# Patient Record
Sex: MALE | Race: WHITE | Employment: OTHER | URBAN - METROPOLITAN AREA
[De-identification: names, ages, dates, MRNs, and addresses within clinical notes are randomized per-mention and may not be internally consistent; named-entity substitution may affect disease eponyms.]

---

## 2017-01-11 RX ORDER — DIAZEPAM 10 MG/1
10 TABLET ORAL EVERY 6 HOURS PRN
Qty: 120 TABLET | Refills: 0 | Status: SHIPPED | OUTPATIENT
Start: 2017-01-11 | End: 2017-02-07 | Stop reason: SDUPTHER

## 2017-01-18 ENCOUNTER — TELEPHONE (OUTPATIENT)
Dept: ENDOCRINOLOGY | Age: 37
End: 2017-01-18

## 2017-01-18 ENCOUNTER — OFFICE VISIT (OUTPATIENT)
Dept: ENDOCRINOLOGY | Age: 37
End: 2017-01-18

## 2017-01-18 VITALS
DIASTOLIC BLOOD PRESSURE: 78 MMHG | SYSTOLIC BLOOD PRESSURE: 138 MMHG | OXYGEN SATURATION: 96 % | BODY MASS INDEX: 32.36 KG/M2 | HEART RATE: 116 BPM | WEIGHT: 232 LBS

## 2017-01-18 DIAGNOSIS — G89.29 CHRONIC MIDLINE LOW BACK PAIN WITHOUT SCIATICA: Primary | ICD-10-CM

## 2017-01-18 DIAGNOSIS — F41.9 ANXIETY: ICD-10-CM

## 2017-01-18 DIAGNOSIS — M54.50 CHRONIC MIDLINE LOW BACK PAIN WITHOUT SCIATICA: Primary | ICD-10-CM

## 2017-01-18 DIAGNOSIS — F32.89 OTHER DEPRESSION: ICD-10-CM

## 2017-01-18 PROCEDURE — G8419 CALC BMI OUT NRM PARAM NOF/U: HCPCS | Performed by: NURSE PRACTITIONER

## 2017-01-18 PROCEDURE — G8484 FLU IMMUNIZE NO ADMIN: HCPCS | Performed by: NURSE PRACTITIONER

## 2017-01-18 PROCEDURE — G8427 DOCREV CUR MEDS BY ELIG CLIN: HCPCS | Performed by: NURSE PRACTITIONER

## 2017-01-18 PROCEDURE — 4004F PT TOBACCO SCREEN RCVD TLK: CPT | Performed by: NURSE PRACTITIONER

## 2017-01-18 PROCEDURE — 99213 OFFICE O/P EST LOW 20 MIN: CPT | Performed by: NURSE PRACTITIONER

## 2017-01-18 RX ORDER — HYDROCODONE BITARTRATE AND ACETAMINOPHEN 10; 325 MG/1; MG/1
2 TABLET ORAL EVERY 6 HOURS PRN
Qty: 240 TABLET | Refills: 0 | Status: SHIPPED | OUTPATIENT
Start: 2017-01-18 | End: 2017-02-15 | Stop reason: SDUPTHER

## 2017-01-30 RX ORDER — ATORVASTATIN CALCIUM 10 MG/1
TABLET, FILM COATED ORAL
Qty: 90 TABLET | Refills: 1 | Status: SHIPPED | OUTPATIENT
Start: 2017-01-30 | End: 2017-02-28

## 2017-02-07 RX ORDER — DIAZEPAM 10 MG/1
10 TABLET ORAL EVERY 6 HOURS PRN
Qty: 120 TABLET | Refills: 0 | Status: SHIPPED | OUTPATIENT
Start: 2017-02-07 | End: 2017-03-10 | Stop reason: SDUPTHER

## 2017-02-15 ENCOUNTER — OFFICE VISIT (OUTPATIENT)
Dept: ENDOCRINOLOGY | Age: 37
End: 2017-02-15

## 2017-02-15 VITALS
BODY MASS INDEX: 34.3 KG/M2 | SYSTOLIC BLOOD PRESSURE: 136 MMHG | HEART RATE: 130 BPM | DIASTOLIC BLOOD PRESSURE: 90 MMHG | HEIGHT: 71 IN | WEIGHT: 245 LBS | OXYGEN SATURATION: 97 %

## 2017-02-15 DIAGNOSIS — G89.29 CHRONIC MIDLINE LOW BACK PAIN WITHOUT SCIATICA: Primary | ICD-10-CM

## 2017-02-15 DIAGNOSIS — F32.89 OTHER DEPRESSION: ICD-10-CM

## 2017-02-15 DIAGNOSIS — M54.50 CHRONIC MIDLINE LOW BACK PAIN WITHOUT SCIATICA: Primary | ICD-10-CM

## 2017-02-15 DIAGNOSIS — F41.9 ANXIETY: ICD-10-CM

## 2017-02-15 PROCEDURE — G8484 FLU IMMUNIZE NO ADMIN: HCPCS | Performed by: NURSE PRACTITIONER

## 2017-02-15 PROCEDURE — G8427 DOCREV CUR MEDS BY ELIG CLIN: HCPCS | Performed by: NURSE PRACTITIONER

## 2017-02-15 PROCEDURE — 4004F PT TOBACCO SCREEN RCVD TLK: CPT | Performed by: NURSE PRACTITIONER

## 2017-02-15 PROCEDURE — 99213 OFFICE O/P EST LOW 20 MIN: CPT | Performed by: NURSE PRACTITIONER

## 2017-02-15 PROCEDURE — G8417 CALC BMI ABV UP PARAM F/U: HCPCS | Performed by: NURSE PRACTITIONER

## 2017-02-15 RX ORDER — DULOXETIN HYDROCHLORIDE 60 MG/1
60 CAPSULE, DELAYED RELEASE ORAL 2 TIMES DAILY
Qty: 180 CAPSULE | Refills: 3 | Status: SHIPPED | OUTPATIENT
Start: 2017-02-15 | End: 2017-11-29 | Stop reason: SDUPTHER

## 2017-02-15 RX ORDER — ICOSAPENT ETHYL 1000 MG/1
2 CAPSULE ORAL 2 TIMES DAILY
Qty: 360 CAPSULE | Refills: 3 | Status: SHIPPED | OUTPATIENT
Start: 2017-02-15 | End: 2017-02-28

## 2017-02-15 RX ORDER — HYDROCODONE BITARTRATE AND ACETAMINOPHEN 10; 325 MG/1; MG/1
2 TABLET ORAL EVERY 6 HOURS PRN
Qty: 240 TABLET | Refills: 0 | Status: SHIPPED | OUTPATIENT
Start: 2017-02-15 | End: 2017-03-14 | Stop reason: SDUPTHER

## 2017-02-15 RX ORDER — ERGOCALCIFEROL (VITAMIN D2) 1250 MCG
100000 CAPSULE ORAL WEEKLY
Qty: 24 CAPSULE | Refills: 3 | Status: SHIPPED | OUTPATIENT
Start: 2017-02-15 | End: 2020-02-20 | Stop reason: ALTCHOICE

## 2017-02-28 ENCOUNTER — TELEPHONE (OUTPATIENT)
Dept: ENDOCRINOLOGY | Age: 37
End: 2017-02-28

## 2017-02-28 RX ORDER — FENOFIBRATE 145 MG/1
145 TABLET, COATED ORAL DAILY
Qty: 30 TABLET | Refills: 5 | Status: SHIPPED | OUTPATIENT
Start: 2017-02-28 | End: 2017-03-01

## 2017-02-28 RX ORDER — ROSUVASTATIN CALCIUM 5 MG/1
5 TABLET, COATED ORAL EVERY EVENING
Qty: 90 TABLET | Refills: 1 | Status: SHIPPED | OUTPATIENT
Start: 2017-02-28 | End: 2017-03-01 | Stop reason: ALTCHOICE

## 2017-03-01 RX ORDER — OMEGA-3-ACID ETHYL ESTERS 1 G/1
2 CAPSULE, LIQUID FILLED ORAL 2 TIMES DAILY
Qty: 360 CAPSULE | Refills: 1 | Status: SHIPPED | OUTPATIENT
Start: 2017-03-01 | End: 2019-01-23

## 2017-03-01 RX ORDER — OMEGA-3-ACID ETHYL ESTERS 1 G/1
2 CAPSULE, LIQUID FILLED ORAL 2 TIMES DAILY
Qty: 120 CAPSULE | Refills: 5 | Status: SHIPPED | OUTPATIENT
Start: 2017-03-01 | End: 2017-03-01 | Stop reason: SDUPTHER

## 2017-03-01 RX ORDER — ATORVASTATIN CALCIUM 10 MG/1
10 TABLET, FILM COATED ORAL NIGHTLY
Qty: 30 TABLET | Refills: 5 | Status: SHIPPED | OUTPATIENT
Start: 2017-03-01 | End: 2017-04-11 | Stop reason: SDUPTHER

## 2017-03-10 RX ORDER — DIAZEPAM 10 MG/1
10 TABLET ORAL EVERY 6 HOURS PRN
Qty: 120 TABLET | Refills: 0 | Status: SHIPPED | OUTPATIENT
Start: 2017-03-10 | End: 2017-04-11 | Stop reason: SDUPTHER

## 2017-03-14 ENCOUNTER — OFFICE VISIT (OUTPATIENT)
Dept: ENDOCRINOLOGY | Age: 37
End: 2017-03-14

## 2017-03-14 VITALS
SYSTOLIC BLOOD PRESSURE: 126 MMHG | BODY MASS INDEX: 35.93 KG/M2 | OXYGEN SATURATION: 98 % | WEIGHT: 257.6 LBS | HEART RATE: 124 BPM | DIASTOLIC BLOOD PRESSURE: 80 MMHG

## 2017-03-14 DIAGNOSIS — F32.89 OTHER DEPRESSION: ICD-10-CM

## 2017-03-14 DIAGNOSIS — M54.50 CHRONIC MIDLINE LOW BACK PAIN WITHOUT SCIATICA: Primary | ICD-10-CM

## 2017-03-14 DIAGNOSIS — F41.9 ANXIETY: ICD-10-CM

## 2017-03-14 DIAGNOSIS — G89.29 CHRONIC MIDLINE LOW BACK PAIN WITHOUT SCIATICA: Primary | ICD-10-CM

## 2017-03-14 PROCEDURE — G8427 DOCREV CUR MEDS BY ELIG CLIN: HCPCS | Performed by: NURSE PRACTITIONER

## 2017-03-14 PROCEDURE — 4004F PT TOBACCO SCREEN RCVD TLK: CPT | Performed by: NURSE PRACTITIONER

## 2017-03-14 PROCEDURE — 99213 OFFICE O/P EST LOW 20 MIN: CPT | Performed by: NURSE PRACTITIONER

## 2017-03-14 PROCEDURE — G8484 FLU IMMUNIZE NO ADMIN: HCPCS | Performed by: NURSE PRACTITIONER

## 2017-03-14 PROCEDURE — G8417 CALC BMI ABV UP PARAM F/U: HCPCS | Performed by: NURSE PRACTITIONER

## 2017-03-14 RX ORDER — HYDROCODONE BITARTRATE AND ACETAMINOPHEN 10; 325 MG/1; MG/1
2 TABLET ORAL EVERY 6 HOURS PRN
Qty: 240 TABLET | Refills: 0 | Status: SHIPPED | OUTPATIENT
Start: 2017-03-14 | End: 2017-04-11 | Stop reason: SDUPTHER

## 2017-04-11 ENCOUNTER — OFFICE VISIT (OUTPATIENT)
Dept: ENDOCRINOLOGY | Age: 37
End: 2017-04-11

## 2017-04-11 VITALS
HEART RATE: 127 BPM | OXYGEN SATURATION: 97 % | DIASTOLIC BLOOD PRESSURE: 80 MMHG | WEIGHT: 253 LBS | BODY MASS INDEX: 35.42 KG/M2 | HEIGHT: 71 IN | SYSTOLIC BLOOD PRESSURE: 120 MMHG

## 2017-04-11 DIAGNOSIS — M54.50 CHRONIC MIDLINE LOW BACK PAIN WITHOUT SCIATICA: Primary | ICD-10-CM

## 2017-04-11 DIAGNOSIS — F41.9 ANXIETY: ICD-10-CM

## 2017-04-11 DIAGNOSIS — F32.89 OTHER DEPRESSION: ICD-10-CM

## 2017-04-11 DIAGNOSIS — G89.29 CHRONIC MIDLINE LOW BACK PAIN WITHOUT SCIATICA: Primary | ICD-10-CM

## 2017-04-11 PROCEDURE — 4004F PT TOBACCO SCREEN RCVD TLK: CPT | Performed by: NURSE PRACTITIONER

## 2017-04-11 PROCEDURE — 99213 OFFICE O/P EST LOW 20 MIN: CPT | Performed by: NURSE PRACTITIONER

## 2017-04-11 PROCEDURE — G8417 CALC BMI ABV UP PARAM F/U: HCPCS | Performed by: NURSE PRACTITIONER

## 2017-04-11 PROCEDURE — G8427 DOCREV CUR MEDS BY ELIG CLIN: HCPCS | Performed by: NURSE PRACTITIONER

## 2017-04-11 RX ORDER — HYDROCODONE BITARTRATE AND ACETAMINOPHEN 10; 325 MG/1; MG/1
2 TABLET ORAL EVERY 6 HOURS PRN
Qty: 240 TABLET | Refills: 0 | Status: SHIPPED | OUTPATIENT
Start: 2017-04-11 | End: 2017-05-09 | Stop reason: SDUPTHER

## 2017-04-11 RX ORDER — DIAZEPAM 10 MG/1
10 TABLET ORAL EVERY 6 HOURS PRN
Qty: 120 TABLET | Refills: 0 | Status: SHIPPED | OUTPATIENT
Start: 2017-04-11 | End: 2017-05-09 | Stop reason: SDUPTHER

## 2017-04-11 RX ORDER — ATORVASTATIN CALCIUM 10 MG/1
10 TABLET, FILM COATED ORAL NIGHTLY
Qty: 30 TABLET | Refills: 5 | Status: SHIPPED | OUTPATIENT
Start: 2017-04-11 | End: 2019-04-15 | Stop reason: SDUPTHER

## 2017-04-13 RX ORDER — CYCLOBENZAPRINE HCL 10 MG
10 TABLET ORAL 3 TIMES DAILY PRN
Qty: 90 TABLET | Refills: 3 | Status: SHIPPED | OUTPATIENT
Start: 2017-04-13 | End: 2017-08-03 | Stop reason: SDUPTHER

## 2017-05-09 ENCOUNTER — OFFICE VISIT (OUTPATIENT)
Dept: ENDOCRINOLOGY | Age: 37
End: 2017-05-09

## 2017-05-09 VITALS
SYSTOLIC BLOOD PRESSURE: 122 MMHG | OXYGEN SATURATION: 96 % | WEIGHT: 260 LBS | HEART RATE: 124 BPM | BODY MASS INDEX: 36.4 KG/M2 | HEIGHT: 71 IN | DIASTOLIC BLOOD PRESSURE: 86 MMHG

## 2017-05-09 DIAGNOSIS — G89.29 OTHER CHRONIC PAIN: ICD-10-CM

## 2017-05-09 DIAGNOSIS — M54.50 CHRONIC MIDLINE LOW BACK PAIN WITHOUT SCIATICA: Primary | ICD-10-CM

## 2017-05-09 DIAGNOSIS — G89.29 CHRONIC MIDLINE LOW BACK PAIN WITHOUT SCIATICA: Primary | ICD-10-CM

## 2017-05-09 PROCEDURE — G8417 CALC BMI ABV UP PARAM F/U: HCPCS | Performed by: NURSE PRACTITIONER

## 2017-05-09 PROCEDURE — 4004F PT TOBACCO SCREEN RCVD TLK: CPT | Performed by: NURSE PRACTITIONER

## 2017-05-09 PROCEDURE — G8427 DOCREV CUR MEDS BY ELIG CLIN: HCPCS | Performed by: NURSE PRACTITIONER

## 2017-05-09 PROCEDURE — 99213 OFFICE O/P EST LOW 20 MIN: CPT | Performed by: NURSE PRACTITIONER

## 2017-05-09 RX ORDER — HYDROCODONE BITARTRATE AND ACETAMINOPHEN 10; 325 MG/1; MG/1
2 TABLET ORAL EVERY 6 HOURS PRN
Qty: 240 TABLET | Refills: 0 | Status: SHIPPED | OUTPATIENT
Start: 2017-05-09 | End: 2017-09-06

## 2017-05-09 RX ORDER — DIAZEPAM 10 MG/1
10 TABLET ORAL EVERY 6 HOURS PRN
Qty: 120 TABLET | Refills: 0 | Status: SHIPPED | OUTPATIENT
Start: 2017-05-09 | End: 2017-06-07 | Stop reason: SDUPTHER

## 2017-05-10 DIAGNOSIS — G89.29 CHRONIC MIDLINE LOW BACK PAIN WITHOUT SCIATICA: Primary | ICD-10-CM

## 2017-05-10 DIAGNOSIS — M54.50 CHRONIC MIDLINE LOW BACK PAIN WITHOUT SCIATICA: Primary | ICD-10-CM

## 2017-05-12 LAB
6-ACETYLMORPHINE: NOT DETECTED
7-AMINOCLONAZEPAM: NOT DETECTED
ALPHA-OH-ALPRAZOLAM: NOT DETECTED
ALPRAZOLAM: NOT DETECTED
AMPHETAMINE: NOT DETECTED
BARBITURATES: NOT DETECTED
BENZOYLECGONINE: NOT DETECTED
BUPRENORPHINE: NOT DETECTED
CARISOPRODOL: NOT DETECTED
CLONAZEPAM: NOT DETECTED
CODEINE: NOT DETECTED
CREATININE URINE: <20 MG/DL (ref 20–400)
DIAZEPAM: NOT DETECTED
EER PAIN MGT DRUG PANEL, HIGH RES/EMIT U: ABNORMAL
ETHYL GLUCURONIDE: NOT DETECTED
FENTANYL: NOT DETECTED
HYDROCODONE: PRESENT
HYDROMORPHONE: PRESENT
LORAZEPAM: NOT DETECTED
MARIJUANA METABOLITE: NOT DETECTED
MDA: NOT DETECTED
MDEA: NOT DETECTED
MDMA URINE: NOT DETECTED
MEPERIDINE: NOT DETECTED
METHADONE: NOT DETECTED
METHAMPHETAMINE: NOT DETECTED
METHYLPHENIDATE: NOT DETECTED
MIDAZOLAM: NOT DETECTED
MORPHINE: NOT DETECTED
NORBUPRENORPHINE, FREE: NOT DETECTED
NORDIAZEPAM: PRESENT
NORFENTANYL: NOT DETECTED
NORHYDROCODONE, URINE: PRESENT
NOROXYCODONE: NOT DETECTED
NOROXYMORPHONE, URINE: NOT DETECTED
OXAZEPAM: PRESENT
OXYCODONE: NOT DETECTED
OXYMORPHONE: NOT DETECTED
PAIN MANAGEMENT DRUG PANEL: ABNORMAL
PCP: NOT DETECTED
PHENTERMINE: NOT DETECTED
PROPOXYPHENE: NOT DETECTED
TAPENTADOL, URINE: NOT DETECTED
TAPENTADOL-O-SULFATE, URINE: NOT DETECTED
TEMAZEPAM: PRESENT
TRAMADOL: NOT DETECTED
ZOLPIDEM: NOT DETECTED

## 2017-06-07 ENCOUNTER — OFFICE VISIT (OUTPATIENT)
Dept: FAMILY MEDICINE CLINIC | Age: 37
End: 2017-06-07

## 2017-06-07 VITALS
BODY MASS INDEX: 36.65 KG/M2 | HEIGHT: 71 IN | SYSTOLIC BLOOD PRESSURE: 128 MMHG | DIASTOLIC BLOOD PRESSURE: 84 MMHG | OXYGEN SATURATION: 97 % | HEART RATE: 111 BPM | WEIGHT: 261.8 LBS

## 2017-06-07 DIAGNOSIS — F41.9 ANXIETY: Primary | ICD-10-CM

## 2017-06-07 DIAGNOSIS — E23.0 HYPOGONADOTROPIC HYPOGONADISM (HCC): ICD-10-CM

## 2017-06-07 PROCEDURE — 99213 OFFICE O/P EST LOW 20 MIN: CPT | Performed by: FAMILY MEDICINE

## 2017-06-07 PROCEDURE — G8427 DOCREV CUR MEDS BY ELIG CLIN: HCPCS | Performed by: FAMILY MEDICINE

## 2017-06-07 PROCEDURE — 4004F PT TOBACCO SCREEN RCVD TLK: CPT | Performed by: FAMILY MEDICINE

## 2017-06-07 PROCEDURE — G8417 CALC BMI ABV UP PARAM F/U: HCPCS | Performed by: FAMILY MEDICINE

## 2017-06-07 RX ORDER — DIAZEPAM 10 MG/1
10 TABLET ORAL EVERY 6 HOURS PRN
Qty: 120 TABLET | Refills: 2 | Status: SHIPPED | OUTPATIENT
Start: 2017-06-07 | End: 2017-09-06 | Stop reason: SDUPTHER

## 2017-06-30 RX ORDER — TESTOSTERONE CYPIONATE 200 MG/ML
120 INJECTION INTRAMUSCULAR WEEKLY
Qty: 10 ML | Refills: 1 | Status: SHIPPED | OUTPATIENT
Start: 2017-06-30 | End: 2019-01-23

## 2017-08-03 RX ORDER — CYCLOBENZAPRINE HCL 10 MG
10 TABLET ORAL 3 TIMES DAILY PRN
Qty: 90 TABLET | Refills: 3 | Status: SHIPPED | OUTPATIENT
Start: 2017-08-03 | End: 2017-11-15 | Stop reason: SDUPTHER

## 2017-08-15 ENCOUNTER — TELEPHONE (OUTPATIENT)
Dept: FAMILY MEDICINE CLINIC | Age: 37
End: 2017-08-15

## 2017-08-15 DIAGNOSIS — Z71.89 PAIN MANAGEMENT COUNSELING, ENCOUNTER FOR: Primary | ICD-10-CM

## 2017-08-17 RX ORDER — CYANOCOBALAMIN 1000 UG/ML
INJECTION INTRAMUSCULAR; SUBCUTANEOUS
Qty: 1 ML | Refills: 10 | Status: SHIPPED | OUTPATIENT
Start: 2017-08-17 | End: 2017-08-17 | Stop reason: SDUPTHER

## 2017-08-17 RX ORDER — CYANOCOBALAMIN 1000 UG/ML
INJECTION INTRAMUSCULAR; SUBCUTANEOUS
Qty: 1 ML | Refills: 10 | Status: SHIPPED | OUTPATIENT
Start: 2017-08-17 | End: 2019-01-23

## 2017-09-01 DIAGNOSIS — E23.0 HYPOGONADOTROPIC HYPOGONADISM (HCC): ICD-10-CM

## 2017-09-01 DIAGNOSIS — E23.0 HYPOGONADOTROPIC HYPOGONADISM (HCC): Primary | ICD-10-CM

## 2017-09-06 ENCOUNTER — OFFICE VISIT (OUTPATIENT)
Dept: FAMILY MEDICINE CLINIC | Age: 37
End: 2017-09-06

## 2017-09-06 ENCOUNTER — TELEPHONE (OUTPATIENT)
Dept: FAMILY MEDICINE CLINIC | Age: 37
End: 2017-09-06

## 2017-09-06 VITALS
WEIGHT: 255 LBS | HEART RATE: 106 BPM | SYSTOLIC BLOOD PRESSURE: 108 MMHG | BODY MASS INDEX: 35.57 KG/M2 | DIASTOLIC BLOOD PRESSURE: 68 MMHG | OXYGEN SATURATION: 94 %

## 2017-09-06 DIAGNOSIS — F41.9 ANXIETY: Primary | ICD-10-CM

## 2017-09-06 DIAGNOSIS — Z71.89 ENCOUNTER FOR PAIN MANAGEMENT COUNSELING: Primary | ICD-10-CM

## 2017-09-06 DIAGNOSIS — G89.29 OTHER CHRONIC PAIN: ICD-10-CM

## 2017-09-06 PROCEDURE — 4004F PT TOBACCO SCREEN RCVD TLK: CPT | Performed by: NURSE PRACTITIONER

## 2017-09-06 PROCEDURE — G8417 CALC BMI ABV UP PARAM F/U: HCPCS | Performed by: NURSE PRACTITIONER

## 2017-09-06 PROCEDURE — 99214 OFFICE O/P EST MOD 30 MIN: CPT | Performed by: NURSE PRACTITIONER

## 2017-09-06 PROCEDURE — G8427 DOCREV CUR MEDS BY ELIG CLIN: HCPCS | Performed by: NURSE PRACTITIONER

## 2017-09-06 RX ORDER — OXYCODONE AND ACETAMINOPHEN 10; 325 MG/1; MG/1
1 TABLET ORAL EVERY 4 HOURS PRN
COMMUNITY
End: 2018-10-25 | Stop reason: ALTCHOICE

## 2017-09-06 RX ORDER — DIAZEPAM 10 MG/1
10 TABLET ORAL EVERY 6 HOURS PRN
Qty: 120 TABLET | Refills: 2 | Status: SHIPPED | OUTPATIENT
Start: 2017-09-06 | End: 2017-11-29 | Stop reason: SDUPTHER

## 2017-09-06 ASSESSMENT — ENCOUNTER SYMPTOMS
SINUS PRESSURE: 0
EYE REDNESS: 0
SHORTNESS OF BREATH: 0
CONSTIPATION: 0
NAUSEA: 0
WHEEZING: 0
DIARRHEA: 0
APNEA: 0
COUGH: 0
ABDOMINAL DISTENTION: 0
RHINORRHEA: 0
VOMITING: 0
CHEST TIGHTNESS: 0
EYE PAIN: 0
ABDOMINAL PAIN: 0
BLOOD IN STOOL: 0

## 2017-09-08 LAB
SEX HORMONE BINDING GLOBULIN: 42 NMOL/L (ref 11–80)
TESTOSTERONE FREE-NONMALE: 79.7 PG/ML (ref 47–244)
TESTOSTERONE TOTAL: 449 NG/DL (ref 220–1000)

## 2017-11-15 ENCOUNTER — PATIENT MESSAGE (OUTPATIENT)
Dept: FAMILY MEDICINE CLINIC | Age: 37
End: 2017-11-15

## 2017-11-15 RX ORDER — CYCLOBENZAPRINE HCL 10 MG
10 TABLET ORAL 3 TIMES DAILY PRN
Qty: 90 TABLET | Refills: 3 | Status: SHIPPED | OUTPATIENT
Start: 2017-11-15 | End: 2018-03-12 | Stop reason: SDUPTHER

## 2017-11-15 NOTE — TELEPHONE ENCOUNTER
From: Jerad Silence  To: Juan Rivera MD  Sent: 11/15/2017 12:08 AM EST  Subject: Prescription Eura Cordial Dr Prabhu Perez I need my Flexeril muscle relaxer refill. I went to my medication list on here and didn't see it on there to hit the button to notify you of my refill. If you could send it to [facility] I would greatly appreciate it.  Thanks

## 2017-11-29 ENCOUNTER — OFFICE VISIT (OUTPATIENT)
Dept: FAMILY MEDICINE CLINIC | Age: 37
End: 2017-11-29

## 2017-11-29 VITALS
OXYGEN SATURATION: 98 % | BODY MASS INDEX: 35.14 KG/M2 | HEIGHT: 71 IN | DIASTOLIC BLOOD PRESSURE: 88 MMHG | HEART RATE: 120 BPM | SYSTOLIC BLOOD PRESSURE: 138 MMHG | WEIGHT: 251 LBS

## 2017-11-29 DIAGNOSIS — Z79.899 MEDICATION MANAGEMENT: ICD-10-CM

## 2017-11-29 DIAGNOSIS — G89.29 OTHER CHRONIC PAIN: ICD-10-CM

## 2017-11-29 DIAGNOSIS — F41.9 ANXIETY: Primary | ICD-10-CM

## 2017-11-29 PROCEDURE — 4004F PT TOBACCO SCREEN RCVD TLK: CPT | Performed by: FAMILY MEDICINE

## 2017-11-29 PROCEDURE — G8484 FLU IMMUNIZE NO ADMIN: HCPCS | Performed by: FAMILY MEDICINE

## 2017-11-29 PROCEDURE — G8417 CALC BMI ABV UP PARAM F/U: HCPCS | Performed by: FAMILY MEDICINE

## 2017-11-29 PROCEDURE — 99213 OFFICE O/P EST LOW 20 MIN: CPT | Performed by: FAMILY MEDICINE

## 2017-11-29 PROCEDURE — G8427 DOCREV CUR MEDS BY ELIG CLIN: HCPCS | Performed by: FAMILY MEDICINE

## 2017-11-29 RX ORDER — DULOXETIN HYDROCHLORIDE 60 MG/1
60 CAPSULE, DELAYED RELEASE ORAL 2 TIMES DAILY
Qty: 90 CAPSULE | Refills: 1 | Status: SHIPPED | OUTPATIENT
Start: 2017-11-29 | End: 2018-02-13 | Stop reason: SDUPTHER

## 2017-11-29 RX ORDER — DIAZEPAM 10 MG/1
10 TABLET ORAL EVERY 6 HOURS PRN
Qty: 120 TABLET | Refills: 2 | Status: SHIPPED | OUTPATIENT
Start: 2017-11-29 | End: 2018-02-13 | Stop reason: SDUPTHER

## 2017-11-30 NOTE — PROGRESS NOTES
Subjective:      Patient ID: Natividad Fowler is a 40 y.o. male. HPI   Pt is a of 40 y.o. male comes in today with   Chief Complaint   Patient presents with    Medication Check     Patient is here for medication check and refill. Here for med refill. Anxiety worse. Under a lot of stress. Taking valium qid  No side effects  Following with pain management. No Known Allergies  Current Outpatient Prescriptions on File Prior to Visit   Medication Sig Dispense Refill    cyclobenzaprine (FLEXERIL) 10 MG tablet Take 1 tablet by mouth 3 times daily as needed for Muscle spasms 90 tablet 3    oxyCODONE-acetaminophen (PERCOCET)  MG per tablet Take 1 tablet by mouth every 4 hours as needed for Pain .  cyanocobalamin 1000 MCG/ML injection INJECT 1 ML INTO THE MUSCLE EVERY 14 DAYS 1 mL 10    testosterone cypionate (DEPOTESTOTERONE CYPIONATE) 200 MG/ML injection Inject 0.6 mLs into the muscle once a week 10 mL 1    atorvastatin (LIPITOR) 10 MG tablet Take 1 tablet by mouth nightly 30 tablet 5    omega-3 acid ethyl esters (LOVAZA) 1 G capsule Take 2 capsules by mouth 2 times daily 360 capsule 1    ergocalciferol (DRISDOL) 04440 UNITS capsule Take 2 capsules by mouth once a week 24 capsule 3    melatonin 3 MG TABS tablet Take 6 mg by mouth daily      Folinic Acid-Vit B6-Vit B12 (FOLINIC-PLUS) 4-50-2 MG TABS Take 1 tablet by mouth daily 30 tablet 11    hyoscyamine (LEVSIN/SL) 0.125 MG SL tablet Place 1 tablet under the tongue every 4 hours as needed for Cramping 30 tablet 0    Misc. Devices [facility]) MISC 1 each by Does not apply route daily as needed. 1 each 0    NEEDLE, DISP, 18 G 18G X 1-1/2\" MISC 1 each by Does not apply route once a week. To draw up Testosterone 4 each 11    Syringe/Needle, Disp, (SYRINGE 3CC/25GX1\") 25G X 1\" 3 ML MISC 1 each by Does not apply route every 30 days. 4 each 3    Handicap Placard MISC by Does not apply route. Expires 1/10/2019.   Dx Chronic back pain .5; 338.29 1 each 0    esomeprazole (NEXIUM) 40 MG capsule Take 1 capsule by mouth 2 times daily 60 capsule 1     No current facility-administered medications on file prior to visit. Review of Systems   Constitutional: Negative. Objective:   Physical Exam   Constitutional: He is oriented to person, place, and time. He appears well-developed and well-nourished. HENT:   Head: Normocephalic and atraumatic. Eyes: Pupils are equal, round, and reactive to light. No scleral icterus. Neurological: He is alert and oriented to person, place, and time. No cranial nerve deficit. Skin: Skin is warm and dry. Psychiatric: He has a normal mood and affect. His behavior is normal. Judgment and thought content normal.       Assessment:      1. Anxiety  diazepam (VALIUM) 10 MG tablet   2. Medication management  Drug Panel-PM-HI Res-UR Interp-A   3. Other chronic pain            Plan:      Savannah Troncoso was seen today for medication check. Diagnoses and all orders for this visit:    Anxiety  -     diazepam (VALIUM) 10 MG tablet; Take 1 tablet by mouth every 6 hours as needed for Anxiety  Patient takes 4 times daily. Stable on valium  Controlled Substances Monitoring: Attestation: The Prescription Monitoring Report for this patient was reviewed today. Lucero Phelps MD)  Documentation: Possible medication side effects, risk of tolerance and/or dependence, and alternative treatments discussed. , Random urine drug screen sent today., No signs of potential drug abuse or diversion identified. Lucero Phelps MD)   Medication management  -     Drug Panel-PM-HI Res-UR Interp-A    Other chronic pain  Following with pain  Suggested restart cymbalta  Other orders  -     DULoxetine (CYMBALTA) 60 MG extended release capsule;  Take 1 capsule by mouth 2 times daily

## 2017-12-03 LAB
6-ACETYLMORPHINE: NOT DETECTED
7-AMINOCLONAZEPAM: NOT DETECTED
ALPHA-OH-ALPRAZOLAM: NOT DETECTED
ALPRAZOLAM: NOT DETECTED
AMPHETAMINE: NOT DETECTED
BARBITURATES: NOT DETECTED
BENZOYLECGONINE: NOT DETECTED
BUPRENORPHINE: NOT DETECTED
CARISOPRODOL: NOT DETECTED
CLONAZEPAM: NOT DETECTED
CODEINE: NOT DETECTED
CREATININE URINE: 44.8 MG/DL (ref 20–400)
DIAZEPAM: NOT DETECTED
DRUGS EXPECTED: NORMAL
EER PAIN MGT DRUG PANEL, HIGH RES/EMIT U: NORMAL
ETHYL GLUCURONIDE: NOT DETECTED
FENTANYL: NOT DETECTED
HYDROCODONE: NOT DETECTED
HYDROMORPHONE: NOT DETECTED
LORAZEPAM: NOT DETECTED
MARIJUANA METABOLITE: NOT DETECTED
MDA: NOT DETECTED
MDEA: NOT DETECTED
MDMA URINE: NOT DETECTED
MEPERIDINE: NOT DETECTED
METHADONE: NOT DETECTED
METHAMPHETAMINE: NOT DETECTED
METHYLPHENIDATE: NOT DETECTED
MIDAZOLAM: NOT DETECTED
MORPHINE: NOT DETECTED
NORBUPRENORPHINE, FREE: NOT DETECTED
NORDIAZEPAM: PRESENT
NORFENTANYL: NOT DETECTED
NORHYDROCODONE, URINE: NOT DETECTED
NOROXYCODONE: PRESENT
NOROXYMORPHONE, URINE: PRESENT
OXAZEPAM: PRESENT
OXYCODONE: PRESENT
OXYMORPHONE: PRESENT
PAIN MANAGEMENT DRUG PANEL: NORMAL
PAIN MANAGEMENT DRUG PANEL: NORMAL
PCP: NOT DETECTED
PHENTERMINE: NOT DETECTED
PROPOXYPHENE: NOT DETECTED
TAPENTADOL, URINE: NOT DETECTED
TAPENTADOL-O-SULFATE, URINE: NOT DETECTED
TEMAZEPAM: PRESENT
TRAMADOL: NOT DETECTED
ZOLPIDEM: NOT DETECTED

## 2018-02-13 ENCOUNTER — OFFICE VISIT (OUTPATIENT)
Dept: FAMILY MEDICINE CLINIC | Age: 38
End: 2018-02-13

## 2018-02-13 VITALS
HEART RATE: 118 BPM | OXYGEN SATURATION: 98 % | HEIGHT: 72 IN | WEIGHT: 254 LBS | BODY MASS INDEX: 34.4 KG/M2 | DIASTOLIC BLOOD PRESSURE: 78 MMHG | SYSTOLIC BLOOD PRESSURE: 132 MMHG

## 2018-02-13 DIAGNOSIS — F41.9 ANXIETY: ICD-10-CM

## 2018-02-13 PROCEDURE — G8484 FLU IMMUNIZE NO ADMIN: HCPCS | Performed by: FAMILY MEDICINE

## 2018-02-13 PROCEDURE — 99213 OFFICE O/P EST LOW 20 MIN: CPT | Performed by: FAMILY MEDICINE

## 2018-02-13 PROCEDURE — G8427 DOCREV CUR MEDS BY ELIG CLIN: HCPCS | Performed by: FAMILY MEDICINE

## 2018-02-13 PROCEDURE — G8417 CALC BMI ABV UP PARAM F/U: HCPCS | Performed by: FAMILY MEDICINE

## 2018-02-13 PROCEDURE — 4004F PT TOBACCO SCREEN RCVD TLK: CPT | Performed by: FAMILY MEDICINE

## 2018-02-13 RX ORDER — DULOXETIN HYDROCHLORIDE 60 MG/1
60 CAPSULE, DELAYED RELEASE ORAL 2 TIMES DAILY
Qty: 180 CAPSULE | Refills: 1 | Status: SHIPPED | OUTPATIENT
Start: 2018-02-13 | End: 2019-02-14 | Stop reason: SDUPTHER

## 2018-02-13 RX ORDER — DIAZEPAM 10 MG/1
10 TABLET ORAL EVERY 6 HOURS PRN
Qty: 120 TABLET | Refills: 2 | Status: SHIPPED | OUTPATIENT
Start: 2018-02-13 | End: 2018-05-11 | Stop reason: SDUPTHER

## 2018-02-13 NOTE — PROGRESS NOTES
Subjective:      Patient ID: Pedro Horn is a 40 y.o. male. HPI   Pt is a of 40 y.o. male comes in today with   Chief Complaint   Patient presents with    Medication Check     Patient is here for 3 month medication check and refill. Here for 3 month med check on valium. Taking qid. No side effects  Helps with anxiety, which has been worse recently. Stable on current dose for years  No Known Allergies    Review of Systems  Mood stable. No side effects   Objective:   Physical Exam    Assessment:      1. Anxiety  diazepam (VALIUM) 10 MG tablet          Plan:      Severe anxiety. .  Stable on valium and cymbalta  Pt aware of interaction with opiods.  Seeing pain management for that

## 2018-02-18 ENCOUNTER — TELEPHONE (OUTPATIENT)
Dept: FAMILY MEDICINE CLINIC | Age: 38
End: 2018-02-18

## 2018-03-12 ENCOUNTER — PATIENT MESSAGE (OUTPATIENT)
Dept: FAMILY MEDICINE CLINIC | Age: 38
End: 2018-03-12

## 2018-03-12 RX ORDER — CYCLOBENZAPRINE HCL 10 MG
10 TABLET ORAL 3 TIMES DAILY PRN
Qty: 90 TABLET | Refills: 3 | Status: SHIPPED | OUTPATIENT
Start: 2018-03-12 | End: 2018-05-11 | Stop reason: SDUPTHER

## 2018-05-11 ENCOUNTER — OFFICE VISIT (OUTPATIENT)
Dept: FAMILY MEDICINE CLINIC | Age: 38
End: 2018-05-11

## 2018-05-11 VITALS
DIASTOLIC BLOOD PRESSURE: 74 MMHG | BODY MASS INDEX: 36.57 KG/M2 | SYSTOLIC BLOOD PRESSURE: 124 MMHG | HEIGHT: 72 IN | WEIGHT: 270 LBS | HEART RATE: 118 BPM | OXYGEN SATURATION: 95 %

## 2018-05-11 DIAGNOSIS — F41.9 ANXIETY: ICD-10-CM

## 2018-05-11 DIAGNOSIS — F33.1 MODERATE EPISODE OF RECURRENT MAJOR DEPRESSIVE DISORDER (HCC): Primary | ICD-10-CM

## 2018-05-11 PROCEDURE — 4004F PT TOBACCO SCREEN RCVD TLK: CPT | Performed by: FAMILY MEDICINE

## 2018-05-11 PROCEDURE — G8427 DOCREV CUR MEDS BY ELIG CLIN: HCPCS | Performed by: FAMILY MEDICINE

## 2018-05-11 PROCEDURE — 99214 OFFICE O/P EST MOD 30 MIN: CPT | Performed by: FAMILY MEDICINE

## 2018-05-11 PROCEDURE — G8417 CALC BMI ABV UP PARAM F/U: HCPCS | Performed by: FAMILY MEDICINE

## 2018-05-11 RX ORDER — QUETIAPINE FUMARATE 100 MG/1
100 TABLET, FILM COATED ORAL NIGHTLY
Qty: 30 TABLET | Refills: 1 | Status: SHIPPED | OUTPATIENT
Start: 2018-05-11 | End: 2019-01-23

## 2018-05-11 RX ORDER — DIAZEPAM 10 MG/1
10 TABLET ORAL EVERY 6 HOURS PRN
Qty: 120 TABLET | Refills: 2 | Status: SHIPPED | OUTPATIENT
Start: 2018-05-11 | End: 2018-08-01 | Stop reason: SDUPTHER

## 2018-05-11 RX ORDER — CYCLOBENZAPRINE HCL 10 MG
10 TABLET ORAL 3 TIMES DAILY PRN
Qty: 90 TABLET | Refills: 3 | Status: SHIPPED | OUTPATIENT
Start: 2018-05-11 | End: 2018-08-01 | Stop reason: SDUPTHER

## 2018-08-01 ENCOUNTER — OFFICE VISIT (OUTPATIENT)
Dept: FAMILY MEDICINE CLINIC | Age: 38
End: 2018-08-01

## 2018-08-01 VITALS
SYSTOLIC BLOOD PRESSURE: 126 MMHG | HEIGHT: 72 IN | HEART RATE: 101 BPM | DIASTOLIC BLOOD PRESSURE: 88 MMHG | WEIGHT: 269.8 LBS | BODY MASS INDEX: 36.54 KG/M2 | OXYGEN SATURATION: 97 %

## 2018-08-01 DIAGNOSIS — E55.9 VITAMIN D DEFICIENCY: ICD-10-CM

## 2018-08-01 DIAGNOSIS — R53.83 FATIGUE, UNSPECIFIED TYPE: ICD-10-CM

## 2018-08-01 DIAGNOSIS — E23.0 HYPOGONADOTROPIC HYPOGONADISM (HCC): ICD-10-CM

## 2018-08-01 DIAGNOSIS — L08.9 INFECTED SEBACEOUS CYST: ICD-10-CM

## 2018-08-01 DIAGNOSIS — F41.9 ANXIETY: Primary | ICD-10-CM

## 2018-08-01 DIAGNOSIS — L72.3 INFECTED SEBACEOUS CYST: ICD-10-CM

## 2018-08-01 PROCEDURE — 4004F PT TOBACCO SCREEN RCVD TLK: CPT | Performed by: FAMILY MEDICINE

## 2018-08-01 PROCEDURE — G8417 CALC BMI ABV UP PARAM F/U: HCPCS | Performed by: FAMILY MEDICINE

## 2018-08-01 PROCEDURE — 99214 OFFICE O/P EST MOD 30 MIN: CPT | Performed by: FAMILY MEDICINE

## 2018-08-01 PROCEDURE — G8427 DOCREV CUR MEDS BY ELIG CLIN: HCPCS | Performed by: FAMILY MEDICINE

## 2018-08-01 RX ORDER — SULFAMETHOXAZOLE AND TRIMETHOPRIM 800; 160 MG/1; MG/1
1 TABLET ORAL 2 TIMES DAILY
Qty: 20 TABLET | Refills: 0 | Status: SHIPPED | OUTPATIENT
Start: 2018-08-01 | End: 2018-08-11

## 2018-08-01 RX ORDER — DIAZEPAM 10 MG/1
10 TABLET ORAL EVERY 6 HOURS PRN
Qty: 120 TABLET | Refills: 2 | Status: SHIPPED | OUTPATIENT
Start: 2018-08-01 | End: 2018-10-25 | Stop reason: SDUPTHER

## 2018-08-01 RX ORDER — CYCLOBENZAPRINE HCL 10 MG
10 TABLET ORAL 3 TIMES DAILY PRN
Qty: 90 TABLET | Refills: 2 | Status: SHIPPED | OUTPATIENT
Start: 2018-08-01 | End: 2018-10-25 | Stop reason: SDUPTHER

## 2018-08-01 ASSESSMENT — ENCOUNTER SYMPTOMS: RESPIRATORY NEGATIVE: 1

## 2018-08-01 NOTE — PROGRESS NOTES
today for medication check. Diagnoses and all orders for this visit:    Anxiety  -     diazepam (VALIUM) 10 MG tablet; Take 1 tablet by mouth every 6 hours as needed for Anxiety for up to 90 days. Patient takes 4 times daily. Stable on valium  Controlled Substances Monitoring: Attestation: The Prescription Monitoring Report for this patient was reviewed today. Vinayak Sanchez MD)  Documentation: Possible medication side effects, risk of tolerance/dependence & alternative treatments discussed., No signs of potential drug abuse or diversion identified. Vinayak Sanchez MD)   Hypogonadotropic hypogonadism [facility])  -     Testosterone, free, total; Future  Rechecking off testosterone o  Vitamin D deficiency  -     Vitamin D 25 Hydroxy; Future  Rechecking to see if supplementation needs to be restarted  Fatigue, unspecified type  -     Comprehensive Metabolic Panel; Future  -     CBC; Future  -     Vitamin B12; Future  bloodwork ordered to workup   Infected sebaceous cyst  Covering with antibiotics   Sitz baths. Return for I+D if not improving  Other orders  -     sulfamethoxazole-trimethoprim (BACTRIM DS) 800-160 MG per tablet; Take 1 tablet by mouth 2 times daily for 10 days  -     cyclobenzaprine (FLEXERIL) 10 MG tablet;  Take 1 tablet by mouth 3 times daily as needed for Muscle spasms

## 2018-08-29 NOTE — PROGRESS NOTES
[facility] / [facility] [place] ([facility]) [address] [facility], [address]    Acknowledgment of Informed Consent for Surgical or Medical Procedure and Sedation  I agree to allow doctor(s) Jackie Hatfield and his/her associates or assistants, including residents and/or other qualified medical practitioner to perform the following medical treatment or procedure and to administer or direct the administration of sedation as necessary:  Procedure(s): LEFT ABDOMINAL INTRATHECAL PAIN PUMP AND CATHETER  My doctor has explained the following regarding the proposed procedure:   the explanation of the procedure   the benefits of the procedure   the potential problems that might occur during recuperation   the risks and side effects of the procedure which could include but are not limited to severe blood loss, infection, stroke or death   the benefits, risks and side effect of alternative procedures including the consequences of declining this procedure or any alternative procedures   the likelihood of achieving satisfactory results. I acknowledge no guarantee or assurance has been made to me regarding the results. I understand that during the course of this treatment/procedure, unforeseen conditions can occur which require an additional or different procedure. I agree to allow my physician or assistants to perform such extension of the original procedure as they may find necessary. I understand that sedation will often result in temporary impairment of memory and fine motor skills and that sedation can occasionally progress to a state of deep sedation or general anesthesia. I understand the risks of anesthesia for surgery include, but are not limited to, sore throat, hoarseness, injury to face, mouth, or teeth; nausea; headache; injury to blood vessels or nerves; death, brain damage, or paralysis.     I understand that if I have a Limitation of Treatment order in effect during my hospitalization, the

## 2018-09-05 ENCOUNTER — OFFICE VISIT (OUTPATIENT)
Dept: FAMILY MEDICINE CLINIC | Age: 38
End: 2018-09-05

## 2018-09-05 VITALS
DIASTOLIC BLOOD PRESSURE: 80 MMHG | HEIGHT: 72 IN | WEIGHT: 275.4 LBS | HEART RATE: 106 BPM | SYSTOLIC BLOOD PRESSURE: 130 MMHG | OXYGEN SATURATION: 97 % | TEMPERATURE: 97.9 F | BODY MASS INDEX: 37.3 KG/M2

## 2018-09-05 DIAGNOSIS — E23.0 HYPOGONADOTROPIC HYPOGONADISM (HCC): ICD-10-CM

## 2018-09-05 DIAGNOSIS — Z01.818 PREOP EXAMINATION: ICD-10-CM

## 2018-09-05 DIAGNOSIS — E78.2 MIXED HYPERLIPIDEMIA: ICD-10-CM

## 2018-09-05 DIAGNOSIS — Z01.818 PREOP EXAMINATION: Primary | ICD-10-CM

## 2018-09-05 DIAGNOSIS — F17.200 TOBACCO DEPENDENCE: ICD-10-CM

## 2018-09-05 LAB
ANION GAP SERPL CALCULATED.3IONS-SCNC: 14 MMOL/L (ref 3–16)
BASOPHILS ABSOLUTE: 0.1 K/UL (ref 0–0.2)
BASOPHILS RELATIVE PERCENT: 0.8 %
BUN BLDV-MCNC: 5 MG/DL (ref 7–20)
CALCIUM SERPL-MCNC: 9.2 MG/DL (ref 8.3–10.6)
CHLORIDE BLD-SCNC: 104 MMOL/L (ref 99–110)
CO2: 22 MMOL/L (ref 21–32)
CREAT SERPL-MCNC: 0.9 MG/DL (ref 0.9–1.3)
EOSINOPHILS ABSOLUTE: 0.3 K/UL (ref 0–0.6)
EOSINOPHILS RELATIVE PERCENT: 3.2 %
GFR AFRICAN AMERICAN: >60
GFR NON-AFRICAN AMERICAN: >60
GLUCOSE BLD-MCNC: 112 MG/DL (ref 70–99)
HCT VFR BLD CALC: 46.1 % (ref 40.5–52.5)
HEMOGLOBIN: 15.2 G/DL (ref 13.5–17.5)
LYMPHOCYTES ABSOLUTE: 2.9 K/UL (ref 1–5.1)
LYMPHOCYTES RELATIVE PERCENT: 29.2 %
MCH RBC QN AUTO: 29.7 PG (ref 26–34)
MCHC RBC AUTO-ENTMCNC: 32.9 G/DL (ref 31–36)
MCV RBC AUTO: 90.4 FL (ref 80–100)
MONOCYTES ABSOLUTE: 0.5 K/UL (ref 0–1.3)
MONOCYTES RELATIVE PERCENT: 4.8 %
NEUTROPHILS ABSOLUTE: 6.1 K/UL (ref 1.7–7.7)
NEUTROPHILS RELATIVE PERCENT: 62 %
PDW BLD-RTO: 14.9 % (ref 12.4–15.4)
PLATELET # BLD: 245 K/UL (ref 135–450)
PMV BLD AUTO: 8.6 FL (ref 5–10.5)
POTASSIUM SERPL-SCNC: 4.3 MMOL/L (ref 3.5–5.1)
RBC # BLD: 5.1 M/UL (ref 4.2–5.9)
SODIUM BLD-SCNC: 140 MMOL/L (ref 136–145)
WBC # BLD: 9.9 K/UL (ref 4–11)

## 2018-09-05 PROCEDURE — G8427 DOCREV CUR MEDS BY ELIG CLIN: HCPCS | Performed by: FAMILY MEDICINE

## 2018-09-05 PROCEDURE — 4004F PT TOBACCO SCREEN RCVD TLK: CPT | Performed by: FAMILY MEDICINE

## 2018-09-05 PROCEDURE — 99214 OFFICE O/P EST MOD 30 MIN: CPT | Performed by: FAMILY MEDICINE

## 2018-09-05 PROCEDURE — G8417 CALC BMI ABV UP PARAM F/U: HCPCS | Performed by: FAMILY MEDICINE

## 2018-09-05 ASSESSMENT — ENCOUNTER SYMPTOMS: RESPIRATORY NEGATIVE: 1

## 2018-09-05 NOTE — PROGRESS NOTES
worked in construction, on disability since 2007       History   Drug Use No       Family History   Problem Relation Age of Onset    Thyroid Disease Mother     High Cholesterol Mother     Diabetes Father         thinks he is, never diagnosed    Schizophrenia Father         paranoid        Review of Systems   Constitutional: Negative. Respiratory: Negative. Cardiovascular: Negative. Hematological: Does not bruise/bleed easily. Objective:   Physical Exam   Constitutional: He is oriented to person, place, and time. He appears well-developed and well-nourished. No distress. HENT:   Nose: Mucosal edema present. Right sinus exhibits no maxillary sinus tenderness and no frontal sinus tenderness. Left sinus exhibits no maxillary sinus tenderness and no frontal sinus tenderness. Mouth/Throat: Oropharynx is clear and moist. No oropharyngeal exudate. Eyes: Conjunctivae are normal. No scleral icterus. Neck: Neck supple. No thyromegaly present. Cardiovascular: Normal rate, regular rhythm and normal heart sounds. No murmur heard. Pulmonary/Chest: Effort normal and breath sounds normal. He has no wheezes. He has no rales. Lymphadenopathy:        Head (right side): No submandibular and no preauricular adenopathy present. Head (left side): No submandibular and no preauricular adenopathy present. He has no cervical adenopathy. Neurological: He is alert and oriented to person, place, and time. No cranial nerve deficit. Skin: Skin is warm and dry. He is not diaphoretic. No cyanosis. Nails show no clubbing. Psychiatric: He has a normal mood and affect. His behavior is normal. Judgment and thought content normal.       Assessment:       Diagnosis Orders   1. Preop examination     2. Mixed hyperlipidemia     3. Hypogonadotropic hypogonadism ([address]     4.  Tobacco dependence            Plan:      Princess Wang was seen today for pre-op exam.    Diagnoses and all orders for this visit:    Brianna Rivera

## 2018-09-10 ENCOUNTER — ANESTHESIA EVENT (OUTPATIENT)
Dept: OPERATING ROOM | Age: 38
End: 2018-09-10
Payer: MEDICARE

## 2018-09-11 ENCOUNTER — APPOINTMENT (OUTPATIENT)
Dept: GENERAL RADIOLOGY | Age: 38
End: 2018-09-11
Attending: NEUROLOGICAL SURGERY
Payer: MEDICARE

## 2018-09-11 ENCOUNTER — HOSPITAL ENCOUNTER (OUTPATIENT)
Age: 38
Setting detail: OUTPATIENT SURGERY
Discharge: HOME | End: 2018-09-11
Attending: NEUROLOGICAL SURGERY | Admitting: NEUROLOGICAL SURGERY
Payer: MEDICARE

## 2018-09-11 ENCOUNTER — ANESTHESIA (OUTPATIENT)
Dept: OPERATING ROOM | Age: 38
End: 2018-09-11
Payer: MEDICARE

## 2018-09-11 VITALS
HEART RATE: 97 BPM | WEIGHT: 275 LBS | OXYGEN SATURATION: 91 % | SYSTOLIC BLOOD PRESSURE: 121 MMHG | BODY MASS INDEX: 37.25 KG/M2 | DIASTOLIC BLOOD PRESSURE: 78 MMHG | HEIGHT: 72 IN | TEMPERATURE: 97.7 F | RESPIRATION RATE: 16 BRPM

## 2018-09-11 VITALS — SYSTOLIC BLOOD PRESSURE: 101 MMHG | OXYGEN SATURATION: 91 % | DIASTOLIC BLOOD PRESSURE: 54 MMHG | TEMPERATURE: 98.1 F

## 2018-09-11 DIAGNOSIS — G89.29 CHRONIC MIDLINE LOW BACK PAIN WITHOUT SCIATICA: Primary | ICD-10-CM

## 2018-09-11 DIAGNOSIS — M54.50 CHRONIC MIDLINE LOW BACK PAIN WITHOUT SCIATICA: Primary | ICD-10-CM

## 2018-09-11 DIAGNOSIS — G89.29 CHRONIC BACK PAIN, UNSPECIFIED BACK LOCATION, UNSPECIFIED BACK PAIN LATERALITY: ICD-10-CM

## 2018-09-11 DIAGNOSIS — M54.9 CHRONIC BACK PAIN, UNSPECIFIED BACK LOCATION, UNSPECIFIED BACK PAIN LATERALITY: ICD-10-CM

## 2018-09-11 LAB
APTT: 30.8 SEC (ref 26–36)
INR BLD: 1 (ref 0.86–1.14)
PROTHROMBIN TIME: 11.4 SEC (ref 9.8–13)

## 2018-09-11 PROCEDURE — 85610 PROTHROMBIN TIME: CPT

## 2018-09-11 PROCEDURE — 2709999900 HC NON-CHARGEABLE SUPPLY: Performed by: NEUROLOGICAL SURGERY

## 2018-09-11 PROCEDURE — 7100000010 HC PHASE II RECOVERY - FIRST 15 MIN: Performed by: NEUROLOGICAL SURGERY

## 2018-09-11 PROCEDURE — 2580000003 HC RX 258: Performed by: NEUROLOGICAL SURGERY

## 2018-09-11 PROCEDURE — C1787 PATIENT PROGR, NEUROSTIM: HCPCS | Performed by: NEUROLOGICAL SURGERY

## 2018-09-11 PROCEDURE — 2580000003 HC RX 258: Performed by: ANESTHESIOLOGY

## 2018-09-11 PROCEDURE — 7100000011 HC PHASE II RECOVERY - ADDTL 15 MIN: Performed by: NEUROLOGICAL SURGERY

## 2018-09-11 PROCEDURE — 3600000012 HC SURGERY LEVEL 2 ADDTL 15MIN: Performed by: NEUROLOGICAL SURGERY

## 2018-09-11 PROCEDURE — 3700000001 HC ADD 15 MINUTES (ANESTHESIA): Performed by: NEUROLOGICAL SURGERY

## 2018-09-11 PROCEDURE — 85730 THROMBOPLASTIN TIME PARTIAL: CPT

## 2018-09-11 PROCEDURE — 2500000003 HC RX 250 WO HCPCS: Performed by: NEUROLOGICAL SURGERY

## 2018-09-11 PROCEDURE — 7100000001 HC PACU RECOVERY - ADDTL 15 MIN: Performed by: NEUROLOGICAL SURGERY

## 2018-09-11 PROCEDURE — 3209999900 FLUORO FOR SURGICAL PROCEDURES

## 2018-09-11 PROCEDURE — 72070 X-RAY EXAM THORAC SPINE 2VWS: CPT

## 2018-09-11 PROCEDURE — 6360000002 HC RX W HCPCS: Performed by: NURSE ANESTHETIST, CERTIFIED REGISTERED

## 2018-09-11 PROCEDURE — 7100000000 HC PACU RECOVERY - FIRST 15 MIN: Performed by: NEUROLOGICAL SURGERY

## 2018-09-11 PROCEDURE — 6360000002 HC RX W HCPCS: Performed by: NEUROLOGICAL SURGERY

## 2018-09-11 PROCEDURE — 2500000003 HC RX 250 WO HCPCS: Performed by: NURSE ANESTHETIST, CERTIFIED REGISTERED

## 2018-09-11 PROCEDURE — C1772 INFUSION PUMP, PROGRAMMABLE: HCPCS | Performed by: NEUROLOGICAL SURGERY

## 2018-09-11 PROCEDURE — 6360000002 HC RX W HCPCS: Performed by: ANESTHESIOLOGY

## 2018-09-11 PROCEDURE — 3700000000 HC ANESTHESIA ATTENDED CARE: Performed by: NEUROLOGICAL SURGERY

## 2018-09-11 PROCEDURE — 3600000002 HC SURGERY LEVEL 2 BASE: Performed by: NEUROLOGICAL SURGERY

## 2018-09-11 PROCEDURE — C1755 CATHETER, INTRASPINAL: HCPCS | Performed by: NEUROLOGICAL SURGERY

## 2018-09-11 DEVICE — CATHETER ITH L86CM ASCENDA SPNL SEG: Type: IMPLANTABLE DEVICE | Site: ABDOMEN | Status: FUNCTIONAL

## 2018-09-11 DEVICE — PUMP INFUS 40ML 0.048ML/DAY DIA87.5MM THK19.5MM ITH TI: Type: IMPLANTABLE DEVICE | Site: ABDOMEN | Status: FUNCTIONAL

## 2018-09-11 RX ORDER — ONDANSETRON 2 MG/ML
INJECTION INTRAMUSCULAR; INTRAVENOUS PRN
Status: DISCONTINUED | OUTPATIENT
Start: 2018-09-11 | End: 2018-09-11 | Stop reason: SDUPTHER

## 2018-09-11 RX ORDER — FENTANYL CITRATE 50 UG/ML
25 INJECTION, SOLUTION INTRAMUSCULAR; INTRAVENOUS EVERY 5 MIN PRN
Status: DISCONTINUED | OUTPATIENT
Start: 2018-09-11 | End: 2018-09-11 | Stop reason: HOSPADM

## 2018-09-11 RX ORDER — SODIUM CHLORIDE 0.9 % (FLUSH) 0.9 %
10 SYRINGE (ML) INJECTION EVERY 12 HOURS SCHEDULED
Status: DISCONTINUED | OUTPATIENT
Start: 2018-09-11 | End: 2018-09-11 | Stop reason: HOSPADM

## 2018-09-11 RX ORDER — OXYCODONE AND ACETAMINOPHEN 10; 325 MG/1; MG/1
1 TABLET ORAL EVERY 6 HOURS PRN
Qty: 40 TABLET | Refills: 0 | Status: SHIPPED | OUTPATIENT
Start: 2018-09-11 | End: 2018-10-11

## 2018-09-11 RX ORDER — PROPOFOL 10 MG/ML
INJECTION, EMULSION INTRAVENOUS PRN
Status: DISCONTINUED | OUTPATIENT
Start: 2018-09-11 | End: 2018-09-11 | Stop reason: SDUPTHER

## 2018-09-11 RX ORDER — ONDANSETRON 2 MG/ML
4 INJECTION INTRAMUSCULAR; INTRAVENOUS
Status: DISCONTINUED | OUTPATIENT
Start: 2018-09-11 | End: 2018-09-11 | Stop reason: HOSPADM

## 2018-09-11 RX ORDER — GLYCOPYRROLATE 0.2 MG/ML
INJECTION INTRAMUSCULAR; INTRAVENOUS PRN
Status: DISCONTINUED | OUTPATIENT
Start: 2018-09-11 | End: 2018-09-11 | Stop reason: SDUPTHER

## 2018-09-11 RX ORDER — LIDOCAINE HYDROCHLORIDE 20 MG/ML
INJECTION, SOLUTION EPIDURAL; INFILTRATION; INTRACAUDAL; PERINEURAL PRN
Status: DISCONTINUED | OUTPATIENT
Start: 2018-09-11 | End: 2018-09-11 | Stop reason: SDUPTHER

## 2018-09-11 RX ORDER — SUCCINYLCHOLINE CHLORIDE 20 MG/ML
INJECTION INTRAMUSCULAR; INTRAVENOUS PRN
Status: DISCONTINUED | OUTPATIENT
Start: 2018-09-11 | End: 2018-09-11 | Stop reason: SDUPTHER

## 2018-09-11 RX ORDER — LIDOCAINE HYDROCHLORIDE 10 MG/ML
1 INJECTION, SOLUTION EPIDURAL; INFILTRATION; INTRACAUDAL; PERINEURAL
Status: DISCONTINUED | OUTPATIENT
Start: 2018-09-11 | End: 2018-09-11 | Stop reason: HOSPADM

## 2018-09-11 RX ORDER — ROCURONIUM BROMIDE 10 MG/ML
INJECTION, SOLUTION INTRAVENOUS PRN
Status: DISCONTINUED | OUTPATIENT
Start: 2018-09-11 | End: 2018-09-11 | Stop reason: SDUPTHER

## 2018-09-11 RX ORDER — FENTANYL CITRATE 50 UG/ML
50 INJECTION, SOLUTION INTRAMUSCULAR; INTRAVENOUS EVERY 5 MIN PRN
Status: DISCONTINUED | OUTPATIENT
Start: 2018-09-11 | End: 2018-09-11 | Stop reason: HOSPADM

## 2018-09-11 RX ORDER — FENTANYL CITRATE 50 UG/ML
INJECTION, SOLUTION INTRAMUSCULAR; INTRAVENOUS PRN
Status: DISCONTINUED | OUTPATIENT
Start: 2018-09-11 | End: 2018-09-11 | Stop reason: SDUPTHER

## 2018-09-11 RX ORDER — PROMETHAZINE HYDROCHLORIDE 25 MG/ML
6.25 INJECTION, SOLUTION INTRAMUSCULAR; INTRAVENOUS
Status: DISCONTINUED | OUTPATIENT
Start: 2018-09-11 | End: 2018-09-11 | Stop reason: HOSPADM

## 2018-09-11 RX ORDER — NEOSTIGMINE METHYLSULFATE 5 MG/5 ML
SYRINGE (ML) INTRAVENOUS PRN
Status: DISCONTINUED | OUTPATIENT
Start: 2018-09-11 | End: 2018-09-11 | Stop reason: SDUPTHER

## 2018-09-11 RX ORDER — SODIUM CHLORIDE, SODIUM LACTATE, POTASSIUM CHLORIDE, CALCIUM CHLORIDE 600; 310; 30; 20 MG/100ML; MG/100ML; MG/100ML; MG/100ML
INJECTION, SOLUTION INTRAVENOUS CONTINUOUS
Status: DISCONTINUED | OUTPATIENT
Start: 2018-09-11 | End: 2018-09-11 | Stop reason: HOSPADM

## 2018-09-11 RX ORDER — OXYCODONE HYDROCHLORIDE AND ACETAMINOPHEN 5; 325 MG/1; MG/1
1 TABLET ORAL EVERY 6 HOURS PRN
Qty: 28 TABLET | Refills: 0 | Status: CANCELLED | OUTPATIENT
Start: 2018-09-11 | End: 2018-09-18

## 2018-09-11 RX ORDER — DEXAMETHASONE SODIUM PHOSPHATE 4 MG/ML
INJECTION, SOLUTION INTRA-ARTICULAR; INTRALESIONAL; INTRAMUSCULAR; INTRAVENOUS; SOFT TISSUE PRN
Status: DISCONTINUED | OUTPATIENT
Start: 2018-09-11 | End: 2018-09-11 | Stop reason: SDUPTHER

## 2018-09-11 RX ORDER — MIDAZOLAM HYDROCHLORIDE 1 MG/ML
INJECTION INTRAMUSCULAR; INTRAVENOUS PRN
Status: DISCONTINUED | OUTPATIENT
Start: 2018-09-11 | End: 2018-09-11 | Stop reason: SDUPTHER

## 2018-09-11 RX ORDER — SODIUM CHLORIDE 0.9 % (FLUSH) 0.9 %
10 SYRINGE (ML) INJECTION PRN
Status: DISCONTINUED | OUTPATIENT
Start: 2018-09-11 | End: 2018-09-11 | Stop reason: HOSPADM

## 2018-09-11 RX ADMIN — MIDAZOLAM HYDROCHLORIDE 2 MG: 1 INJECTION INTRAMUSCULAR; INTRAVENOUS at 07:30

## 2018-09-11 RX ADMIN — ROCURONIUM BROMIDE 50 MG: 10 INJECTION, SOLUTION INTRAVENOUS at 07:46

## 2018-09-11 RX ADMIN — FENTANYL CITRATE 100 MCG: 50 INJECTION INTRAMUSCULAR; INTRAVENOUS at 07:42

## 2018-09-11 RX ADMIN — SUCCINYLCHOLINE CHLORIDE 140 MG: 20 INJECTION, SOLUTION INTRAMUSCULAR; INTRAVENOUS; PARENTERAL at 07:42

## 2018-09-11 RX ADMIN — ONDANSETRON 4 MG: 2 INJECTION INTRAMUSCULAR; INTRAVENOUS at 08:00

## 2018-09-11 RX ADMIN — FENTANYL CITRATE 25 MCG: 50 INJECTION INTRAMUSCULAR; INTRAVENOUS at 10:30

## 2018-09-11 RX ADMIN — PHENYLEPHRINE HYDROCHLORIDE 80 MCG: 10 INJECTION, SOLUTION INTRAMUSCULAR; INTRAVENOUS; SUBCUTANEOUS at 08:20

## 2018-09-11 RX ADMIN — ROCURONIUM BROMIDE 10 MG: 10 INJECTION, SOLUTION INTRAVENOUS at 08:10

## 2018-09-11 RX ADMIN — SODIUM CHLORIDE, SODIUM LACTATE, POTASSIUM CHLORIDE, AND CALCIUM CHLORIDE: 600; 310; 30; 20 INJECTION, SOLUTION INTRAVENOUS at 06:12

## 2018-09-11 RX ADMIN — LIDOCAINE HYDROCHLORIDE 100 MG: 20 INJECTION, SOLUTION EPIDURAL; INFILTRATION; INTRACAUDAL; PERINEURAL at 07:42

## 2018-09-11 RX ADMIN — PHENYLEPHRINE HYDROCHLORIDE 80 MCG: 10 INJECTION, SOLUTION INTRAMUSCULAR; INTRAVENOUS; SUBCUTANEOUS at 08:12

## 2018-09-11 RX ADMIN — FENTANYL CITRATE 25 MCG: 50 INJECTION, SOLUTION INTRAMUSCULAR; INTRAVENOUS at 11:24

## 2018-09-11 RX ADMIN — GLYCOPYRROLATE 0.6 MG: 0.2 INJECTION, SOLUTION INTRAMUSCULAR; INTRAVENOUS at 09:34

## 2018-09-11 RX ADMIN — FENTANYL CITRATE 100 MCG: 50 INJECTION INTRAMUSCULAR; INTRAVENOUS at 07:50

## 2018-09-11 RX ADMIN — PROPOFOL 200 MG: 10 INJECTION, EMULSION INTRAVENOUS at 07:42

## 2018-09-11 RX ADMIN — DEXAMETHASONE SODIUM PHOSPHATE 4 MG: 4 INJECTION, SOLUTION INTRAMUSCULAR; INTRAVENOUS at 08:00

## 2018-09-11 RX ADMIN — CEFAZOLIN SODIUM 3 G: 1 POWDER, FOR SOLUTION INTRAMUSCULAR; INTRAVENOUS at 07:53

## 2018-09-11 RX ADMIN — Medication 3 MG: at 09:34

## 2018-09-11 RX ADMIN — SODIUM CHLORIDE, SODIUM LACTATE, POTASSIUM CHLORIDE, AND CALCIUM CHLORIDE: 600; 310; 30; 20 INJECTION, SOLUTION INTRAVENOUS at 08:30

## 2018-09-11 ASSESSMENT — PAIN DESCRIPTION - PAIN TYPE
TYPE: SURGICAL PAIN

## 2018-09-11 ASSESSMENT — PULMONARY FUNCTION TESTS
PIF_VALUE: 20
PIF_VALUE: 1
PIF_VALUE: 20
PIF_VALUE: 18
PIF_VALUE: 17
PIF_VALUE: 20
PIF_VALUE: 1
PIF_VALUE: 16
PIF_VALUE: 1
PIF_VALUE: 16
PIF_VALUE: 16
PIF_VALUE: 20
PIF_VALUE: 20
PIF_VALUE: 19
PIF_VALUE: 20
PIF_VALUE: 20
PIF_VALUE: 21
PIF_VALUE: 20
PIF_VALUE: 22
PIF_VALUE: 20
PIF_VALUE: 20
PIF_VALUE: 1
PIF_VALUE: 20
PIF_VALUE: 11
PIF_VALUE: 1
PIF_VALUE: 20
PIF_VALUE: 20
PIF_VALUE: 1
PIF_VALUE: 16
PIF_VALUE: 0
PIF_VALUE: 16
PIF_VALUE: 24
PIF_VALUE: 20
PIF_VALUE: 19
PIF_VALUE: 16
PIF_VALUE: 20
PIF_VALUE: 11
PIF_VALUE: 20
PIF_VALUE: 21
PIF_VALUE: 20
PIF_VALUE: 22
PIF_VALUE: 1
PIF_VALUE: 19
PIF_VALUE: 20
PIF_VALUE: 21
PIF_VALUE: 20
PIF_VALUE: 19
PIF_VALUE: 20
PIF_VALUE: 1
PIF_VALUE: 16
PIF_VALUE: 20
PIF_VALUE: 6
PIF_VALUE: 20
PIF_VALUE: 3
PIF_VALUE: 21
PIF_VALUE: 20
PIF_VALUE: 30
PIF_VALUE: 18
PIF_VALUE: 20
PIF_VALUE: 1
PIF_VALUE: 20
PIF_VALUE: 20
PIF_VALUE: 1
PIF_VALUE: 20
PIF_VALUE: 21
PIF_VALUE: 28
PIF_VALUE: 20
PIF_VALUE: 1
PIF_VALUE: 20
PIF_VALUE: 8
PIF_VALUE: 20
PIF_VALUE: 30
PIF_VALUE: 20
PIF_VALUE: 22
PIF_VALUE: 20
PIF_VALUE: 19
PIF_VALUE: 20
PIF_VALUE: 16
PIF_VALUE: 1
PIF_VALUE: 23
PIF_VALUE: 20
PIF_VALUE: 20
PIF_VALUE: 19
PIF_VALUE: 1
PIF_VALUE: 19
PIF_VALUE: 20

## 2018-09-11 ASSESSMENT — PAIN DESCRIPTION - LOCATION
LOCATION: ABDOMEN
LOCATION: ABDOMEN
LOCATION: ABDOMEN;INCISION
LOCATION: ABDOMEN;INCISION

## 2018-09-11 ASSESSMENT — LIFESTYLE VARIABLES: SMOKING_STATUS: 1

## 2018-09-11 ASSESSMENT — PAIN SCALES - GENERAL
PAINLEVEL_OUTOF10: 5
PAINLEVEL_OUTOF10: 9
PAINLEVEL_OUTOF10: 2
PAINLEVEL_OUTOF10: 3
PAINLEVEL_OUTOF10: 4

## 2018-09-11 ASSESSMENT — PAIN DESCRIPTION - DESCRIPTORS
DESCRIPTORS: CONSTANT;ACHING
DESCRIPTORS: SORE
DESCRIPTORS: SHARP

## 2018-09-11 ASSESSMENT — PAIN - FUNCTIONAL ASSESSMENT: PAIN_FUNCTIONAL_ASSESSMENT: 0-10

## 2018-09-11 NOTE — ANESTHESIA PRE PROCEDURE
Rueben Collet, APRN - CNP   esomeprazole (NEXIUM) 40 MG capsule Take 1 capsule by mouth 2 times daily 7/5/16 8/1/18  [employer], MD   hyoscyamine (LEVSIN/SL) 0.125 MG SL tablet Place 1 tablet under the tongue every 4 hours as needed for Cramping 6/29/16   Blank Maldonado MD   Misc. Devices [facility]) MISC 1 each by Does not apply route daily as needed. 3/29/15   Rueben Collet, APRN - CNP   NEEDLE, DISP, 18 G 18G X 1-1/2\" MISC 1 each by Does not apply route once a week. To draw up Testosterone 12/10/14   Rueben Collet, APRN - CNP   Syringe/Needle, Disp, (SYRINGE 3CC/25GX1\") 25G X 1\" 3 ML MISC 1 each by Does not apply route every 30 days. 11/12/14   Rueben Collet, APRN - CNP   Handicap Placard MISC by Does not apply route. Expires 1/10/2019.   Dx Chronic back pain .5; 338.29 1/10/14   Rueben Collet, APRN - CNP       Current medications:    Current Facility-Administered Medications   Medication Dose Route Frequency Provider Last Rate Last Dose    ceFAZolin (ANCEF) 3 g in dextrose 5 % 100 mL IVPB  3 g Intravenous Once Audelia San MD        lactated ringers infusion   Intravenous Continuous Reece Collins  mL/hr at 09/11/18 0612      sodium chloride flush 0.9 % injection 10 mL  10 mL Intravenous 2 times per day Reece Collins MD        sodium chloride flush 0.9 % injection 10 mL  10 mL Intravenous PRN Reece Collins MD        lidocaine PF 1 % injection 1 mL  1 mL Intradermal Once PRN Reece Collins MD           Allergies:  No Known Allergies    Problem List:    Patient Active Problem List   Diagnosis Code    Hyperlipidemia E78.5    Depression F32.9    GERD (gastroesophageal reflux disease) K21.9    Chronic low back pain M54.5, G89.29    Anxiety F41.9    Tobacco dependence F17.200    Hyperthyroidism E05.90    Recurrent ventral incisional hernia K43.2    Vitamin D deficiency E55.9    Hypogonadotropic hypogonadism ([address] E23.0    Chronic pain G89.29    Right acute serous otitis media H65.01       Past Medical History:        Diagnosis Date    Abnormal thyroid blood test     Anxiety     Back pain     lower back pain    Depression     Elevated homocysteine (HCC)     Fibromyalgia     GERD (gastroesophageal reflux disease)     Hypercholesteremia     Hypogonadotropic hypogonadism (HCC)     Neuropathy        Past Surgical History:        Procedure Laterality Date    EPIDIDYMECTOMY      left    EPIGASTRIC HERNIA REPAIR      HERNIA REPAIR  11/6/2013    LAPAROSCOPIC REPAIR RECURRENT INCISONAL HERNIA WITH MESH    KIDNEY SURGERY      ureter pinched shut on left, did not improve kidney function and left kidney was removed    NECK SURGERY      mass removed    OTHER SURGICAL HISTORY      spinal cord stimulator    TONSILLECTOMY AND ADENOIDECTOMY         Social History:    Social History   Substance Use Topics    Smoking status: Current Every Day Smoker     Packs/day: 1.50     Years: 15.00     Types: Cigarettes    Smokeless tobacco: Never Used      Comment: pt denies counciling    Alcohol use No      Comment: Does not drink                                Ready to quit: Not Answered  Counseling given: Not Answered      Vital Signs (Current):   Vitals:    09/07/18 1339 09/11/18 0545   BP:  111/76   Pulse:  94   Resp:  18   Temp:  98.2 °F (36.8 °C)   TempSrc:  Oral   SpO2:  95%   Weight: 275 lb (124.7 kg) 275 lb (124.7 kg)   Height: 6' (1.829 m) 6' (1.829 m)                                              BP Readings from Last 3 Encounters:   09/11/18 111/76   09/05/18 130/80   08/01/18 126/88       NPO Status: Time of last liquid consumption: 0500 (sip  to rinse mouth)                        Time of last solid consumption: 2300                        Date of last liquid consumption: 09/11/18                        Date of last solid food consumption: 09/10/18    BMI:   Wt Readings from Last 3 Encounters:   09/11/18 275 lb (124.7 kg) 09/05/18 275 lb 6.4 oz (124.9 kg)   08/01/18 269 lb 12.8 oz (122.4 kg)     Body mass index is 37.3 kg/m². CBC:   Lab Results   Component Value Date    WBC 9.9 09/05/2018    RBC 5.10 09/05/2018    HGB 15.2 09/05/2018    HCT 46.1 09/05/2018    MCV 90.4 09/05/2018    RDW 14.9 09/05/2018     09/05/2018       CMP:   Lab Results   Component Value Date     09/05/2018    K 4.3 09/05/2018     09/05/2018    CO2 22 09/05/2018    BUN 5 09/05/2018    CREATININE 0.9 09/05/2018    GFRAA >60 09/05/2018    GFRAA >60 11/19/2012    AGRATIO 1.5 12/20/2016    LABGLOM >60 09/05/2018    GLUCOSE 112 09/05/2018    PROT 6.9 12/20/2016    PROT 6.7 11/19/2012    CALCIUM 9.2 09/05/2018    BILITOT 0.4 12/20/2016    ALKPHOS 70 12/20/2016    AST 17 12/20/2016    ALT 16 12/20/2016       POC Tests: No results for input(s): POCGLU, POCNA, POCK, POCCL, POCBUN, POCHEMO, POCHCT in the last 72 hours. Coags:   Lab Results   Component Value Date    PROTIME 11.4 09/11/2018    INR 1.00 09/11/2018       HCG (If Applicable): No results found for: PREGTESTUR, PREGSERUM, HCG, HCGQUANT     ABGs: No results found for: PHART, PO2ART, FOL0VBX, RCE8MLK, BEART, B5OODVSW     Type & Screen (If Applicable):  No results found for: LABABO, [address]    Anesthesia Evaluation  Patient summary reviewed and Nursing notes reviewed no history of anesthetic complications:   Airway: Mallampati: III  TM distance: >3 FB   Neck ROM: full  Mouth opening: > = 3 FB Dental: normal exam   (+) edentulous      Pulmonary:Negative Pulmonary ROS and normal exam    (+) current smoker          Patient smoked on day of surgery. Cardiovascular:    (+) hyperlipidemia                  Neuro/Psych:   (+) depression/anxiety              ROS comment: Back pain  Fibromyalgia    Neuropathy GI/Hepatic/Renal:   (+) GERD: well controlled, morbid obesity     (-) hiatal hernia      ROS comment: Hypogonadotropic hypogonadism.    Endo/Other: Negative Endo/Other ROS Abdominal:           Vascular: negative vascular ROS. Anesthesia Plan      general     ASA 3       Induction: intravenous. MIPS: Postoperative opioids intended. Anesthetic plan and risks discussed with patient. Plan discussed with CRNA.     Attending anesthesiologist reviewed and agrees with Digna Chang MD   9/11/2018

## 2018-09-11 NOTE — PROGRESS NOTES
Ambulatory Surgery/Procedure Discharge Note    Vitals:    09/11/18 1114   BP: 121/78   Pulse: 97   Resp: 16   Temp: 97.7 °F (36.5 °C)   SpO2: 91%       In: 2770 [P.O.:120; I.V.:2650]  Out: 40     Pain assessment:  present - adequately treated  Pain Level: 5    Patient discharged to home/self care.  Patient discharged via wheel chair by transporter to waiting family/S.O.       9/11/2018 11:49 AM

## 2018-09-11 NOTE — PROGRESS NOTES
Arrived from [place] [facility] [address] to PACU#[id number].  Report taken from CRNA< who states pt stable during procedure

## 2018-09-11 NOTE — H&P
Admission:      Prior to Admission medications    Medication Sig Start Date End Date Taking? Authorizing Provider   diazepam (VALIUM) 10 MG tablet Take 1 tablet by mouth every 6 hours as needed for Anxiety for up to 90 days. Patient takes 4 times daily. 8/1/18 10/30/18 Yes Corbin Oleary MD   cyclobenzaprine (FLEXERIL) 10 MG tablet Take 1 tablet by mouth 3 times daily as needed for Muscle spasms 8/1/18 10/30/18 Yes Corbin Oleary MD   oxyCODONE-acetaminophen (PERCOCET)  MG per tablet Take 1 tablet by mouth every 4 hours as needed for Pain . Yes Historical Provider, MD   melatonin 3 MG TABS tablet Take 6 mg by mouth daily   Yes Historical Provider, MD   QUEtiapine (SEROQUEL) 100 MG tablet Take 1 tablet by mouth nightly 5/11/18   Corbin Oleary MD   DULoxetine (CYMBALTA) 60 MG extended release capsule Take 1 capsule by mouth 2 times daily 2/13/18   Corbin Olaery MD   cyanocobalamin 1000 MCG/ML injection INJECT 1 ML INTO THE MUSCLE EVERY 14 DAYS 8/17/17   Corbin Oleary MD   testosterone cypionate (DEPOTESTOTERONE CYPIONATE) 200 MG/ML injection Inject 0.6 mLs into the muscle once a week 6/30/17   Corbin Oleary MD   atorvastatin (LIPITOR) 10 MG tablet Take 1 tablet by mouth nightly 4/11/17   STEFFANIE William CNP   omega-3 acid ethyl esters (LOVAZA) 1 G capsule Take 2 capsules by mouth 2 times daily 3/1/17   STEFFANIE William CNP   ergocalciferol (DRISDOL) 29543 UNITS capsule Take 2 capsules by mouth once a week 2/15/17   STEFFANIE William CNP   Folinic Acid-Vit B6-Vit B12 (FOLINIC-PLUS) 4-50-2 MG TABS Take 1 tablet by mouth daily 7/6/16   STEFFANIE William CNP   esomeprazole (NEXIUM) 40 MG capsule Take 1 capsule by mouth 2 times daily 7/5/16 8/1/18  Gibran Pierre MD   hyoscyamine (LEVSIN/SL) 0.125 MG SL tablet Place 1 tablet under the tongue every 4 hours as needed for Cramping 6/29/16   Corbin Oleary MD   Misc.  Devices [facility]) MISC 1 each by Does not apply route daily as needed. 3/29/15   Yariel Sine, APRN - CNP   NEEDLE, DISP, 18 G 18G X 1-1/2\" MISC 1 each by Does not apply route once a week. To draw up Testosterone 12/10/14   Yariel Sine, APRN - CNP   Syringe/Needle, Disp, (SYRINGE 3CC/25GX1\") 25G X 1\" 3 ML MISC 1 each by Does not apply route every 30 days. 11/12/14   Yariel Sine, APRN - CNP   Handicap Placard MISC by Does not apply route. Expires 1/10/2019. Dx Chronic back pain .5; 338.29 1/10/14   Yariel Sine, APRN - CNP         Allergies:  Patient has no known allergies. PHYSICAL EXAM:      /76   Pulse 94   Temp 98.2 °F (36.8 °C) (Oral)   Resp 18   Ht 6' (1.829 m)   Wt 275 lb (124.7 kg)   SpO2 95%   BMI 37.30 kg/m²      Heart:  regular rate and rhythm, no murmur    Lungs:  No increased work of breathing, good air exchange, clear to auscultation bilaterally, no crackles or wheezing    Abdomen:  soft, non-distended, non-tender, no rebound tenderness or guarding, normal active bowel sounds and no masses palpated    ASSESSMENT AND PLAN:    1. Patient seen and focused exam done today- no new changes since last physical exam on 9-5-2018    2. Access to ancillary services are available per request of the provider.     [place], [place]     9/11/2018

## 2018-09-12 NOTE — OP NOTE
pressure. Catheter was easily threaded to T10 under fluoroscopic  guidance with good CSF flow from the catheter. A 2-0 silk cerclage suture  was placed around the fascial entry point, needle and dilator removed  without difficulty. Injex anchor was then deployed and sewn down to the  fascia using 2-0 silk suture. Incision was made in the left abdomen in the  horizontal fashion, cut down to the abdominal fascia using Bovie cautery. A subfascial dissection is then performed, sized for a 40 mL SynchroMed II  pump. Pump is left with saline. Tunneling is then performed for the  catheter without any trimming, is connected to the extension. Extension is  connected to the pump. The pump is placed into the subfascial pocket. Aspiration from the site port is performed and 2 mL clear CSF is easily  aspirated without difficulty. Both wounds were then irrigated, hemostased,  and closed. Two interrupted Vicryl suture for fascia in a mattress tunnel  fashion, two interrupted for the adipose layers, three interrupted 4-0  Monocryl and Dermabond for the skin. Lumbar fascia was closed in a similar  fashion. The patient was then flipped on his back, reversed from  anesthesia, extubated, and taken to recovery room in good, stable  condition. I certify that I was present and available for the entire  procedure.         Spencer Gallardo MD    D: 09/11/2018 15:51:14       T: 09/11/2018 17:07:36     [email or web address]  Job#: [id number]     Doc#: [id number]    CC:

## 2018-10-24 DIAGNOSIS — E55.9 VITAMIN D DEFICIENCY: ICD-10-CM

## 2018-10-24 DIAGNOSIS — R53.83 FATIGUE, UNSPECIFIED TYPE: ICD-10-CM

## 2018-10-24 DIAGNOSIS — E23.0 HYPOGONADOTROPIC HYPOGONADISM (HCC): ICD-10-CM

## 2018-10-24 LAB
A/G RATIO: 2.1 (ref 1.1–2.2)
ALBUMIN SERPL-MCNC: 4.5 G/DL (ref 3.4–5)
ALP BLD-CCNC: 82 U/L (ref 40–129)
ALT SERPL-CCNC: 23 U/L (ref 10–40)
ANION GAP SERPL CALCULATED.3IONS-SCNC: 13 MMOL/L (ref 3–16)
AST SERPL-CCNC: 18 U/L (ref 15–37)
BILIRUB SERPL-MCNC: <0.2 MG/DL (ref 0–1)
BUN BLDV-MCNC: 7 MG/DL (ref 7–20)
CALCIUM SERPL-MCNC: 9.4 MG/DL (ref 8.3–10.6)
CHLORIDE BLD-SCNC: 99 MMOL/L (ref 99–110)
CO2: 24 MMOL/L (ref 21–32)
CREAT SERPL-MCNC: 1 MG/DL (ref 0.9–1.3)
GFR AFRICAN AMERICAN: >60
GFR NON-AFRICAN AMERICAN: >60
GLOBULIN: 2.1 G/DL
GLUCOSE BLD-MCNC: 101 MG/DL (ref 70–99)
HCT VFR BLD CALC: 45.3 % (ref 40.5–52.5)
HEMOGLOBIN: 15.1 G/DL (ref 13.5–17.5)
MCH RBC QN AUTO: 29.6 PG (ref 26–34)
MCHC RBC AUTO-ENTMCNC: 33.4 G/DL (ref 31–36)
MCV RBC AUTO: 88.6 FL (ref 80–100)
PDW BLD-RTO: 14.5 % (ref 12.4–15.4)
PLATELET # BLD: 238 K/UL (ref 135–450)
PMV BLD AUTO: 9.5 FL (ref 5–10.5)
POTASSIUM SERPL-SCNC: 4.4 MMOL/L (ref 3.5–5.1)
RBC # BLD: 5.11 M/UL (ref 4.2–5.9)
SODIUM BLD-SCNC: 136 MMOL/L (ref 136–145)
TOTAL PROTEIN: 6.6 G/DL (ref 6.4–8.2)
VITAMIN B-12: 427 PG/ML (ref 211–911)
VITAMIN D 25-HYDROXY: 16.5 NG/ML
WBC # BLD: 7.8 K/UL (ref 4–11)

## 2018-10-25 ENCOUNTER — OFFICE VISIT (OUTPATIENT)
Dept: FAMILY MEDICINE CLINIC | Age: 38
End: 2018-10-25
Payer: MEDICARE

## 2018-10-25 VITALS
DIASTOLIC BLOOD PRESSURE: 74 MMHG | WEIGHT: 277 LBS | BODY MASS INDEX: 37.52 KG/M2 | HEART RATE: 113 BPM | OXYGEN SATURATION: 96 % | HEIGHT: 72 IN | SYSTOLIC BLOOD PRESSURE: 122 MMHG

## 2018-10-25 DIAGNOSIS — R53.83 FATIGUE, UNSPECIFIED TYPE: ICD-10-CM

## 2018-10-25 DIAGNOSIS — F41.9 ANXIETY: Primary | ICD-10-CM

## 2018-10-25 DIAGNOSIS — E23.0 HYPOGONADOTROPIC HYPOGONADISM (HCC): ICD-10-CM

## 2018-10-25 DIAGNOSIS — E55.9 VITAMIN D DEFICIENCY: ICD-10-CM

## 2018-10-25 PROCEDURE — G8417 CALC BMI ABV UP PARAM F/U: HCPCS | Performed by: FAMILY MEDICINE

## 2018-10-25 PROCEDURE — G8484 FLU IMMUNIZE NO ADMIN: HCPCS | Performed by: FAMILY MEDICINE

## 2018-10-25 PROCEDURE — 99214 OFFICE O/P EST MOD 30 MIN: CPT | Performed by: FAMILY MEDICINE

## 2018-10-25 PROCEDURE — G8427 DOCREV CUR MEDS BY ELIG CLIN: HCPCS | Performed by: FAMILY MEDICINE

## 2018-10-25 PROCEDURE — 4004F PT TOBACCO SCREEN RCVD TLK: CPT | Performed by: FAMILY MEDICINE

## 2018-10-25 RX ORDER — CYCLOBENZAPRINE HCL 10 MG
10 TABLET ORAL 3 TIMES DAILY PRN
Qty: 90 TABLET | Refills: 2 | Status: SHIPPED | OUTPATIENT
Start: 2018-10-25 | End: 2019-01-23 | Stop reason: SDUPTHER

## 2018-10-25 RX ORDER — DIAZEPAM 10 MG/1
10 TABLET ORAL EVERY 6 HOURS PRN
Qty: 120 TABLET | Refills: 2 | Status: SHIPPED | OUTPATIENT
Start: 2018-10-25 | End: 2019-01-23 | Stop reason: SDUPTHER

## 2018-10-25 NOTE — PROGRESS NOTES
cyclobenzaprine (FLEXERIL) 10 MG tablet;  Take 1 tablet by mouth 3 times daily as needed for Muscle spasms             Mervin Jensen MD

## 2018-10-26 LAB
SEX HORMONE BINDING GLOBULIN: 47 NMOL/L (ref 11–80)
TESTOSTERONE FREE-NONMALE: 12.4 PG/ML (ref 47–244)
TESTOSTERONE TOTAL: 85 NG/DL (ref 220–1000)

## 2018-10-26 RX ORDER — SYRINGE W-NEEDLE,DISPOSAB,3 ML 25GX5/8"
SYRINGE, EMPTY DISPOSABLE MISCELLANEOUS
Qty: 12 EACH | Refills: 0 | Status: SHIPPED | OUTPATIENT
Start: 2018-10-26 | End: 2020-02-04

## 2018-10-26 RX ORDER — TESTOSTERONE CYPIONATE 100 MG/ML
100 INJECTION, SOLUTION INTRAMUSCULAR WEEKLY
Qty: 4 ML | Refills: 2 | Status: SHIPPED | OUTPATIENT
Start: 2018-10-26 | End: 2019-01-23

## 2018-10-29 ENCOUNTER — TELEPHONE (OUTPATIENT)
Dept: FAMILY MEDICINE CLINIC | Age: 38
End: 2018-10-29

## 2018-10-29 DIAGNOSIS — E23.0 HYPOGONADOTROPIC HYPOGONADISM (HCC): Primary | ICD-10-CM

## 2018-10-29 RX ORDER — TESTOSTERONE CYPIONATE 200 MG/ML
100 INJECTION INTRAMUSCULAR WEEKLY
Qty: 2 ML | Refills: 2 | Status: SHIPPED | OUTPATIENT
Start: 2018-10-29 | End: 2018-10-31 | Stop reason: SDUPTHER

## 2018-10-31 RX ORDER — TESTOSTERONE CYPIONATE 200 MG/ML
100 INJECTION INTRAMUSCULAR WEEKLY
Qty: 4 ML | Refills: 2 | Status: SHIPPED | OUTPATIENT
Start: 2018-10-31 | End: 2019-03-21 | Stop reason: SDUPTHER

## 2019-01-23 ENCOUNTER — OFFICE VISIT (OUTPATIENT)
Dept: FAMILY MEDICINE CLINIC | Age: 39
End: 2019-01-23
Payer: MEDICARE

## 2019-01-23 VITALS
HEIGHT: 72 IN | BODY MASS INDEX: 36.7 KG/M2 | HEART RATE: 115 BPM | SYSTOLIC BLOOD PRESSURE: 124 MMHG | DIASTOLIC BLOOD PRESSURE: 80 MMHG | OXYGEN SATURATION: 98 % | WEIGHT: 271 LBS

## 2019-01-23 DIAGNOSIS — F41.9 ANXIETY: ICD-10-CM

## 2019-01-23 DIAGNOSIS — Z51.81 MEDICATION MONITORING ENCOUNTER: ICD-10-CM

## 2019-01-23 DIAGNOSIS — E23.0 HYPOGONADOTROPIC HYPOGONADISM (HCC): ICD-10-CM

## 2019-01-23 DIAGNOSIS — E23.0 HYPOGONADOTROPIC HYPOGONADISM (HCC): Primary | ICD-10-CM

## 2019-01-23 DIAGNOSIS — K59.03 DRUG-INDUCED CONSTIPATION: ICD-10-CM

## 2019-01-23 LAB
HCT VFR BLD CALC: 49.1 % (ref 40.5–52.5)
HEMOGLOBIN: 16.3 G/DL (ref 13.5–17.5)
MCH RBC QN AUTO: 30.1 PG (ref 26–34)
MCHC RBC AUTO-ENTMCNC: 33.3 G/DL (ref 31–36)
MCV RBC AUTO: 90.6 FL (ref 80–100)
PDW BLD-RTO: 14.6 % (ref 12.4–15.4)
PLATELET # BLD: 275 K/UL (ref 135–450)
PMV BLD AUTO: 9.4 FL (ref 5–10.5)
RBC # BLD: 5.42 M/UL (ref 4.2–5.9)
WBC # BLD: 10.5 K/UL (ref 4–11)

## 2019-01-23 PROCEDURE — G8417 CALC BMI ABV UP PARAM F/U: HCPCS | Performed by: FAMILY MEDICINE

## 2019-01-23 PROCEDURE — G8484 FLU IMMUNIZE NO ADMIN: HCPCS | Performed by: FAMILY MEDICINE

## 2019-01-23 PROCEDURE — 4004F PT TOBACCO SCREEN RCVD TLK: CPT | Performed by: FAMILY MEDICINE

## 2019-01-23 PROCEDURE — G8427 DOCREV CUR MEDS BY ELIG CLIN: HCPCS | Performed by: FAMILY MEDICINE

## 2019-01-23 PROCEDURE — 99214 OFFICE O/P EST MOD 30 MIN: CPT | Performed by: FAMILY MEDICINE

## 2019-01-23 RX ORDER — CYCLOBENZAPRINE HCL 10 MG
10 TABLET ORAL 3 TIMES DAILY PRN
Qty: 90 TABLET | Refills: 2 | Status: SHIPPED | OUTPATIENT
Start: 2019-01-23 | End: 2019-04-15 | Stop reason: SDUPTHER

## 2019-01-23 RX ORDER — DIAZEPAM 10 MG/1
10 TABLET ORAL EVERY 6 HOURS PRN
Qty: 120 TABLET | Refills: 2 | Status: SHIPPED | OUTPATIENT
Start: 2019-01-23 | End: 2019-04-15 | Stop reason: SDUPTHER

## 2019-01-23 RX ORDER — LUBIPROSTONE 24 UG/1
24 CAPSULE, GELATIN COATED ORAL 2 TIMES DAILY WITH MEALS
Qty: 60 CAPSULE | Refills: 2 | Status: SHIPPED | OUTPATIENT
Start: 2019-01-23 | End: 2020-02-04

## 2019-01-25 PROBLEM — K59.03 DRUG-INDUCED CONSTIPATION: Status: ACTIVE | Noted: 2019-01-25

## 2019-01-25 LAB
SEX HORMONE BINDING GLOBULIN: 44 NMOL/L (ref 11–80)
TESTOSTERONE FREE-NONMALE: 52.2 PG/ML (ref 47–244)
TESTOSTERONE TOTAL: 317 NG/DL (ref 220–1000)

## 2019-01-25 ASSESSMENT — ENCOUNTER SYMPTOMS: RESPIRATORY NEGATIVE: 1

## 2019-02-15 RX ORDER — DULOXETIN HYDROCHLORIDE 60 MG/1
60 CAPSULE, DELAYED RELEASE ORAL 2 TIMES DAILY
Qty: 180 CAPSULE | Refills: 1 | Status: SHIPPED | OUTPATIENT
Start: 2019-02-15 | End: 2019-04-15 | Stop reason: SDUPTHER

## 2019-03-21 DIAGNOSIS — E23.0 HYPOGONADOTROPIC HYPOGONADISM (HCC): ICD-10-CM

## 2019-03-21 RX ORDER — TESTOSTERONE CYPIONATE 200 MG/ML
INJECTION INTRAMUSCULAR
Qty: 4 ML | Refills: 2 | Status: SHIPPED | OUTPATIENT
Start: 2019-03-21 | End: 2019-12-27

## 2019-04-15 ENCOUNTER — OFFICE VISIT (OUTPATIENT)
Dept: FAMILY MEDICINE CLINIC | Age: 39
End: 2019-04-15
Payer: MEDICARE

## 2019-04-15 VITALS
HEART RATE: 98 BPM | BODY MASS INDEX: 38.47 KG/M2 | OXYGEN SATURATION: 97 % | HEIGHT: 72 IN | WEIGHT: 284 LBS | SYSTOLIC BLOOD PRESSURE: 112 MMHG | DIASTOLIC BLOOD PRESSURE: 70 MMHG

## 2019-04-15 DIAGNOSIS — E23.0 HYPOGONADOTROPIC HYPOGONADISM (HCC): ICD-10-CM

## 2019-04-15 DIAGNOSIS — F41.9 ANXIETY: ICD-10-CM

## 2019-04-15 DIAGNOSIS — R97.20 ELEVATED PSA: ICD-10-CM

## 2019-04-15 DIAGNOSIS — E78.00 PURE HYPERCHOLESTEROLEMIA: ICD-10-CM

## 2019-04-15 DIAGNOSIS — N41.0 ACUTE PROSTATITIS: ICD-10-CM

## 2019-04-15 DIAGNOSIS — E23.0 HYPOGONADOTROPIC HYPOGONADISM (HCC): Primary | ICD-10-CM

## 2019-04-15 LAB
A/G RATIO: 1.8 (ref 1.1–2.2)
ALBUMIN SERPL-MCNC: 4.2 G/DL (ref 3.4–5)
ALP BLD-CCNC: 78 U/L (ref 40–129)
ALT SERPL-CCNC: 11 U/L (ref 10–40)
ANION GAP SERPL CALCULATED.3IONS-SCNC: 13 MMOL/L (ref 3–16)
AST SERPL-CCNC: 11 U/L (ref 15–37)
BILIRUB SERPL-MCNC: <0.2 MG/DL (ref 0–1)
BUN BLDV-MCNC: 5 MG/DL (ref 7–20)
CALCIUM SERPL-MCNC: 9.1 MG/DL (ref 8.3–10.6)
CHLORIDE BLD-SCNC: 99 MMOL/L (ref 99–110)
CHOLESTEROL, TOTAL: 221 MG/DL (ref 0–199)
CO2: 25 MMOL/L (ref 21–32)
CREAT SERPL-MCNC: 0.9 MG/DL (ref 0.9–1.3)
GFR AFRICAN AMERICAN: >60
GFR NON-AFRICAN AMERICAN: >60
GLOBULIN: 2.4 G/DL
GLUCOSE BLD-MCNC: 100 MG/DL (ref 70–99)
HCT VFR BLD CALC: 47.8 % (ref 40.5–52.5)
HDLC SERPL-MCNC: 18 MG/DL (ref 40–60)
HEMOGLOBIN: 15.9 G/DL (ref 13.5–17.5)
LDL CHOLESTEROL CALCULATED: ABNORMAL MG/DL
LDL CHOLESTEROL DIRECT: 163 MG/DL
MCH RBC QN AUTO: 30.7 PG (ref 26–34)
MCHC RBC AUTO-ENTMCNC: 33.1 G/DL (ref 31–36)
MCV RBC AUTO: 92.6 FL (ref 80–100)
PDW BLD-RTO: 15.9 % (ref 12.4–15.4)
PLATELET # BLD: 245 K/UL (ref 135–450)
PMV BLD AUTO: 9.3 FL (ref 5–10.5)
POTASSIUM SERPL-SCNC: 4.7 MMOL/L (ref 3.5–5.1)
PROSTATE SPECIFIC ANTIGEN: 0.9 NG/ML (ref 0–4)
RBC # BLD: 5.17 M/UL (ref 4.2–5.9)
SODIUM BLD-SCNC: 137 MMOL/L (ref 136–145)
TOTAL PROTEIN: 6.6 G/DL (ref 6.4–8.2)
TRIGL SERPL-MCNC: 324 MG/DL (ref 0–150)
VLDLC SERPL CALC-MCNC: ABNORMAL MG/DL
WBC # BLD: 8.7 K/UL (ref 4–11)

## 2019-04-15 PROCEDURE — 4004F PT TOBACCO SCREEN RCVD TLK: CPT | Performed by: FAMILY MEDICINE

## 2019-04-15 PROCEDURE — 99214 OFFICE O/P EST MOD 30 MIN: CPT | Performed by: FAMILY MEDICINE

## 2019-04-15 PROCEDURE — G8427 DOCREV CUR MEDS BY ELIG CLIN: HCPCS | Performed by: FAMILY MEDICINE

## 2019-04-15 PROCEDURE — G8417 CALC BMI ABV UP PARAM F/U: HCPCS | Performed by: FAMILY MEDICINE

## 2019-04-15 RX ORDER — DULOXETIN HYDROCHLORIDE 60 MG/1
60 CAPSULE, DELAYED RELEASE ORAL 2 TIMES DAILY
Qty: 180 CAPSULE | Refills: 1 | Status: SHIPPED | OUTPATIENT
Start: 2019-04-15 | End: 2020-12-29 | Stop reason: SDUPTHER

## 2019-04-15 RX ORDER — CYCLOBENZAPRINE HCL 10 MG
10 TABLET ORAL 3 TIMES DAILY PRN
Qty: 90 TABLET | Refills: 2 | Status: SHIPPED | OUTPATIENT
Start: 2019-04-15 | End: 2019-07-10 | Stop reason: SDUPTHER

## 2019-04-15 RX ORDER — ATORVASTATIN CALCIUM 10 MG/1
10 TABLET, FILM COATED ORAL NIGHTLY
Qty: 30 TABLET | Refills: 5 | Status: SHIPPED | OUTPATIENT
Start: 2019-04-15 | End: 2019-08-08 | Stop reason: SDUPTHER

## 2019-04-15 RX ORDER — DIAZEPAM 10 MG/1
10 TABLET ORAL EVERY 6 HOURS PRN
Qty: 120 TABLET | Refills: 2 | Status: SHIPPED | OUTPATIENT
Start: 2019-04-15 | End: 2019-07-10 | Stop reason: SDUPTHER

## 2019-04-15 RX ORDER — SULFAMETHOXAZOLE AND TRIMETHOPRIM 800; 160 MG/1; MG/1
1 TABLET ORAL 2 TIMES DAILY
Qty: 28 TABLET | Refills: 0 | Status: SHIPPED | OUTPATIENT
Start: 2019-04-15 | End: 2019-04-29

## 2019-04-15 NOTE — PROGRESS NOTES
Subjective:      Patient ID: Beth Stanton is a 44 y.o. male. HPI   Pt is a of 44 y.o. male comes in today with   Chief Complaint   Patient presents with    Medication Refill     medications pending    Other     non specified, needs areas checked     Has been having constipation    On testosterone. Helped a little but still low energy. Valium for anxiety stable. No side effects. Notes some hesitancy with urination  Some dysuria. Past Medical History:Reviewed  Medications:Reviewed. No Known Allergies   Social hx:Reviewed. Social History     Tobacco Use   Smoking Status Current Every Day Smoker    Packs/day: 1.50    Years: 15.00    Pack years: 22.50    Types: Cigarettes   Smokeless Tobacco Never Used   Tobacco Comment    pt denies counciling     Review of Systems  No f/c  No n/v  Objective:   Physical Exam   Constitutional: He is oriented to person, place, and time. He appears well-developed and well-nourished. No distress. HENT:   Head: Normocephalic and atraumatic. Eyes: Pupils are equal, round, and reactive to light. No scleral icterus. Genitourinary: Rectal exam shows no external hemorrhoid and no tenderness. Prostate is enlarged (no nodules) and tender. Neurological: He is alert and oriented to person, place, and time. No cranial nerve deficit. Skin: Skin is warm and dry. He is not diaphoretic. Psychiatric: He has a normal mood and affect. His behavior is normal. Judgment and thought content normal.       Assessment:       Diagnosis Orders   1. Hypogonadotropic hypogonadism (HCC)  Testosterone, free, total    CBC    PSA PROSTATIC SPECIFIC ANTIGEN   2. Anxiety  diazepam (VALIUM) 10 MG tablet    CBC   3. Pure hypercholesterolemia  Comprehensive Metabolic Panel    Lipid Panel   4. Acute prostatitis  CBC    PSA PROSTATIC SPECIFIC ANTIGEN   5. Elevated PSA  PSA PROSTATIC SPECIFIC ANTIGEN          Plan:      1. Recheck to see if stable  2.  Stable on valium  Controlled Substances Monitoring: Attestation: The Prescription Monitoring Report for this patient was reviewed today. Lauro Richards MD)  Chronic Pain Routine Monitoring: Possible medication side effects, risk of tolerance/dependence & alternative treatments discussed., No signs of potential drug abuse or diversion identified: otherwise, see note documentation Lauro Richards MD)   3. Restart lipitor since not well controlled  4-5. Covering with antibiotics and rechecking.         Lauro Richards MD

## 2019-04-17 LAB
SEX HORMONE BINDING GLOBULIN: 43 NMOL/L (ref 11–80)
TESTOSTERONE FREE-NONMALE: 72.9 PG/ML (ref 47–244)
TESTOSTERONE TOTAL: 421 NG/DL (ref 220–1000)

## 2019-04-18 DIAGNOSIS — F41.9 ANXIETY: ICD-10-CM

## 2019-04-18 RX ORDER — DIAZEPAM 10 MG/1
10 TABLET ORAL EVERY 6 HOURS PRN
Qty: 120 TABLET | Refills: 1 | OUTPATIENT
Start: 2019-04-18

## 2019-04-18 RX ORDER — CYCLOBENZAPRINE HCL 10 MG
10 TABLET ORAL 3 TIMES DAILY PRN
Qty: 90 TABLET | Refills: 1 | OUTPATIENT
Start: 2019-04-18

## 2019-06-16 RX ORDER — ICOSAPENT ETHYL 1000 MG/1
2 CAPSULE ORAL 2 TIMES DAILY
Qty: 120 CAPSULE | Refills: 5 | Status: SHIPPED | OUTPATIENT
Start: 2019-06-16 | End: 2020-01-16 | Stop reason: SDUPTHER

## 2019-06-25 ENCOUNTER — HOSPITAL ENCOUNTER (OUTPATIENT)
Dept: MRI IMAGING | Age: 39
Discharge: HOME | End: 2019-06-25
Payer: MEDICARE

## 2019-06-25 DIAGNOSIS — M54.40 ACUTE RIGHT-SIDED LOW BACK PAIN WITH SCIATICA, SCIATICA LATERALITY UNSPECIFIED: ICD-10-CM

## 2019-06-25 PROCEDURE — 72148 MRI LUMBAR SPINE W/O DYE: CPT

## 2019-07-10 ENCOUNTER — OFFICE VISIT (OUTPATIENT)
Dept: FAMILY MEDICINE CLINIC | Age: 39
End: 2019-07-10
Payer: MEDICARE

## 2019-07-10 VITALS
OXYGEN SATURATION: 98 % | HEIGHT: 72 IN | HEART RATE: 105 BPM | WEIGHT: 302.2 LBS | DIASTOLIC BLOOD PRESSURE: 60 MMHG | SYSTOLIC BLOOD PRESSURE: 100 MMHG | BODY MASS INDEX: 40.93 KG/M2 | RESPIRATION RATE: 12 BRPM

## 2019-07-10 DIAGNOSIS — Z23 NEED FOR VACCINATION: ICD-10-CM

## 2019-07-10 DIAGNOSIS — M51.36 DISC DEGENERATION, LUMBAR: ICD-10-CM

## 2019-07-10 DIAGNOSIS — E23.0 HYPOGONADOTROPIC HYPOGONADISM (HCC): ICD-10-CM

## 2019-07-10 DIAGNOSIS — F41.9 ANXIETY: Primary | ICD-10-CM

## 2019-07-10 PROCEDURE — G8417 CALC BMI ABV UP PARAM F/U: HCPCS | Performed by: FAMILY MEDICINE

## 2019-07-10 PROCEDURE — 99214 OFFICE O/P EST MOD 30 MIN: CPT | Performed by: FAMILY MEDICINE

## 2019-07-10 PROCEDURE — 90471 IMMUNIZATION ADMIN: CPT | Performed by: FAMILY MEDICINE

## 2019-07-10 PROCEDURE — G8427 DOCREV CUR MEDS BY ELIG CLIN: HCPCS | Performed by: FAMILY MEDICINE

## 2019-07-10 PROCEDURE — G0009 ADMIN PNEUMOCOCCAL VACCINE: HCPCS | Performed by: FAMILY MEDICINE

## 2019-07-10 PROCEDURE — 4004F PT TOBACCO SCREEN RCVD TLK: CPT | Performed by: FAMILY MEDICINE

## 2019-07-10 PROCEDURE — 90732 PPSV23 VACC 2 YRS+ SUBQ/IM: CPT | Performed by: FAMILY MEDICINE

## 2019-07-10 PROCEDURE — 90715 TDAP VACCINE 7 YRS/> IM: CPT | Performed by: FAMILY MEDICINE

## 2019-07-10 RX ORDER — DIAZEPAM 10 MG/1
10 TABLET ORAL EVERY 6 HOURS PRN
Qty: 120 TABLET | Refills: 2 | Status: SHIPPED | OUTPATIENT
Start: 2019-07-10 | End: 2019-10-02 | Stop reason: SDUPTHER

## 2019-07-10 RX ORDER — CYCLOBENZAPRINE HCL 10 MG
10 TABLET ORAL 3 TIMES DAILY PRN
Qty: 90 TABLET | Refills: 2 | Status: SHIPPED | OUTPATIENT
Start: 2019-07-10 | End: 2019-10-08 | Stop reason: SDUPTHER

## 2019-07-10 ASSESSMENT — ENCOUNTER SYMPTOMS: SHORTNESS OF BREATH: 0

## 2019-08-08 RX ORDER — ATORVASTATIN CALCIUM 10 MG/1
TABLET, FILM COATED ORAL
Qty: 90 TABLET | Refills: 4 | Status: SHIPPED | OUTPATIENT
Start: 2019-08-08 | End: 2020-12-28 | Stop reason: SDUPTHER

## 2019-10-02 ENCOUNTER — OFFICE VISIT (OUTPATIENT)
Dept: FAMILY MEDICINE CLINIC | Age: 39
End: 2019-10-02
Payer: MEDICARE

## 2019-10-02 VITALS
SYSTOLIC BLOOD PRESSURE: 120 MMHG | DIASTOLIC BLOOD PRESSURE: 78 MMHG | HEART RATE: 101 BPM | HEIGHT: 72 IN | WEIGHT: 265.4 LBS | BODY MASS INDEX: 35.95 KG/M2 | OXYGEN SATURATION: 94 %

## 2019-10-02 DIAGNOSIS — F41.9 ANXIETY: ICD-10-CM

## 2019-10-02 DIAGNOSIS — E78.2 MIXED HYPERLIPIDEMIA: ICD-10-CM

## 2019-10-02 DIAGNOSIS — L30.9 DERMATITIS: Primary | ICD-10-CM

## 2019-10-02 DIAGNOSIS — R42 VERTIGO: ICD-10-CM

## 2019-10-02 PROCEDURE — 99214 OFFICE O/P EST MOD 30 MIN: CPT | Performed by: FAMILY MEDICINE

## 2019-10-02 PROCEDURE — G8417 CALC BMI ABV UP PARAM F/U: HCPCS | Performed by: FAMILY MEDICINE

## 2019-10-02 PROCEDURE — G8484 FLU IMMUNIZE NO ADMIN: HCPCS | Performed by: FAMILY MEDICINE

## 2019-10-02 PROCEDURE — G8427 DOCREV CUR MEDS BY ELIG CLIN: HCPCS | Performed by: FAMILY MEDICINE

## 2019-10-02 PROCEDURE — 4004F PT TOBACCO SCREEN RCVD TLK: CPT | Performed by: FAMILY MEDICINE

## 2019-10-02 RX ORDER — FLUTICASONE PROPIONATE 50 MCG
2 SPRAY, SUSPENSION (ML) NASAL DAILY
Qty: 1 BOTTLE | Refills: 5 | Status: SHIPPED | OUTPATIENT
Start: 2019-10-02 | End: 2020-02-04

## 2019-10-02 RX ORDER — TRIAMCINOLONE ACETONIDE 1 MG/G
CREAM TOPICAL
Qty: 30 G | Refills: 2 | Status: SHIPPED | OUTPATIENT
Start: 2019-10-02 | End: 2020-02-04 | Stop reason: ALTCHOICE

## 2019-10-02 RX ORDER — METHYLPREDNISOLONE 4 MG/1
TABLET ORAL
Qty: 21 TABLET | Refills: 0 | Status: SHIPPED | OUTPATIENT
Start: 2019-10-02 | End: 2019-10-08

## 2019-10-02 RX ORDER — DIAZEPAM 10 MG/1
10 TABLET ORAL EVERY 6 HOURS PRN
Qty: 120 TABLET | Refills: 2 | Status: SHIPPED | OUTPATIENT
Start: 2019-10-02 | End: 2019-12-19 | Stop reason: SDUPTHER

## 2019-10-02 ASSESSMENT — ENCOUNTER SYMPTOMS: SHORTNESS OF BREATH: 0

## 2019-10-09 RX ORDER — CYCLOBENZAPRINE HCL 10 MG
TABLET ORAL
Qty: 90 TABLET | Refills: 1 | Status: SHIPPED | OUTPATIENT
Start: 2019-10-09 | End: 2019-12-19 | Stop reason: SDUPTHER

## 2019-12-19 ENCOUNTER — OFFICE VISIT (OUTPATIENT)
Dept: FAMILY MEDICINE CLINIC | Age: 39
End: 2019-12-19
Payer: MEDICARE

## 2019-12-19 VITALS
WEIGHT: 257 LBS | OXYGEN SATURATION: 97 % | DIASTOLIC BLOOD PRESSURE: 82 MMHG | HEIGHT: 72 IN | BODY MASS INDEX: 34.81 KG/M2 | HEART RATE: 115 BPM | SYSTOLIC BLOOD PRESSURE: 110 MMHG

## 2019-12-19 DIAGNOSIS — F41.9 ANXIETY: ICD-10-CM

## 2019-12-19 DIAGNOSIS — E23.0 HYPOGONADOTROPIC HYPOGONADISM (HCC): ICD-10-CM

## 2019-12-19 DIAGNOSIS — E78.2 MIXED HYPERLIPIDEMIA: Primary | ICD-10-CM

## 2019-12-19 DIAGNOSIS — E78.2 MIXED HYPERLIPIDEMIA: ICD-10-CM

## 2019-12-19 LAB
A/G RATIO: 2.1 (ref 1.1–2.2)
ALBUMIN SERPL-MCNC: 4.5 G/DL (ref 3.4–5)
ALP BLD-CCNC: 72 U/L (ref 40–129)
ALT SERPL-CCNC: 21 U/L (ref 10–40)
ANION GAP SERPL CALCULATED.3IONS-SCNC: 19 MMOL/L (ref 3–16)
AST SERPL-CCNC: 14 U/L (ref 15–37)
BILIRUB SERPL-MCNC: 0.3 MG/DL (ref 0–1)
BUN BLDV-MCNC: 10 MG/DL (ref 7–20)
CALCIUM SERPL-MCNC: 9.6 MG/DL (ref 8.3–10.6)
CHLORIDE BLD-SCNC: 97 MMOL/L (ref 99–110)
CHOLESTEROL, TOTAL: 255 MG/DL (ref 0–199)
CO2: 21 MMOL/L (ref 21–32)
CREAT SERPL-MCNC: 1 MG/DL (ref 0.9–1.3)
GFR AFRICAN AMERICAN: >60
GFR NON-AFRICAN AMERICAN: >60
GLOBULIN: 2.1 G/DL
GLUCOSE BLD-MCNC: 84 MG/DL (ref 70–99)
HDLC SERPL-MCNC: 27 MG/DL (ref 40–60)
LDL CHOLESTEROL CALCULATED: 170 MG/DL
POTASSIUM SERPL-SCNC: 3.8 MMOL/L (ref 3.5–5.1)
SODIUM BLD-SCNC: 137 MMOL/L (ref 136–145)
TOTAL PROTEIN: 6.6 G/DL (ref 6.4–8.2)
TRIGL SERPL-MCNC: 292 MG/DL (ref 0–150)
VLDLC SERPL CALC-MCNC: 58 MG/DL

## 2019-12-19 PROCEDURE — G8484 FLU IMMUNIZE NO ADMIN: HCPCS | Performed by: FAMILY MEDICINE

## 2019-12-19 PROCEDURE — G8427 DOCREV CUR MEDS BY ELIG CLIN: HCPCS | Performed by: FAMILY MEDICINE

## 2019-12-19 PROCEDURE — 4004F PT TOBACCO SCREEN RCVD TLK: CPT | Performed by: FAMILY MEDICINE

## 2019-12-19 PROCEDURE — G8417 CALC BMI ABV UP PARAM F/U: HCPCS | Performed by: FAMILY MEDICINE

## 2019-12-19 PROCEDURE — 99214 OFFICE O/P EST MOD 30 MIN: CPT | Performed by: FAMILY MEDICINE

## 2019-12-19 RX ORDER — CYCLOBENZAPRINE HCL 10 MG
10 TABLET ORAL 3 TIMES DAILY PRN
Qty: 90 TABLET | Refills: 2 | Status: SHIPPED | OUTPATIENT
Start: 2019-12-19 | End: 2020-05-18 | Stop reason: SDUPTHER

## 2019-12-19 RX ORDER — DIAZEPAM 10 MG/1
10 TABLET ORAL EVERY 6 HOURS PRN
Qty: 120 TABLET | Refills: 2 | Status: SHIPPED | OUTPATIENT
Start: 2019-12-19 | End: 2020-04-28

## 2019-12-19 ASSESSMENT — PATIENT HEALTH QUESTIONNAIRE - PHQ9
1. LITTLE INTEREST OR PLEASURE IN DOING THINGS: 1
SUM OF ALL RESPONSES TO PHQ QUESTIONS 1-9: 2
2. FEELING DOWN, DEPRESSED OR HOPELESS: 1
SUM OF ALL RESPONSES TO PHQ QUESTIONS 1-9: 2
SUM OF ALL RESPONSES TO PHQ9 QUESTIONS 1 & 2: 2

## 2019-12-26 DIAGNOSIS — E23.0 HYPOGONADOTROPIC HYPOGONADISM (HCC): ICD-10-CM

## 2019-12-27 RX ORDER — TESTOSTERONE CYPIONATE 200 MG/ML
INJECTION INTRAMUSCULAR
Qty: 4 VIAL | Refills: 2 | Status: SHIPPED | OUTPATIENT
Start: 2019-12-27 | End: 2020-03-24

## 2020-01-16 NOTE — TELEPHONE ENCOUNTER
Last OV 12/19/2019   Next OV Visit date not found  Last [facility] 6/16/2019    Insurance requesting 90 day supply

## 2020-01-17 RX ORDER — ICOSAPENT ETHYL 1000 MG/1
2 CAPSULE ORAL 2 TIMES DAILY
Qty: 360 CAPSULE | Refills: 1 | Status: SHIPPED | OUTPATIENT
Start: 2020-01-17 | End: 2020-07-15

## 2020-02-03 ENCOUNTER — TELEPHONE (OUTPATIENT)
Dept: FAMILY MEDICINE CLINIC | Age: 40
End: 2020-02-03

## 2020-02-04 ENCOUNTER — OFFICE VISIT (OUTPATIENT)
Dept: FAMILY MEDICINE CLINIC | Age: 40
End: 2020-02-04
Payer: MEDICARE

## 2020-02-04 VITALS
HEIGHT: 72 IN | DIASTOLIC BLOOD PRESSURE: 62 MMHG | WEIGHT: 243 LBS | BODY MASS INDEX: 32.91 KG/M2 | SYSTOLIC BLOOD PRESSURE: 114 MMHG | OXYGEN SATURATION: 96 % | HEART RATE: 114 BPM

## 2020-02-04 PROCEDURE — G8417 CALC BMI ABV UP PARAM F/U: HCPCS | Performed by: NURSE PRACTITIONER

## 2020-02-04 PROCEDURE — G8484 FLU IMMUNIZE NO ADMIN: HCPCS | Performed by: NURSE PRACTITIONER

## 2020-02-04 PROCEDURE — 4004F PT TOBACCO SCREEN RCVD TLK: CPT | Performed by: NURSE PRACTITIONER

## 2020-02-04 PROCEDURE — 99214 OFFICE O/P EST MOD 30 MIN: CPT | Performed by: NURSE PRACTITIONER

## 2020-02-04 PROCEDURE — G8427 DOCREV CUR MEDS BY ELIG CLIN: HCPCS | Performed by: NURSE PRACTITIONER

## 2020-02-04 RX ORDER — DOXYCYCLINE HYCLATE 100 MG
TABLET ORAL
COMMUNITY
Start: 2020-02-03 | End: 2020-02-20 | Stop reason: ALTCHOICE

## 2020-02-04 RX ORDER — HYDROCODONE BITARTRATE AND ACETAMINOPHEN 5; 325 MG/1; MG/1
2 TABLET ORAL 2 TIMES DAILY PRN
COMMUNITY
Start: 2020-01-06 | End: 2021-01-28

## 2020-02-04 ASSESSMENT — ENCOUNTER SYMPTOMS
RHINORRHEA: 1
BACK PAIN: 1
COUGH: 1
SORE THROAT: 0

## 2020-02-04 NOTE — PROGRESS NOTES
0.5MLS INTRAMUSCULARLY ONCE WEEKLY Yes Viki Goltz, MD   cyclobenzaprine (FLEXERIL) 10 MG tablet Take 1 tablet by mouth 3 times daily as needed for Muscle spasms Yes Viki Goltz, MD   diazepam (VALIUM) 10 MG tablet Take 1 tablet by mouth every 6 hours as needed for Anxiety for up to 90 days. Ok to fill 12/26 Yes Viki Goltz, MD   atorvastatin (LIPITOR) 10 MG tablet TAKE ONE TABLET BY MOUTH ONCE NIGHTLY Yes Anya Zambrano MD   NEEDLE, DISP, 25 G (B-D DISP NEEDLE 25GX1\") 25G X 1\" MISC USE TO INJECT TESTOSTERONE ONCE WEEKLY Yes Viki Goltz, MD   DULoxetine (CYMBALTA) 60 MG extended release capsule Take 1 capsule by mouth 2 times daily Yes Viki Goltz, MD   MORPHINE, PAIN PUMP REFILL CHARGE,  Yes Historical Provider, MD   ergocalciferol (DRISDOL) 38105 UNITS capsule Take 2 capsules by mouth once a week Yes STEFFANIE Leger CNP   melatonin 3 MG TABS tablet Take 6 mg by mouth daily Yes Historical Provider, MD   Handicap Placard MISC by Does not apply route. Expires 1/10/2019. Dx Chronic back pain .5; 338.29 Yes STEFFANIE Leger CNP        Social History     Tobacco Use    Smoking status: Current Every Day Smoker     Packs/day: 1.50     Years: 15.00     Pack years: 22.50     Types: Cigarettes    Smokeless tobacco: Never Used    Tobacco comment: pt denies counciling   Substance Use Topics    Alcohol use: No     Alcohol/week: 0.0 standard drinks     Comment: Does not drink        Vitals:    02/04/20 1557   BP: 114/62   Site: Right Upper Arm   Position: Sitting   Cuff Size: Large Adult   Pulse: 114   SpO2: 96%   Weight: 243 lb (110.2 kg)   Height: 6' (1.829 m)     Estimated body mass index is 32.96 kg/m² as calculated from the following:    Height as of this encounter: 6' (1.829 m). Weight as of this encounter: 243 lb (110.2 kg). Physical Exam  Vitals signs reviewed. Constitutional:       General: He is not in acute distress. Appearance: Normal appearance. HENT:      Head: Normocephalic. Right Ear: Tympanic membrane, ear canal and external ear normal.      Left Ear: Tympanic membrane, ear canal and external ear normal.      Nose: Nose normal.      Mouth/Throat:      Lips: Pink. Mouth: Mucous membranes are moist.      Pharynx: Oropharynx is clear. Uvula midline. Cardiovascular:      Rate and Rhythm: Normal rate and regular rhythm. Pulses: Normal pulses. Heart sounds: Normal heart sounds, S1 normal and S2 normal.   Pulmonary:      Effort: Pulmonary effort is normal.      Breath sounds: Normal air entry. Examination of the left-lower field reveals rales. Rales present. Musculoskeletal:      Cervical back: He exhibits decreased range of motion. He exhibits no tenderness. Right hand: He exhibits swelling. He exhibits normal capillary refill and no deformity. Decreased strength noted. Left hand: He exhibits swelling. He exhibits normal capillary refill and no deformity. Decreased strength noted. Right foot: Normal range of motion and normal capillary refill. No tenderness or swelling. Left foot: Normal range of motion and normal capillary refill. No tenderness or swelling. Comments: No calf tenderness, no swelling present   Lymphadenopathy:      Cervical: Cervical adenopathy present. Neurological:      Mental Status: He is alert. Psychiatric:         Mood and Affect: Mood normal.         ASSESSMENT/PLAN:  1. Pneumonia of left lower lobe due to infectious organism ([address]  Stable;  Patient reports has only taken 1 full day of abx. Encouraged patient to finish as prescribed. ED recommended repeat CXR after completion of abx. Will have close follow up. Discussed ED precautions. 2. Numbness of fingers  Stable;  X-ray ordered. - XR CERVICAL SPINE (2-3 VIEWS); Future    3. Leukopenia, unspecified type  Stable;  Repeat CBC ordered. - CBC Auto Differential; Future    4.  Adenopathy  Stable;  Patient was given a referral for ENT. 5. Bilateral hand swelling  Stable;  Encouraged patient to drink more water and avoid salt intake. Patient believes it is d/t him going back to work. 6. Numbness and tingling of both feet  Stable;  Patient denies trauma, however did go back to work. Previously dx with DDD. Discussed could consider x-ray. Patient to discuss further with pain management. Follow Up:     Return in about 6 days (around 2/10/2020).

## 2020-02-07 ENCOUNTER — CARE COORDINATION (OUTPATIENT)
Dept: CARE COORDINATION | Age: 40
End: 2020-02-07

## 2020-02-07 NOTE — LETTER
2/7/2020    Diamond Diego  [address]  [place] [address]      Dear Diamond Diego,    My name is Devonte Keating. Miko Dorsey and I am a registered nurse who partners with Puja Gray MD to improve patients' health. Puja Gray MD believes you would benefit from working with me. As a member of your health care team, I would work with other providers involved in your care, offer education for your specific health conditions, and connect you with additional resources as needed. I will collaborate with Puja Gray MD to support you in following your treatment plan. The additional support I provide is no additional cost to you. My primary focus is to help you achieve specific goals and improve your health. We are committed to walk with you on this journey and look forward to working with you. Please call me to further discuss your healthcare needs. I am available by phone or for appointments at the office. You can reach me at [phone] . In good health,     Devonte Keating.  Ledy Brito

## 2020-02-12 ENCOUNTER — HOSPITAL ENCOUNTER (OUTPATIENT)
Dept: GENERAL RADIOLOGY | Age: 40
Discharge: HOME | End: 2020-02-12
Payer: MEDICARE

## 2020-02-12 ENCOUNTER — HOSPITAL ENCOUNTER (OUTPATIENT)
Age: 40
Discharge: HOME | End: 2020-02-12
Payer: MEDICARE

## 2020-02-12 DIAGNOSIS — D72.819 LEUKOPENIA, UNSPECIFIED TYPE: ICD-10-CM

## 2020-02-12 LAB
BASOPHILS ABSOLUTE: 0.1 K/UL (ref 0–0.2)
BASOPHILS RELATIVE PERCENT: 1.2 %
EOSINOPHILS ABSOLUTE: 0.3 K/UL (ref 0–0.6)
EOSINOPHILS RELATIVE PERCENT: 4.8 %
HCT VFR BLD CALC: 42.8 % (ref 40.5–52.5)
HEMOGLOBIN: 14.5 G/DL (ref 13.5–17.5)
LYMPHOCYTES ABSOLUTE: 3.1 K/UL (ref 1–5.1)
LYMPHOCYTES RELATIVE PERCENT: 47.7 %
MCH RBC QN AUTO: 28.5 PG (ref 26–34)
MCHC RBC AUTO-ENTMCNC: 33.9 G/DL (ref 31–36)
MCV RBC AUTO: 84 FL (ref 80–100)
MONOCYTES ABSOLUTE: 0.4 K/UL (ref 0–1.3)
MONOCYTES RELATIVE PERCENT: 5.7 %
NEUTROPHILS ABSOLUTE: 2.6 K/UL (ref 1.7–7.7)
NEUTROPHILS RELATIVE PERCENT: 40.6 %
PDW BLD-RTO: 13.7 % (ref 12.4–15.4)
PLATELET # BLD: 231 K/UL (ref 135–450)
PMV BLD AUTO: 9.5 FL (ref 5–10.5)
RBC # BLD: 5.09 M/UL (ref 4.2–5.9)
WBC # BLD: 6.4 K/UL (ref 4–11)

## 2020-02-12 PROCEDURE — 72040 X-RAY EXAM NECK SPINE 2-3 VW: CPT

## 2020-02-19 ENCOUNTER — CARE COORDINATION (OUTPATIENT)
Dept: CARE COORDINATION | Age: 40
End: 2020-02-19

## 2020-02-20 ENCOUNTER — CARE COORDINATION (OUTPATIENT)
Dept: CARE COORDINATION | Age: 40
End: 2020-02-20

## 2020-02-20 RX ORDER — LANOLIN ALCOHOL/MO/W.PET/CERES
1000 CREAM (GRAM) TOPICAL DAILY
COMMUNITY
End: 2021-01-28

## 2020-02-20 RX ORDER — MULTIVIT-MIN/IRON/FOLIC ACID/K 18-600-40
1 CAPSULE ORAL DAILY
COMMUNITY

## 2020-02-20 NOTE — CARE COORDINATION
Ambulatory Care Coordination Note  2/20/2020  CM Risk Score: 4  Charlson 10 Year Mortality Risk Score: 2%     ACC: Ziggy Yanes. Nikkie Masterson RN    Summary Note: Spoke to patient over the phone. He was shopping and placed me on hold until he checked out. States he has been on disability for past 10 years. 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 Patient is independent with ADLs and IADLs. He is driving and occassionally uses a cane. He lives alone. Occasionally his mother would stay with him. She was working in [employer] and drove 1.5 hours to her position. She recently retired. Mother was doing meal preparation and house keeping for patient. He says he thinks his mother will return weekly and help him with house keeping. His father is estranged with patient for past 18 years. His father has paranoid schizophrenia. Patient says he hates taking medication because he observed his father when he was a young child try to commit suicide with pills. Patient says those thoughts still linger in his mind. Patient has a morphine pain pump for his fibromyalgia pain. He says that his dose is 0.500 mg per day as he read it to me from pump screen. He gets it refilled on Monday with Dr. Emma Martínez who is his pain management physician. Patient has chronic pain from fibromyalgia. Pain is located in back, arms and legs. Patient uses norco, flexeril and valium also for pain. He experiences depression and has had good results with cymbalta. Medication review completed. Patient states he has lost 60 pounds over the past 6 months. Praise given to patient. Weight was intentional.  His current weight is 240 pounds and he would like to lose weight until he is at 215. Recorded his weight loss goal of 215#s. Offered Dietitian Kaylen Grullon to provide weight loss information. He said that he would keep that in mind but declined at this time.     Patient states he will Placard MISC by Does not apply route. Expires 1/10/2019. Dx Chronic back pain .5; 338.29 1/10/14  Yes STEFFANIE Amaro - CNP   testosterone cypionate (DEPOTESTOTERONE CYPIONATE) 200 MG/ML injection INJECT 0.5MLS INTRAMUSCULARLY ONCE WEEKLY 12/27/19 2/4/20  Leighton Epstein MD       No future appointments.    and   General Assessment    Do you have any symptoms that are causing concern?:  No

## 2020-03-02 ENCOUNTER — CARE COORDINATION (OUTPATIENT)
Dept: CARE COORDINATION | Age: 40
End: 2020-03-02

## 2020-03-02 NOTE — CARE COORDINATION
Patient said he was in his pain management physicians office. He did not have time to talk but wanted to speak to me about \"some things\". Best time to contact him is after 330 pm tomorrow. Plan  Contact patient tomorrow. Magdalena Arnold RN, BSN, [address] [place] [address] Primary Care  [phone]

## 2020-03-03 ENCOUNTER — HOSPITAL ENCOUNTER (OUTPATIENT)
Age: 40
End: 2020-03-03
Payer: MEDICARE

## 2020-03-03 ENCOUNTER — HOSPITAL ENCOUNTER (OUTPATIENT)
Age: 40
Discharge: HOME | End: 2020-03-03
Payer: MEDICARE

## 2020-03-03 ENCOUNTER — HOSPITAL ENCOUNTER (OUTPATIENT)
Dept: GENERAL RADIOLOGY | Age: 40
Discharge: HOME | End: 2020-03-03
Payer: MEDICARE

## 2020-03-03 ENCOUNTER — CARE COORDINATION (OUTPATIENT)
Dept: CARE COORDINATION | Age: 40
End: 2020-03-03

## 2020-03-03 LAB
C-REACTIVE PROTEIN: 7.2 MG/L (ref 0–5.1)
HCT VFR BLD CALC: 45.5 % (ref 40.5–52.5)
HEMOGLOBIN: 15.2 G/DL (ref 13.5–17.5)
MCH RBC QN AUTO: 28.5 PG (ref 26–34)
MCHC RBC AUTO-ENTMCNC: 33.5 G/DL (ref 31–36)
MCV RBC AUTO: 85 FL (ref 80–100)
PDW BLD-RTO: 14 % (ref 12.4–15.4)
PLATELET # BLD: 196 K/UL (ref 135–450)
PMV BLD AUTO: 9.2 FL (ref 5–10.5)
RBC # BLD: 5.35 M/UL (ref 4.2–5.9)
RHEUMATOID FACTOR: <10 IU/ML
SEDIMENTATION RATE, ERYTHROCYTE: 9 MM/HR (ref 0–15)
WBC # BLD: 8.3 K/UL (ref 4–11)

## 2020-03-03 PROCEDURE — 86038 ANTINUCLEAR ANTIBODIES: CPT

## 2020-03-03 PROCEDURE — 72070 X-RAY EXAM THORAC SPINE 2VWS: CPT

## 2020-03-03 PROCEDURE — 73560 X-RAY EXAM OF KNEE 1 OR 2: CPT

## 2020-03-03 PROCEDURE — 86431 RHEUMATOID FACTOR QUANT: CPT

## 2020-03-03 PROCEDURE — 72040 X-RAY EXAM NECK SPINE 2-3 VW: CPT

## 2020-03-03 PROCEDURE — 36415 COLL VENOUS BLD VENIPUNCTURE: CPT

## 2020-03-03 PROCEDURE — 85652 RBC SED RATE AUTOMATED: CPT

## 2020-03-03 PROCEDURE — 85027 COMPLETE CBC AUTOMATED: CPT

## 2020-03-03 PROCEDURE — 86140 C-REACTIVE PROTEIN: CPT

## 2020-03-03 SDOH — HEALTH STABILITY: MENTAL HEALTH
STRESS IS WHEN SOMEONE FEELS TENSE, NERVOUS, ANXIOUS, OR CAN'T SLEEP AT NIGHT BECAUSE THEIR MIND IS TROUBLED. HOW STRESSED ARE YOU?: TO SOME EXTENT

## 2020-03-03 SDOH — ECONOMIC STABILITY: TRANSPORTATION INSECURITY
IN THE PAST 12 MONTHS, HAS LACK OF TRANSPORTATION KEPT YOU FROM MEETINGS, WORK, OR FROM GETTING THINGS NEEDED FOR DAILY LIVING?: NO

## 2020-03-03 SDOH — ECONOMIC STABILITY: FOOD INSECURITY: WITHIN THE PAST 12 MONTHS, THE FOOD YOU BOUGHT JUST DIDN'T LAST AND YOU DIDN'T HAVE MONEY TO GET MORE.: NEVER TRUE

## 2020-03-03 SDOH — ECONOMIC STABILITY: TRANSPORTATION INSECURITY
IN THE PAST 12 MONTHS, HAS THE LACK OF TRANSPORTATION KEPT YOU FROM MEDICAL APPOINTMENTS OR FROM GETTING MEDICATIONS?: NO

## 2020-03-03 SDOH — SOCIAL STABILITY: SOCIAL NETWORK: ARE YOU MARRIED, WIDOWED, DIVORCED, SEPARATED, NEVER MARRIED, OR LIVING WITH A PARTNER?: DIVORCED

## 2020-03-03 SDOH — SOCIAL STABILITY: SOCIAL NETWORK
DO YOU BELONG TO ANY CLUBS OR ORGANIZATIONS SUCH AS CHURCH GROUPS UNIONS, FRATERNAL OR ATHLETIC GROUPS, OR SCHOOL GROUPS?: NO

## 2020-03-03 SDOH — ECONOMIC STABILITY: INCOME INSECURITY: HOW HARD IS IT FOR YOU TO PAY FOR THE VERY BASICS LIKE FOOD, HOUSING, MEDICAL CARE, AND HEATING?: NOT VERY HARD

## 2020-03-03 SDOH — SOCIAL STABILITY: SOCIAL NETWORK: HOW OFTEN DO YOU ATTEND CHURCH OR RELIGIOUS SERVICES?: MORE THAN 4 TIMES PER YEAR

## 2020-03-03 SDOH — HEALTH STABILITY: PHYSICAL HEALTH: ON AVERAGE, HOW MANY DAYS PER WEEK DO YOU ENGAGE IN MODERATE TO STRENUOUS EXERCISE (LIKE A BRISK WALK)?: 3 DAYS

## 2020-03-03 SDOH — ECONOMIC STABILITY: FOOD INSECURITY: WITHIN THE PAST 12 MONTHS, YOU WORRIED THAT YOUR FOOD WOULD RUN OUT BEFORE YOU GOT MONEY TO BUY MORE.: NEVER TRUE

## 2020-03-03 SDOH — HEALTH STABILITY: PHYSICAL HEALTH: ON AVERAGE, HOW MANY MINUTES DO YOU ENGAGE IN EXERCISE AT THIS LEVEL?: 20 MIN

## 2020-03-03 SDOH — SOCIAL STABILITY: SOCIAL NETWORK: HOW OFTEN DO YOU ATTENT MEETINGS OF THE CLUB OR ORGANIZATION YOU BELONG TO?: NEVER

## 2020-03-03 SDOH — SOCIAL STABILITY: SOCIAL NETWORK: HOW OFTEN DO YOU GET TOGETHER WITH FRIENDS OR RELATIVES?: MORE THAN THREE TIMES A WEEK

## 2020-03-03 SDOH — SOCIAL STABILITY: SOCIAL NETWORK
IN A TYPICAL WEEK, HOW MANY TIMES DO YOU TALK ON THE PHONE WITH FAMILY, FRIENDS, OR NEIGHBORS?: MORE THAN THREE TIMES A WEEK

## 2020-03-03 NOTE — CARE COORDINATION
Ambulatory Care Coordination Note  3/3/2020  CM Risk Score: 4  Charlson 10 Year Mortality Risk Score: 2%     ACC: Kika Rees. Carlene Bettencourt RN    Summary Note: Spoke to patient over the phone. He had just gotten off work. His pain management MD appointment yesterday did not change any of his pain medication. He showed his bilateral swollen hands to physician and he ordered xray and blood work. Patient had those done today. Denies pain or discomfort. Patient had my chart questions. States he cannot remember his password. Provided the help desk number to patient ([phone]). He will contact them and they will be able to help him reset his password. Patient states he finds my chart very helpful and he can see his blood work results. Denies other questions or concerns. Plan  Follow-up on lab work and xray results. DAVIDE Boyer, [address] [facility] Primary Care     [phone]          Care Coordination Interventions    Program Enrollment:  Complex Care  Referral from Primary Care Provider:  No  Suggested Interventions and Community Resources  Fall Risk Prevention: In Process  Disease Association: In Process         Goals Addressed    None         Prior to Admission medications    Medication Sig Start Date End Date Taking? Authorizing Provider   Cholecalciferol (VITAMIN D) 50 MCG (2000 UT) CAPS capsule Take 1 capsule by mouth daily    Historical Provider, MD   vitamin B-12 (CYANOCOBALAMIN) 1000 MCG tablet Take 1,000 mcg by mouth daily    Historical Provider, MD   HYDROcodone-acetaminophen (NORCO) 5-325 MG per tablet Take 2 tablets by mouth 2 times daily as needed.  1/6/20   Historical Provider, MD   Icosapent Ethyl (VASCEPA) 1 g CAPS capsule Take 2 capsules by mouth 2 times daily 1/17/20 7/15/20  Niecy Rivera MD   testosterone cypionate (DEPOTESTOTERONE CYPIONATE) 200 MG/ML injection INJECT 0.5MLS INTRAMUSCULARLY ONCE WEEKLY 12/27/19 2/4/20 Leighton Epstein MD   cyclobenzaprine (FLEXERIL) 10 MG tablet Take 1 tablet by mouth 3 times daily as needed for Muscle spasms 12/19/19   Leighotn Epstein MD   diazepam (VALIUM) 10 MG tablet Take 1 tablet by mouth every 6 hours as needed for Anxiety for up to 90 days. Ok to fill 12/26 12/19/19 3/18/20  Leighton Epstein MD   atorvastatin (LIPITOR) 10 MG tablet TAKE ONE TABLET BY MOUTH ONCE NIGHTLY 8/8/19   Neha Mcgrath MD   NEEDLE, DISP, 25 G (B-D DISP NEEDLE 25GX1\") 25G X 1\" MISC USE TO INJECT TESTOSTERONE ONCE WEEKLY 4/26/19   Leighton Epstein MD   DULoxetine (CYMBALTA) 60 MG extended release capsule Take 1 capsule by mouth 2 times daily 4/15/19   Leighton Epstein MD   MORPHINE, PAIN PUMP REFILL CHARGE,     Historical Provider, MD   melatonin 3 MG TABS tablet Take 6 mg by mouth daily    Historical Provider, MD   Handicap Placard MISC by Does not apply route. Expires 1/10/2019. Dx Chronic back pain .5; 338.29 1/10/14   STEFFANIE Amaro - CNP       No future appointments.    and   General Assessment    Do you have any symptoms that are causing concern?:  Yes  Progression since Onset:  Unchanged  Reported Symptoms:  Other (Comment: Swollen hands)

## 2020-03-04 LAB — ANTI-NUCLEAR ANTIBODY (ANA): NEGATIVE

## 2020-03-10 ENCOUNTER — CARE COORDINATION (OUTPATIENT)
Dept: CARE COORDINATION | Age: 40
End: 2020-03-10

## 2020-03-10 NOTE — CARE COORDINATION
Medication Sig Start Date End Date Taking? Authorizing Provider   Cholecalciferol (VITAMIN D) 50 MCG (2000 UT) CAPS capsule Take 1 capsule by mouth daily    Historical Provider, MD   vitamin B-12 (CYANOCOBALAMIN) 1000 MCG tablet Take 1,000 mcg by mouth daily    Historical Provider, MD   HYDROcodone-acetaminophen (NORCO) 5-325 MG per tablet Take 2 tablets by mouth 2 times daily as needed. 1/6/20   Historical Provider, MD   Icosapent Ethyl (VASCEPA) 1 g CAPS capsule Take 2 capsules by mouth 2 times daily 1/17/20 7/15/20  Sugey Leung MD   testosterone cypionate (DEPOTESTOTERONE CYPIONATE) 200 MG/ML injection INJECT 0.5MLS INTRAMUSCULARLY ONCE WEEKLY 12/27/19 2/4/20  Sugey Leung MD   cyclobenzaprine (FLEXERIL) 10 MG tablet Take 1 tablet by mouth 3 times daily as needed for Muscle spasms 12/19/19   Sugey Leung MD   diazepam (VALIUM) 10 MG tablet Take 1 tablet by mouth every 6 hours as needed for Anxiety for up to 90 days. Ok to fill 12/26 12/19/19 3/18/20  Sugey Leung MD   atorvastatin (LIPITOR) 10 MG tablet TAKE ONE TABLET BY MOUTH ONCE NIGHTLY 8/8/19   Paz Sethi MD   NEEDLE, DISP, 25 G (B-D DISP NEEDLE 25GX1\") 25G X 1\" MISC USE TO INJECT TESTOSTERONE ONCE WEEKLY 4/26/19   Sugey Leung MD   DULoxetine (CYMBALTA) 60 MG extended release capsule Take 1 capsule by mouth 2 times daily 4/15/19   Sugey Leung MD   MORPHINE, PAIN PUMP REFILL CHARGE,     Historical Provider, MD   melatonin 3 MG TABS tablet Take 6 mg by mouth daily    Historical Provider, MD   Handicap Placard MISC by Does not apply route. Expires 1/10/2019. Dx Chronic back pain .5; 338.29 1/10/14   STEFFANIE Cabrera - CNP       No future appointments.    and   General Assessment    Do you have any symptoms that are causing concern?:  Yes  Progression since Onset:  Unchanged  Reported Symptoms:  Pain (Comment: lump in throat and swollen hands)

## 2020-03-17 ENCOUNTER — CARE COORDINATION (OUTPATIENT)
Dept: CARE COORDINATION | Age: 40
End: 2020-03-17

## 2020-03-17 NOTE — CARE COORDINATION
Ambulatory Care Coordination Note  3/17/2020  CM Risk Score: 4  Charlson 10 Year Mortality Risk Score: 2%     ACC: Lauryn Gilliland. Jonathan Galloway RN    Summary Note: Spoke to patient and he was driving home from work. States his hands were now less swollen. He was 'not feeling to bad\". He said that he has not worked for 10 years and it is hard getting back into the swing of things. His body ached all over at first and now he is getting used to it. He saw his [facility] MD last Friday. He gave him some ointment to put on a q tip and place in each nostril. He started medication on Sunday. He had a mild cough, headache and feeling achy all over. He is already on My chart. Instructed him on how to do an e-visit. He denied being exposed to any one ill especially with the corona virus. He said his co-worker had gone to a wedding in [place] and was exposed to someone with it. Patient said he had not had any contact with that co-worker. Informed patient that Dr. Manju Chou had looked at his CRP level which was minimally elevated. He was not concerned about level and informed patient. He said he already knew that because his pain management MD had called and also told him that. He did not have any questions or concerns. Plan  Follow-up on patient's hand swelling  Follow-up on mild flu symptoms    Lauryn Gilliland. Elham Scott, ANIN, [address] [facility] Primary Care     [phone]        Care Coordination Interventions    Program Enrollment:  Complex Care  Referral from Primary Care Provider:  No  Suggested Interventions and Community Resources  Fall Risk Prevention: In Process  Disease Association: In Process         Goals Addressed                 This Visit's Progress     Wellness Goal   On track     Patient Self-Management Goal for Health Maintenance  Goal: I will increase my physical activity level, as follows: return to work.   I will follow a weight management program, as follows: decrease daily calories. I will follow a healthy diet as discussed by my provider. I will schedule a yearly preventative care visit. Barriers: lack of motivation  Plan for overcoming my barriers: N/A  Confidence: 7/10  Anticipated Goal Completion Date: June 1, 2020            Prior to Admission medications    Medication Sig Start Date End Date Taking? Authorizing Provider   Cholecalciferol (VITAMIN D) 50 MCG (2000 UT) CAPS capsule Take 1 capsule by mouth daily    Historical Provider, MD   vitamin B-12 (CYANOCOBALAMIN) 1000 MCG tablet Take 1,000 mcg by mouth daily    Historical Provider, MD   HYDROcodone-acetaminophen (NORCO) 5-325 MG per tablet Take 2 tablets by mouth 2 times daily as needed. 1/6/20   Historical Provider, MD   Icosapent Ethyl (VASCEPA) 1 g CAPS capsule Take 2 capsules by mouth 2 times daily 1/17/20 7/15/20  Hao Negrete MD   testosterone cypionate (DEPOTESTOTERONE CYPIONATE) 200 MG/ML injection INJECT 0.5MLS INTRAMUSCULARLY ONCE WEEKLY 12/27/19 2/4/20  Hao Negrete MD   cyclobenzaprine (FLEXERIL) 10 MG tablet Take 1 tablet by mouth 3 times daily as needed for Muscle spasms 12/19/19   Hao Negrete MD   diazepam (VALIUM) 10 MG tablet Take 1 tablet by mouth every 6 hours as needed for Anxiety for up to 90 days. Ok to fill 12/26 12/19/19 3/18/20  Hao Negerte MD   atorvastatin (LIPITOR) 10 MG tablet TAKE ONE TABLET BY MOUTH ONCE NIGHTLY 8/8/19   Lucian Duarte MD   NEEDLE, DISP, 25 G (B-D DISP NEEDLE 25GX1\") 25G X 1\" MISC USE TO INJECT TESTOSTERONE ONCE WEEKLY 4/26/19   Hao Negrete MD   DULoxetine (CYMBALTA) 60 MG extended release capsule Take 1 capsule by mouth 2 times daily 4/15/19   Hao Negrete MD   MORPHINE, PAIN PUMP REFILL CHARGE,     Historical Provider, MD   melatonin 3 MG TABS tablet Take 6 mg by mouth daily    Historical Provider, MD   Handicap Placard MISC by Does not apply route. Expires 1/10/2019.   Dx Chronic back pain .5; 338.29

## 2020-03-24 ENCOUNTER — OFFICE VISIT (OUTPATIENT)
Dept: FAMILY MEDICINE CLINIC | Age: 40
End: 2020-03-24
Payer: MEDICARE

## 2020-03-24 VITALS
OXYGEN SATURATION: 96 % | HEIGHT: 72 IN | HEART RATE: 95 BPM | BODY MASS INDEX: 31.86 KG/M2 | SYSTOLIC BLOOD PRESSURE: 124 MMHG | WEIGHT: 235.2 LBS | DIASTOLIC BLOOD PRESSURE: 82 MMHG

## 2020-03-24 PROCEDURE — G8417 CALC BMI ABV UP PARAM F/U: HCPCS | Performed by: NURSE PRACTITIONER

## 2020-03-24 PROCEDURE — G8427 DOCREV CUR MEDS BY ELIG CLIN: HCPCS | Performed by: NURSE PRACTITIONER

## 2020-03-24 PROCEDURE — G8484 FLU IMMUNIZE NO ADMIN: HCPCS | Performed by: NURSE PRACTITIONER

## 2020-03-24 PROCEDURE — G8510 SCR DEP NEG, NO PLAN REQD: HCPCS | Performed by: NURSE PRACTITIONER

## 2020-03-24 PROCEDURE — 4004F PT TOBACCO SCREEN RCVD TLK: CPT | Performed by: NURSE PRACTITIONER

## 2020-03-24 PROCEDURE — 99214 OFFICE O/P EST MOD 30 MIN: CPT | Performed by: NURSE PRACTITIONER

## 2020-03-24 RX ORDER — MELOXICAM 7.5 MG/1
TABLET ORAL
COMMUNITY
Start: 2020-03-02 | End: 2020-05-18 | Stop reason: SDUPTHER

## 2020-03-24 ASSESSMENT — PATIENT HEALTH QUESTIONNAIRE - PHQ9
SUM OF ALL RESPONSES TO PHQ9 QUESTIONS 1 & 2: 0
SUM OF ALL RESPONSES TO PHQ QUESTIONS 1-9: 0
2. FEELING DOWN, DEPRESSED OR HOPELESS: 0
SUM OF ALL RESPONSES TO PHQ QUESTIONS 1-9: 0
1. LITTLE INTEREST OR PLEASURE IN DOING THINGS: 0

## 2020-03-24 ASSESSMENT — ENCOUNTER SYMPTOMS
DIARRHEA: 0
VOMITING: 0
BACK PAIN: 1
ABDOMINAL PAIN: 1
CONSTIPATION: 0
RHINORRHEA: 1
NAUSEA: 0
BLOOD IN STOOL: 0
COUGH: 0

## 2020-03-24 NOTE — PROGRESS NOTES
Recent Travel Screening and Travel History documentation:     Travel Screening       Question Response     Do you have any of the following symptoms? Abdominal pain;Muscle pain;Joint pain (ABDOMINAL PAIN FROM HERNIA SURGERIES/MUSCLE AND JOINT PAIN FROM CHRONIC PAIN WHICH HE HAS ALL THE TIME)     In the last month, have you been in contact with someone who was confirmed or suspected to have Coronavirus / COVID-19? No / Unsure     Have you traveled internationally in the last month?  No      Travel History   Travel since 02/24/20     No documented travel since 02/24/20

## 2020-03-24 NOTE — PROGRESS NOTES
mouth 2 times daily as needed. Yes Historical Provider, MD   Icosapent Ethyl (VASCEPA) 1 g CAPS capsule Take 2 capsules by mouth 2 times daily Yes Curtis Gilliland MD   cyclobenzaprine (FLEXERIL) 10 MG tablet Take 1 tablet by mouth 3 times daily as needed for Muscle spasms Yes Curtis Gilliland MD   atorvastatin (LIPITOR) 10 MG tablet TAKE ONE TABLET BY MOUTH ONCE NIGHTLY Yes Sarah Chappell MD   NEEDLE, DISP, 25 G (B-D DISP NEEDLE 25GX1\") 25G X 1\" MISC USE TO INJECT TESTOSTERONE ONCE WEEKLY Yes Curtis Gilliland MD   DULoxetine (CYMBALTA) 60 MG extended release capsule Take 1 capsule by mouth 2 times daily Yes Curtis Gilliland MD   MORPHINE, PAIN PUMP REFILL CHARGE,  Yes Historical Provider, MD   melatonin 3 MG TABS tablet Take 6 mg by mouth daily Yes Historical Provider, MD        Social History     Tobacco Use    Smoking status: Current Every Day Smoker     Packs/day: 1.50     Years: 15.00     Pack years: 22.50     Types: Cigarettes    Smokeless tobacco: Never Used    Tobacco comment: pt denies counciling   Substance Use Topics    Alcohol use: No     Alcohol/week: 0.0 standard drinks     Comment: Does not drink        Vitals:    03/24/20 1419   BP: 124/82   Site: Left Upper Arm   Position: Sitting   Cuff Size: Large Adult   Pulse: 95   SpO2: 96%   Weight: 235 lb 3.2 oz (106.7 kg)   Height: 6' (1.829 m)     Estimated body mass index is 31.9 kg/m² as calculated from the following:    Height as of this encounter: 6' (1.829 m). Weight as of this encounter: 235 lb 3.2 oz (106.7 kg). Physical Exam  Vitals signs reviewed. Constitutional:       Appearance: Normal appearance. HENT:      Head: Normocephalic. Cardiovascular:      Rate and Rhythm: Normal rate and regular rhythm. Pulses: Normal pulses.       Heart sounds: Normal heart sounds, S1 normal and S2 normal.      Comments: Varicose veins present bilateral lower extremities; no calf tenderness  Pulmonary:      Effort: Pulmonary effort is normal.      Breath sounds: Normal breath sounds and air entry. Abdominal:      Palpations: Abdomen is soft. Tenderness: There is no abdominal tenderness. Comments: Tenderness over xyphoid process; pain pump present- no tenderness over site   Musculoskeletal:      Right lower leg: No edema. Left lower leg: No edema. Neurological:      Mental Status: He is alert. Psychiatric:         Mood and Affect: Mood normal.         ASSESSMENT/PLAN:  1. Anxiety  Stable;  Continue current regimen. 2. Other chronic pain  Stable;  Continue current regimen. 3. Localized swelling of both lower legs  Stable;  Encouraged patient to monitor his salt intake. Discussed can wear SUSSY hose during the day, take off at night time. 4. Abdominal pain, unspecified abdominal location  Stable;  None on exam.  Patient reports pain over xyphoid process- appears intact, minimal tenderness. Follow Up:     Return in about 3 months (around 6/24/2020).

## 2020-03-26 ENCOUNTER — CARE COORDINATION (OUTPATIENT)
Dept: CARE COORDINATION | Age: 40
End: 2020-03-26

## 2020-03-26 NOTE — CARE COORDINATION
Spoke to patient over the phone. While speaking he was using cell phone and it became disconnected. Unable to reach patient again over phone. Plan  Follow up on pain management    Edson Kelly.  Chico French, RN, BSN, [address] [place] [address] Primary Care  [phone]

## 2020-04-24 ENCOUNTER — CARE COORDINATION (OUTPATIENT)
Dept: CARE COORDINATION | Age: 40
End: 2020-04-24

## 2020-04-28 RX ORDER — DIAZEPAM 10 MG/1
10 TABLET ORAL EVERY 6 HOURS PRN
Qty: 120 TABLET | Refills: 1 | Status: SHIPPED | OUTPATIENT
Start: 2020-04-28 | End: 2020-07-10 | Stop reason: SDUPTHER

## 2020-04-28 NOTE — TELEPHONE ENCOUNTER
Last Fill 12/19/19  Last Office Visit 3/24/20  Return in about 3 months (around 6/24/2020).   No Pending Appointments

## 2020-05-06 ENCOUNTER — CARE COORDINATION (OUTPATIENT)
Dept: CARE COORDINATION | Age: 40
End: 2020-05-06

## 2020-05-06 NOTE — CARE COORDINATION
Ambulatory Care Coordination Note  5/6/2020  CM Risk Score: 4  Charlson 10 Year Mortality Risk Score: 2%     ACC: [place]. Radha Up RN    Summary Note: Spoke to patient over the phone. He had finished working. He says he is doing well and experiences chronic pain \"off and on\". He says he thinks pain will be with him all of his life. Patient is following the corona virus safety precautions. He is using social distancing with his coworkers. He uses a mask and hand  while at work. Patient requested that I contact Dr. Pool Valentino for him to receive a dermatologist referral.  He says his mother has given him several names and numbers. Whenever he contacts them they ask who referred him. He has \"white spots\" on his eyelids that he would like removed. Patient did not have any other questions and concerns. Plan  Route message to Dr. Pool Valentino for referral to Dermatologist.    [place]. DAVIDE Perdue, [address] [facility]     [phone]          Care Coordination Interventions    Program Enrollment:  Complex Care  Referral from Primary Care Provider:  No  Suggested Interventions and Community Resources  Fall Risk Prevention: In Process  Disease Association: In Process         Goals Addressed                 This Visit's Progress     Wellness Goal   On track     Patient Self-Management Goal for Health Maintenance  Goal: I will increase my physical activity level, as follows: return to work. I will follow a weight management program, as follows: decrease daily calories. I will follow a healthy diet as discussed by my provider. I will schedule a yearly preventative care visit. Barriers: lack of motivation  Plan for overcoming my barriers: N/A  Confidence: 7/10  Anticipated Goal Completion Date: June 1, 2020            Prior to Admission medications    Medication Sig Start Date End Date Taking?  Authorizing Provider   diazePAM (VALIUM) 10 MG tablet Take 1 tablet by mouth every 6 hours as needed for Anxiety for up to 60 days. 4/28/20 6/27/20  Tres Miguel MD   meloxicam EVELIN PABON DARLIN [facility]) 7.5 MG tablet  3/2/20   Historical Provider, MD tiff Bae) 2 % ointment  3/13/20   Historical Provider, MD   Cholecalciferol (VITAMIN D) 50 MCG (2000 UT) CAPS capsule Take 1 capsule by mouth daily    Historical Provider, MD   vitamin B-12 (CYANOCOBALAMIN) 1000 MCG tablet Take 1,000 mcg by mouth daily    Historical Provider, MD   HYDROcodone-acetaminophen (NORCO) 5-325 MG per tablet Take 2 tablets by mouth 2 times daily as needed. 1/6/20   Historical Provider, MD   Icosapent Ethyl (VASCEPA) 1 g CAPS capsule Take 2 capsules by mouth 2 times daily 1/17/20 7/15/20  Tres Miguel MD   cyclobenzaprine (FLEXERIL) 10 MG tablet Take 1 tablet by mouth 3 times daily as needed for Muscle spasms 12/19/19   Tres Miguel MD   atorvastatin (LIPITOR) 10 MG tablet TAKE ONE TABLET BY MOUTH ONCE NIGHTLY 8/8/19   Karen Smith MD   NEEDLE, DISP, 25 G (B-D DISP NEEDLE 25GX1\") 25G X 1\" MISC USE TO INJECT TESTOSTERONE ONCE WEEKLY 4/26/19   Tres Miguel MD   DULoxetine (CYMBALTA) 60 MG extended release capsule Take 1 capsule by mouth 2 times daily 4/15/19   Tres Miguel MD   MORPHINE, PAIN PUMP REFILL CHARGE,     Historical Provider, MD   melatonin 3 MG TABS tablet Take 6 mg by mouth daily    Historical Provider, MD       No future appointments.    and   General Assessment    Do you have any symptoms that are causing concern?:  No

## 2020-05-27 ENCOUNTER — CARE COORDINATION (OUTPATIENT)
Dept: CARE COORDINATION | Age: 40
End: 2020-05-27

## 2020-05-27 NOTE — CARE COORDINATION
mupirocin (BACTROBAN) 2 % ointment  3/13/20   Historical Provider, MD   Cholecalciferol (VITAMIN D) 50 MCG (2000 UT) CAPS capsule Take 1 capsule by mouth daily    Historical Provider, MD   vitamin B-12 (CYANOCOBALAMIN) 1000 MCG tablet Take 1,000 mcg by mouth daily    Historical Provider, MD   HYDROcodone-acetaminophen (NORCO) 5-325 MG per tablet Take 2 tablets by mouth 2 times daily as needed. 1/6/20   Historical Provider, MD   Icosapent Ethyl (VASCEPA) 1 g CAPS capsule Take 2 capsules by mouth 2 times daily 1/17/20 7/15/20  Joey Barry MD   atorvastatin (LIPITOR) 10 MG tablet TAKE ONE TABLET BY MOUTH ONCE NIGHTLY 8/8/19   Ariella Crockett MD   NEEDLE, DISP, 25 G (B-D DISP NEEDLE 25GX1\") 25G X 1\" MISC USE TO INJECT TESTOSTERONE ONCE WEEKLY 4/26/19   Joey Barry MD   DULoxetine (CYMBALTA) 60 MG extended release capsule Take 1 capsule by mouth 2 times daily 4/15/19   Joey Barry MD   MORPHINE, PAIN PUMP REFILL CHARGE,     Historical Provider, MD   melatonin 3 MG TABS tablet Take 6 mg by mouth daily    Historical Provider, MD       No future appointments.    and   General Assessment    Do you have any symptoms that are causing concern?:  No

## 2020-05-29 ENCOUNTER — VIRTUAL VISIT (OUTPATIENT)
Dept: FAMILY MEDICINE CLINIC | Age: 40
End: 2020-05-29
Payer: MEDICARE

## 2020-05-29 PROCEDURE — G8417 CALC BMI ABV UP PARAM F/U: HCPCS | Performed by: FAMILY MEDICINE

## 2020-05-29 PROCEDURE — 99214 OFFICE O/P EST MOD 30 MIN: CPT | Performed by: FAMILY MEDICINE

## 2020-05-29 PROCEDURE — G8427 DOCREV CUR MEDS BY ELIG CLIN: HCPCS | Performed by: FAMILY MEDICINE

## 2020-05-29 PROCEDURE — 4004F PT TOBACCO SCREEN RCVD TLK: CPT | Performed by: FAMILY MEDICINE

## 2020-05-29 RX ORDER — TESTOSTERONE CYPIONATE 200 MG/ML
200 INJECTION INTRAMUSCULAR
Qty: 2 ML | Refills: 2 | Status: SHIPPED | OUTPATIENT
Start: 2020-05-29 | End: 2020-07-10 | Stop reason: SDUPTHER

## 2020-05-29 RX ORDER — SILDENAFIL 100 MG/1
100 TABLET, FILM COATED ORAL PRN
Qty: 10 TABLET | Refills: 5 | Status: SHIPPED | OUTPATIENT
Start: 2020-05-29

## 2020-05-29 NOTE — PROGRESS NOTES
2020    TELEHEALTH EVALUATION -- Audio/Visual (During XLHVU-97 public health emergency)    HPI:    Vesna Yang (:  1980) has requested an audio/video evaluation for the following concern(s):    Chief Complaint   Patient presents with    Discuss Medications     Would like to restart testosterone therapy. Has been off testosterone for awhile. Was inconvenient to give and remember. Working more now and notices the decrease in energy. No side effects in the past.    Hasn't had bloodwork in awhile. On lipitor for hyperlipidemia     Review of Systems   Constitutional: Negative. Respiratory: Negative. Cardiovascular: Negative. Prior to Visit Medications    Medication Sig Taking? Authorizing Provider   cyclobenzaprine (FLEXERIL) 10 MG tablet Take 1 tablet by mouth 3 times daily as needed for Muscle spasms Yes Elizabeth Jc MD   diazePAM (VALIUM) 10 MG tablet Take 1 tablet by mouth every 6 hours as needed for Anxiety for up to 60 days. Yes Elizabeth Jc MD   Cholecalciferol (VITAMIN D) 50 MCG (2000 UT) CAPS capsule Take 1 capsule by mouth daily Yes Historical Provider, MD   vitamin B-12 (CYANOCOBALAMIN) 1000 MCG tablet Take 1,000 mcg by mouth daily Yes Historical Provider, MD   HYDROcodone-acetaminophen (NORCO) 5-325 MG per tablet Take 2 tablets by mouth 2 times daily as needed.  Yes Historical Provider, MD   Icosapent Ethyl (VASCEPA) 1 g CAPS capsule Take 2 capsules by mouth 2 times daily Yes Elizabeth Jc MD   atorvastatin (LIPITOR) 10 MG tablet TAKE ONE TABLET BY MOUTH ONCE NIGHTLY Yes Peter Genao MD   DULoxetine (CYMBALTA) 60 MG extended release capsule Take 1 capsule by mouth 2 times daily Yes Elizabeth Jc MD   MORPHINE, PAIN PUMP REFILL CHARGE,  Yes Historical Provider, MD   melatonin 3 MG TABS tablet Take 6 mg by mouth daily Yes Historical Provider, MD   mupirocin (BACTROBAN) 2 % ointment   Historical Provider, MD   NEEDLE DISP, 25 G (B-D DISP NEEDLE

## 2020-05-31 ASSESSMENT — ENCOUNTER SYMPTOMS: RESPIRATORY NEGATIVE: 1

## 2020-06-16 RX ORDER — TRIAMCINOLONE ACETONIDE 5 MG/G
OINTMENT TOPICAL
Qty: 15 G | Refills: 1 | Status: SHIPPED | OUTPATIENT
Start: 2020-06-16 | End: 2020-06-23

## 2020-07-10 ENCOUNTER — VIRTUAL VISIT (OUTPATIENT)
Dept: FAMILY MEDICINE CLINIC | Age: 40
End: 2020-07-10
Payer: MEDICARE

## 2020-07-10 PROCEDURE — 4004F PT TOBACCO SCREEN RCVD TLK: CPT | Performed by: FAMILY MEDICINE

## 2020-07-10 PROCEDURE — G8427 DOCREV CUR MEDS BY ELIG CLIN: HCPCS | Performed by: FAMILY MEDICINE

## 2020-07-10 PROCEDURE — G8417 CALC BMI ABV UP PARAM F/U: HCPCS | Performed by: FAMILY MEDICINE

## 2020-07-10 PROCEDURE — 99213 OFFICE O/P EST LOW 20 MIN: CPT | Performed by: FAMILY MEDICINE

## 2020-07-10 RX ORDER — TESTOSTERONE CYPIONATE 200 MG/ML
200 INJECTION INTRAMUSCULAR
Qty: 2 ML | Refills: 2 | Status: SHIPPED | OUTPATIENT
Start: 2020-07-10 | End: 2021-10-01 | Stop reason: SDUPTHER

## 2020-07-10 RX ORDER — DIAZEPAM 10 MG/1
10 TABLET ORAL EVERY 6 HOURS PRN
Qty: 120 TABLET | Refills: 2 | Status: SHIPPED | OUTPATIENT
Start: 2020-07-10 | End: 2020-10-09 | Stop reason: SDUPTHER

## 2020-07-10 NOTE — PROGRESS NOTES
capsule Take 2 capsules by mouth 2 times daily Yes Adri Hudson MD   atorvastatin (LIPITOR) 10 MG tablet TAKE ONE TABLET BY MOUTH ONCE NIGHTLY Yes Alejandro Underwood MD   NEEDLE, DISP, 25 G (B-D DISP NEEDLE 25GX1\") 25G X 1\" MISC USE TO INJECT TESTOSTERONE ONCE WEEKLY Yes Adri Hudson MD   DULoxetine (CYMBALTA) 60 MG extended release capsule Take 1 capsule by mouth 2 times daily Yes Adri Hudson MD   MORPHINE, PAIN PUMP REFILL CHARGE,  Yes Historical Provider, MD   melatonin 3 MG TABS tablet Take 6 mg by mouth daily Yes Historical Provider, MD       Social History     Tobacco Use    Smoking status: Current Every Day Smoker     Packs/day: 1.50     Years: 15.00     Pack years: 22.50     Types: Cigarettes    Smokeless tobacco: Never Used    Tobacco comment: pt denies counciling   Substance Use Topics    Alcohol use: No     Alcohol/week: 0.0 standard drinks     Comment: Does not drink    Drug use: No        No Known Allergies    PHYSICAL EXAMINATION:  [ INSTRUCTIONS:  \"[x]\" Indicates a positive item  \"[]\" Indicates a negative item  -- DELETE ALL ITEMS NOT EXAMINED]  Vital Signs: (As obtained by patient/caregiver or practitioner observation)    Blood pressure-  Heart rate-    Respiratory rate-    Temperature-  Pulse oximetry-     Constitutional: [] Appears well-developed and well-nourished [] No apparent distress      [] Abnormal-   Mental status  [] Alert and awake  [] Oriented to person/place/time []Able to follow commands      Eyes:  EOM    []  Normal  [] Abnormal-  Sclera  []  Normal  [] Abnormal -         Discharge []  None visible  [] Abnormal -    HENT:   [] Normocephalic, atraumatic.   [] Abnormal   [] Mouth/Throat: Mucous membranes are moist.     External Ears [] Normal  [] Abnormal-     Neck: [] No visualized mass     Pulmonary/Chest: [] Respiratory effort normal.  [] No visualized signs of difficulty breathing or respiratory distress        [] Abnormal-      Musculoskeletal:   [] Normal gait

## 2020-10-09 ENCOUNTER — OFFICE VISIT (OUTPATIENT)
Dept: FAMILY MEDICINE CLINIC | Age: 40
End: 2020-10-09
Payer: MEDICARE

## 2020-10-09 VITALS — BODY MASS INDEX: 33.23 KG/M2 | WEIGHT: 245 LBS

## 2020-10-09 PROBLEM — F33.1 MODERATE EPISODE OF RECURRENT MAJOR DEPRESSIVE DISORDER (HCC): Status: ACTIVE | Noted: 2020-10-09

## 2020-10-09 PROCEDURE — G8417 CALC BMI ABV UP PARAM F/U: HCPCS | Performed by: FAMILY MEDICINE

## 2020-10-09 PROCEDURE — 4004F PT TOBACCO SCREEN RCVD TLK: CPT | Performed by: FAMILY MEDICINE

## 2020-10-09 PROCEDURE — G8484 FLU IMMUNIZE NO ADMIN: HCPCS | Performed by: FAMILY MEDICINE

## 2020-10-09 PROCEDURE — 99214 OFFICE O/P EST MOD 30 MIN: CPT | Performed by: FAMILY MEDICINE

## 2020-10-09 PROCEDURE — G8427 DOCREV CUR MEDS BY ELIG CLIN: HCPCS | Performed by: FAMILY MEDICINE

## 2020-10-09 RX ORDER — DIAZEPAM 10 MG/1
10 TABLET ORAL EVERY 6 HOURS PRN
Qty: 120 TABLET | Refills: 2 | Status: SHIPPED | OUTPATIENT
Start: 2020-10-09 | End: 2021-01-04 | Stop reason: SDUPTHER

## 2020-10-11 ASSESSMENT — ENCOUNTER SYMPTOMS: RESPIRATORY NEGATIVE: 1

## 2020-12-28 DIAGNOSIS — F41.9 ANXIETY: ICD-10-CM

## 2020-12-28 NOTE — TELEPHONE ENCOUNTER
----- Message from Arline Iverson sent at 05/18/8807 11:37 AM EST -----  Subject: Refill Request    QUESTIONS  Name of Medication? DULoxetine (CYMBALTA) 60 MG extended release capsule  Patient-reported dosage and instructions? take 1 by mouth 2 times   How many days do you have left? 0  [facility]  Pharmacy phone number (if available)? [phone]  ---------------------------------------------------------------------------  --------------    Name of Medication? sertraline (ZOLOFT) 50 MG tablet  Patient-reported dosage and instructions? Take 1 tablet by mouth daily  How many days do you have left? 0  Preferred [facility]? [facility]  Pharmacy phone number (if available)? [phone]  Additional Information for Provider? Has refill available however   PHARM is requiring new script due to not having filled over 3 months   ---------------------------------------------------------------------------  --------------    Name of Medication? Icosapent Ethyl (VASCEPA) 1 g CAPS capsule  Patient-reported dosage and instructions? 2x a day by mouth   How many days do you have left? 0  Preferred [facility]? [facility]  Pharmacy phone number (if available)? [phone]  ---------------------------------------------------------------------------  --------------    Name of Medication? atorvastatin (LIPITOR) 10 MG tablet  Patient-reported dosage and instructions? 1 x day   How many days do you have left? 0  Preferred [facility]? [facility]  Pharmacy phone number (if available)? [phone]  ---------------------------------------------------------------------------  --------------    Name of Medication? diazePAM (VALIUM) 10 MG tablet  Patient-reported dosage and instructions? 4 x daily   How many days do you have left? 0  Preferred [facility]? [facility]  Pharmacy phone number (if available)?  [phone]  Additional Information for Provider? has refill available and not     however   because they have not been filled in over 3months the pharmacy is   requesting a new refills   ---------------------------------------------------------------------------  --------------  [address]  What is the best way for the office to contact you? OK to leave message on   voicemail  Preferred Call Back Phone Number?  [phone]

## 2020-12-29 RX ORDER — ICOSAPENT ETHYL 1000 MG/1
2 CAPSULE ORAL 2 TIMES DAILY
Qty: 60 CAPSULE | Refills: 3 | Status: SHIPPED | OUTPATIENT
Start: 2020-12-29 | End: 2021-01-04 | Stop reason: SDUPTHER

## 2020-12-29 RX ORDER — DIAZEPAM 10 MG/1
10 TABLET ORAL EVERY 6 HOURS PRN
Qty: 120 TABLET | Refills: 2 | OUTPATIENT
Start: 2020-12-29 | End: 2021-03-29

## 2020-12-29 RX ORDER — DULOXETIN HYDROCHLORIDE 60 MG/1
60 CAPSULE, DELAYED RELEASE ORAL 2 TIMES DAILY
Qty: 180 CAPSULE | Refills: 1 | Status: SHIPPED | OUTPATIENT
Start: 2020-12-29 | End: 2022-07-27

## 2020-12-29 RX ORDER — ATORVASTATIN CALCIUM 10 MG/1
10 TABLET, FILM COATED ORAL DAILY
Qty: 90 TABLET | Refills: 3 | Status: SHIPPED | OUTPATIENT
Start: 2020-12-29 | End: 2021-10-13

## 2021-01-04 ENCOUNTER — VIRTUAL VISIT (OUTPATIENT)
Dept: FAMILY MEDICINE CLINIC | Age: 41
End: 2021-01-04
Payer: MEDICARE

## 2021-01-04 DIAGNOSIS — F41.9 ANXIETY: ICD-10-CM

## 2021-01-04 PROCEDURE — 99212 OFFICE O/P EST SF 10 MIN: CPT | Performed by: FAMILY MEDICINE

## 2021-01-04 PROCEDURE — G8417 CALC BMI ABV UP PARAM F/U: HCPCS | Performed by: FAMILY MEDICINE

## 2021-01-04 PROCEDURE — G8427 DOCREV CUR MEDS BY ELIG CLIN: HCPCS | Performed by: FAMILY MEDICINE

## 2021-01-04 PROCEDURE — 4004F PT TOBACCO SCREEN RCVD TLK: CPT | Performed by: FAMILY MEDICINE

## 2021-01-04 PROCEDURE — G8484 FLU IMMUNIZE NO ADMIN: HCPCS | Performed by: FAMILY MEDICINE

## 2021-01-04 RX ORDER — DIAZEPAM 10 MG/1
10 TABLET ORAL EVERY 6 HOURS PRN
Qty: 120 TABLET | Refills: 2 | Status: SHIPPED | OUTPATIENT
Start: 2021-01-04 | End: 2021-03-19 | Stop reason: SDUPTHER

## 2021-01-04 RX ORDER — ICOSAPENT ETHYL 1000 MG/1
2 CAPSULE ORAL 2 TIMES DAILY
Qty: 120 CAPSULE | Refills: 3 | Status: SHIPPED | OUTPATIENT
Start: 2021-01-04 | End: 2021-10-05 | Stop reason: SDUPTHER

## 2021-01-04 NOTE — PROGRESS NOTES
Willy Aguilera (:  1980) is a 36 y.o. male,Established patient, here for evaluation of the following chief complaint(s): Medication Refill      ASSESSMENT/PLAN:  1. Anxiety  -     diazePAM (VALIUM) 10 MG tablet; Take 1 tablet by mouth every 6 hours as needed for Anxiety for up to 90 days. , Disp-120 tablet, R-2Normal  Stable on valium. Taking as prescribed. Failed 1st line meds  Controlled Substances Monitoring: Periodic Controlled Substance Monitoring: Possible medication side effects, risk of tolerance/dependence & alternative treatments discussed., No signs of potential drug abuse or diversion identified. Pawan Murdock MD)     No follow-ups on file. SUBJECTIVE/OBJECTIVE:  HPI   Pt is a of 36 y.o. male comes in today with   Chief Complaint   Patient presents with    Medication Refill     follow up anxiety    Review of Systems    Patient-Reported Vitals 2021   Patient-Reported Weight 250lb   Patient-Reported Height 5 11        Physical Exam          Willy Aguilera is a 36 y.o. male being evaluated by a Virtual Visit (video visit) encounter to address concerns as mentioned above. A caregiver was present when appropriate. Due to this being a TeleHealth encounter (During [address] emergency), evaluation of the following organ systems was limited: Vitals/Constitutional/EENT/Resp/CV/GI//MS/Neuro/Skin/Heme-Lymph-Imm. Pursuant to the emergency declaration under the [address] [place] [address], [address] [place] [address] and the [employer] Act, this Virtual Visit was conducted with patient's (and/or legal guardian's) consent, to reduce the patient's risk of exposure to COVID-19 and provide necessary medical care. The patient (and/or legal guardian) has also been advised to contact this office for worsening conditions or problems, and seek emergency medical treatment and/or call 911 if deemed necessary. Patient identification was verified at the start of the visit: Yes      Services were provided through a video synchronous discussion virtually to substitute for in-person clinic visit. Patient and provider were located at their individual homes. An electronic signature was used to authenticate this note.     --Jazmin Campo MD

## 2021-01-25 ENCOUNTER — APPOINTMENT (OUTPATIENT)
Dept: GENERAL RADIOLOGY | Age: 41
End: 2021-01-25
Payer: MEDICARE

## 2021-01-25 ENCOUNTER — HOSPITAL ENCOUNTER (EMERGENCY)
Age: 41
Discharge: HOME | End: 2021-01-25
Attending: STUDENT IN AN ORGANIZED HEALTH CARE EDUCATION/TRAINING PROGRAM
Payer: MEDICARE

## 2021-01-25 VITALS
SYSTOLIC BLOOD PRESSURE: 132 MMHG | BODY MASS INDEX: 33.91 KG/M2 | DIASTOLIC BLOOD PRESSURE: 91 MMHG | HEART RATE: 94 BPM | OXYGEN SATURATION: 97 % | WEIGHT: 250 LBS | TEMPERATURE: 97.5 F | RESPIRATION RATE: 16 BRPM

## 2021-01-25 DIAGNOSIS — M79.89 LEG SWELLING: Primary | ICD-10-CM

## 2021-01-25 LAB
ALBUMIN SERPL-MCNC: 4.5 G/DL (ref 3.4–5)
ALP BLD-CCNC: 66 U/L (ref 40–129)
ALT SERPL-CCNC: 15 U/L (ref 10–40)
ANION GAP SERPL CALCULATED.3IONS-SCNC: 8 MMOL/L (ref 3–16)
AST SERPL-CCNC: 14 U/L (ref 15–37)
BASOPHILS ABSOLUTE: 0.1 K/UL (ref 0–0.2)
BASOPHILS RELATIVE PERCENT: 1.1 %
BILIRUB SERPL-MCNC: <0.2 MG/DL (ref 0–1)
BILIRUBIN DIRECT: <0.2 MG/DL (ref 0–0.3)
BILIRUBIN, INDIRECT: ABNORMAL MG/DL (ref 0–1)
BUN BLDV-MCNC: 9 MG/DL (ref 7–20)
CALCIUM SERPL-MCNC: 9.6 MG/DL (ref 8.3–10.6)
CHLORIDE BLD-SCNC: 104 MMOL/L (ref 99–110)
CO2: 27 MMOL/L (ref 21–32)
CREAT SERPL-MCNC: 0.9 MG/DL (ref 0.9–1.3)
EOSINOPHILS ABSOLUTE: 0.2 K/UL (ref 0–0.6)
EOSINOPHILS RELATIVE PERCENT: 2.9 %
GFR AFRICAN AMERICAN: >60
GFR NON-AFRICAN AMERICAN: >60
GLUCOSE BLD-MCNC: 94 MG/DL (ref 70–99)
HCT VFR BLD CALC: 45 % (ref 40.5–52.5)
HEMOGLOBIN: 14.8 G/DL (ref 13.5–17.5)
LYMPHOCYTES ABSOLUTE: 2.2 K/UL (ref 1–5.1)
LYMPHOCYTES RELATIVE PERCENT: 37.1 %
MCH RBC QN AUTO: 30.1 PG (ref 26–34)
MCHC RBC AUTO-ENTMCNC: 33 G/DL (ref 31–36)
MCV RBC AUTO: 91.4 FL (ref 80–100)
MONOCYTES ABSOLUTE: 0.4 K/UL (ref 0–1.3)
MONOCYTES RELATIVE PERCENT: 6 %
NEUTROPHILS ABSOLUTE: 3.1 K/UL (ref 1.7–7.7)
NEUTROPHILS RELATIVE PERCENT: 52.9 %
PDW BLD-RTO: 14.7 % (ref 12.4–15.4)
PLATELET # BLD: 193 K/UL (ref 135–450)
PMV BLD AUTO: 8.9 FL (ref 5–10.5)
POTASSIUM SERPL-SCNC: 4.7 MMOL/L (ref 3.5–5.1)
PRO-BNP: 72 PG/ML (ref 0–124)
RBC # BLD: 4.93 M/UL (ref 4.2–5.9)
SODIUM BLD-SCNC: 139 MMOL/L (ref 136–145)
TOTAL PROTEIN: 6.9 G/DL (ref 6.4–8.2)
TROPONIN: <0.01 NG/ML
WBC # BLD: 5.9 K/UL (ref 4–11)

## 2021-01-25 PROCEDURE — 80076 HEPATIC FUNCTION PANEL: CPT

## 2021-01-25 PROCEDURE — 99283 EMERGENCY DEPT VISIT LOW MDM: CPT

## 2021-01-25 PROCEDURE — 93005 ELECTROCARDIOGRAM TRACING: CPT | Performed by: EMERGENCY MEDICINE

## 2021-01-25 PROCEDURE — 85025 COMPLETE CBC W/AUTO DIFF WBC: CPT

## 2021-01-25 PROCEDURE — 83880 ASSAY OF NATRIURETIC PEPTIDE: CPT

## 2021-01-25 PROCEDURE — 80048 BASIC METABOLIC PNL TOTAL CA: CPT

## 2021-01-25 PROCEDURE — 71046 X-RAY EXAM CHEST 2 VIEWS: CPT

## 2021-01-25 PROCEDURE — 84484 ASSAY OF TROPONIN QUANT: CPT

## 2021-01-25 ASSESSMENT — ENCOUNTER SYMPTOMS
DIARRHEA: 0
ABDOMINAL PAIN: 0
SHORTNESS OF BREATH: 0
COUGH: 0
NAUSEA: 0
VOMITING: 0
RHINORRHEA: 0

## 2021-01-25 NOTE — ED PROVIDER NOTES
EKG is reviewed by myself. Detailed 1110. Rate 90. Sinus rhythm. Normal EKG. No change from 4 May 2011.      Carlyle Lai MD  01/25/21 9956

## 2021-01-25 NOTE — ED PROVIDER NOTES
[address] [facility]        Pt Name: Candi Coh  MRN: [id number]  [place] 1980  Date of evaluation: 1/25/2021  Provider: pAril Cassidy PA-C  PCP: Kylah Ramirez MD     I have seen and evaluated this patient with my supervising physician Jevon Ramirez MD.    [address] [place] [address]       Chief Complaint   Patient presents with    Leg Swelling     Pt with swelling ot bilateral lower legs. PCP had ordered labs today, but pt wanted to get checked out.  +1 pitting to lower legs. SOB at times       HISTORY OF PRESENT ILLNESS   (Location, Timing/Onset, Context/Setting, Quality, Duration, Modifying Factors, Severity, Associated Signs and Symptoms)  Note limiting factors. Candi Cho is a 36 y.o. male who presents to the emergency department today for evaluation for leg swelling. The patient states that he has been experiencing leg swelling to both of his lower legs, and he states that this is actually been ongoing for over 6 months. The patient states that his symptoms have not really changed in any way, he states that they are just not going away, which prompted his visit to the ED. The patient states that his symptoms seem to be worse after he is on his feet all day, and seemed to improve when he elevates his leg. The patient has no chest pain. No shortness of breath. He has no leg pain. He has no numbness, tingling or weakness. He has no recent illnesses including fever, cough, vomiting or diarrhea. No urinary symptoms. The patient has no history of DVT or PE. No recent travels, immobilizations or surgeries. Patient otherwise has no complaints at this time. Nursing Notes were all reviewed and agreed with or any disagreements were addressed in the HPI.     REVIEW OF SYSTEMS    (2-9 systems for level 4, 10 or more for level 5)     Review of Systems   Constitutional: Negative for activity change, appetite change, chills and fever.   HENT: Negative for congestion and rhinorrhea. Respiratory: Negative for cough and shortness of breath. Cardiovascular: Positive for leg swelling. Negative for chest pain. Gastrointestinal: Negative for abdominal pain, diarrhea, nausea and vomiting. Genitourinary: Negative for difficulty urinating, dysuria and hematuria. Positives and Pertinent negatives as per HPI. Except as noted above in the ROS, all other systems were reviewed and negative. PAST MEDICAL HISTORY     Past Medical History:   Diagnosis Date    Abnormal thyroid blood test     Anxiety     Back pain     lower back pain    Depression     Elevated homocysteine     Fibromyalgia     GERD (gastroesophageal reflux disease)     Hypercholesteremia     Hypogonadotropic hypogonadism (HCC)     Neuropathy          SURGICAL HISTORY     Past Surgical History:   Procedure Laterality Date    EPIDIDYMECTOMY      left    EPIGASTRIC HERNIA REPAIR      HERNIA REPAIR  11/6/2013    LAPAROSCOPIC REPAIR RECURRENT INCISONAL HERNIA WITH MESH    KIDNEY SURGERY      ureter pinched shut on left, did not improve kidney function and left kidney was removed    NECK SURGERY      mass removed    OTHER SURGICAL HISTORY      spinal cord stimulator    NE NJX DX/THER SBST INTRLMNR CRV/THRC W/IMG GDN N/A 9/11/2018    LEFT ABDOMINAL INTRATHECAL PAIN PUMP AND CATHETER INSERTION performed by Lucile Gosselin, MD at [address]       Discharge Medication List as of 1/25/2021  5:27 PM      CONTINUE these medications which have NOT CHANGED    Details   diazePAM (VALIUM) 10 MG tablet Take 1 tablet by mouth every 6 hours as needed for Anxiety for up to 90 days. , Disp-120 tablet, R-2Normal      Icosapent Ethyl (VASCEPA) 1 g CAPS capsule Take 2 capsules by mouth 2 times daily, Disp-120 capsule, R-3Normal      sertraline (ZOLOFT) 50 MG tablet Take 1 tablet by mouth daily, Disp-30 tablet, R-5Normal      DULoxetine (CYMBALTA) 60 MG extended release capsule Take 1 capsule by mouth 2 times daily, Disp-180 capsule, R-1rushNormal      atorvastatin (LIPITOR) 10 MG tablet Take 1 tablet by mouth daily, Disp-90 tablet, R-3Normal      testosterone cypionate (DEPOTESTOTERONE CYPIONATE) 200 MG/ML injection Inject 1 mL into the muscle every 14 days for 90 days. , Disp-2 mL, R-2Normal      sildenafil (VIAGRA) 100 MG tablet Take 1 tablet by mouth as needed for Erectile Dysfunction, Disp-10 tablet, R-5Normal      Syringe/Needle, Disp, 18G X 1\" 1 ML MISC Disp-10 each, R-2, NormalDraw up testosterone with 18 gauge and inject with 21 gauge      Needle, Disp, (HYPODERMIC NEEDLE 56QK5-2/2\") 21G X 1-1/2\" MISC Disp-10 each, R-2, NormalDraw up testosterone with 18 gauge and inject with 21 gauge      cyclobenzaprine (FLEXERIL) 10 MG tablet Take 1 tablet by mouth 3 times daily as needed for Muscle spasms, Disp-90 tablet, R-2Normal      mupirocin (BACTROBAN) 2 % ointment Historical Med      vitamin B-12 (CYANOCOBALAMIN) 1000 MCG tablet Take 1,000 mcg by mouth dailyHistorical Med      HYDROcodone-acetaminophen (NORCO) 5-325 MG per tablet Take 2 tablets by mouth 2 times daily as needed. Historical Med      NEEDLE, DISP, 25 G (B-D DISP NEEDLE 25GX1\") 25G X 1\" MISC Disp-12 each, R-0, NormalUSE TO INJECT TESTOSTERONE ONCE WEEKLY      MORPHINE, PAIN PUMP REFILL CHARGE, Historical Med      melatonin 3 MG TABS tablet Take 6 mg by mouth daily      Cholecalciferol (VITAMIN D) 50 MCG (2000 UT) CAPS capsule Take 1 capsule by mouth dailyHistorical Med               ALLERGIES     Patient has no known allergies.     FAMILYHISTORY       Family History   Problem Relation Age of Onset    Thyroid Disease Mother     High Cholesterol Mother     Diabetes Father         thinks he is, never diagnosed    Schizophrenia Father         paranoid          SOCIAL HISTORY       Social History     Tobacco Use    Smoking status: Current Every Day Smoker Packs/day: 2.00     Years: 15.00     Pack years: 30.00     Types: Cigarettes    Smokeless tobacco: Never Used    Tobacco comment: pt denies counciling   Substance Use Topics    Alcohol use: No     Alcohol/week: 0.0 standard drinks     Comment: Does not drink    Drug use: No       SCREENINGS             PHYSICAL EXAM    (up to 7 for level 4, 8 or more for level 5)     ED Triage Vitals [01/25/21 1102]   BP Temp Temp Source Pulse Resp SpO2 Height Weight   (!) 132/91 97.5 °F (36.4 °C) Temporal 94 16 97 % -- 250 lb (113.4 kg)       Physical Exam  Vitals signs and nursing note reviewed. Constitutional:       Appearance: He is well-developed. He is not diaphoretic. HENT:      Head: Normocephalic and atraumatic. Right Ear: External ear normal.      Left Ear: External ear normal.      Nose: Nose normal.   Eyes:      General:         Right eye: No discharge. Left eye: No discharge. Neck:      Musculoskeletal: Normal range of motion and neck supple. Trachea: No tracheal deviation. Cardiovascular:      Rate and Rhythm: Normal rate and regular rhythm. Heart sounds: No murmur. Comments: 1+ edema to bilateral lower legs. Pulmonary:      Effort: Pulmonary effort is normal. No respiratory distress. Breath sounds: Normal breath sounds. No wheezing or rales. Abdominal:      General: Bowel sounds are normal. There is no distension. Palpations: Abdomen is soft. Tenderness: There is no abdominal tenderness. There is no guarding. Musculoskeletal: Normal range of motion. Skin:     General: Skin is warm and dry. Neurological:      Mental Status: He is alert and oriented to person, place, and time.    Psychiatric:         Behavior: Behavior normal.         DIAGNOSTIC RESULTS   LABS:    Labs Reviewed   HEPATIC FUNCTION PANEL - Abnormal; Notable for the following components:       Result Value    AST 14 (*)     All other components within normal limits    Narrative:     Performed Vitals:    Vitals:    01/25/21 1102   BP: (!) 132/91   Pulse: 94   Resp: 16   Temp: 97.5 °F (36.4 °C)   TempSrc: Temporal   SpO2: 97%   Weight: 250 lb (113.4 kg)       Patient was given the following medications:  Medications - No data to display        Briefly, this is a 20-year-old male presents the emergency department today for evaluation for leg swelling. The patient has been experiencing bilateral lower leg edema and he states that this has been ongoing for the past 6 months. Apparently he has been told to wear compression stockings but does not like them so he just does not wear them. Patient has no chest pain. No shortness of breath. On examination he does have 1+ edema to bilateral lower legs. Lungs are clear to auscultation bilaterally    EKG documented by my attending, please see her note for further details. X-ray is negative. Lab work in the ED is negative including a normal BNP, no evidence of troponin leak. Patient likely has dependent edema causing his symptoms, recommended compression stockings. He is to obtain a follow-up with his primary care physician within 2 to 3 days for reevaluation. He is to return to the ED for any new or worsening symptoms. The patient voiced understanding is agreeable with plan. Stable for discharge. My suspicion is low at this time for ACS, pneumonia, pleural effusion, pneumothorax, myocarditis, pericarditis, cardiac tamponade, life-threatening arrhythmia, TAA, acute failure, respiratory distress, acute dissection or other emergent etiology at this time. FINAL IMPRESSION      1.  Leg swelling          DISPOSITION/PLAN   DISPOSITION Decision To Discharge 01/25/2021 05:22:12 PM      PATIENT REFERREDTO:  Gabe Anderson MD  [address]  [address]  [phone]    In 3 days        DISCHARGE MEDICATIONS:  Discharge Medication List as of 1/25/2021  5:27 PM          DISCONTINUED MEDICATIONS:  Discharge Medication List as of

## 2021-01-26 ENCOUNTER — TELEPHONE (OUTPATIENT)
Dept: FAMILY MEDICINE CLINIC | Age: 41
End: 2021-01-26

## 2021-01-26 LAB
EKG ATRIAL RATE: 90 BPM
EKG DIAGNOSIS: NORMAL
EKG P AXIS: 56 DEGREES
EKG P-R INTERVAL: 184 MS
EKG Q-T INTERVAL: 350 MS
EKG QRS DURATION: 84 MS
EKG QTC CALCULATION (BAZETT): 428 MS
EKG R AXIS: 13 DEGREES
EKG T AXIS: 26 DEGREES
EKG VENTRICULAR RATE: 90 BPM

## 2021-01-26 PROCEDURE — 93010 ELECTROCARDIOGRAM REPORT: CPT | Performed by: INTERNAL MEDICINE

## 2021-01-26 NOTE — TELEPHONE ENCOUNTER
----- Message from Angelina Beltran sent at 1/26/2021  4:38 PM EST -----  Subject: Message to Provider    QUESTIONS  Information for Provider? Patient set up an appointment with Cecilia Barfield. He wants his blood work labs interpreted when he comes on Thursday. His   legs are swelling and if he needs to get in sooner please call.  ---------------------------------------------------------------------------  --------------  CALL BACK INFO  What is the best way for the office to contact you? OK to leave message on   voicemail  Preferred Call Back Phone Number? [phone]  ---------------------------------------------------------------------------  --------------  SCRIPT ANSWERS  Relationship to Patient?  Self

## 2021-01-28 ENCOUNTER — VIRTUAL VISIT (OUTPATIENT)
Dept: FAMILY MEDICINE CLINIC | Age: 41
End: 2021-01-28
Payer: MEDICARE

## 2021-01-28 DIAGNOSIS — R53.83 FATIGUE, UNSPECIFIED TYPE: ICD-10-CM

## 2021-01-28 DIAGNOSIS — E78.2 MIXED HYPERLIPIDEMIA: ICD-10-CM

## 2021-01-28 DIAGNOSIS — R07.89 CHEST PRESSURE: ICD-10-CM

## 2021-01-28 DIAGNOSIS — R22.43 LOCALIZED SWELLING OF BOTH LOWER LEGS: Primary | ICD-10-CM

## 2021-01-28 PROCEDURE — 99214 OFFICE O/P EST MOD 30 MIN: CPT | Performed by: NURSE PRACTITIONER

## 2021-01-28 PROCEDURE — G8417 CALC BMI ABV UP PARAM F/U: HCPCS | Performed by: NURSE PRACTITIONER

## 2021-01-28 PROCEDURE — 4004F PT TOBACCO SCREEN RCVD TLK: CPT | Performed by: NURSE PRACTITIONER

## 2021-01-28 PROCEDURE — G8484 FLU IMMUNIZE NO ADMIN: HCPCS | Performed by: NURSE PRACTITIONER

## 2021-01-28 PROCEDURE — G8427 DOCREV CUR MEDS BY ELIG CLIN: HCPCS | Performed by: NURSE PRACTITIONER

## 2021-01-28 RX ORDER — HYDROCODONE BITARTRATE AND ACETAMINOPHEN 7.5; 325 MG/1; MG/1
TABLET ORAL
COMMUNITY
Start: 2021-01-05

## 2021-01-28 NOTE — PROGRESS NOTES
Kurt Julian (:  1980) is a 36 y.o. male,Established patient, here for evaluation of the following chief complaint(s): Follow-Up from Hospital      ASSESSMENT/PLAN:  1. Localized swelling of both lower legs  Stable; Will r/o blood clot with swelling and cramping. Patient has not been taking testosterone. Discussed likely from varicose veins. Encouraged patient to watch salt intake, elevate and use compression stockings. BNP negative and kidney panel normal in the ER.    -     VL Extremity Venous Bilateral; Future  2. Chest pressure  Stable;  EKG SR in the ER. Stress test ordered. ER precautions discussed. -     CARDIAC STRESS TEST EXERCISE ONLY; Future  3. Mixed hyperlipidemia  Stable;  Labs ordered. -     Lipid Panel; Future  4. Fatigue, unspecified type  Stable;  CBC normal in the ER. Labs ordered. -     TSH without Reflex; Future  -     Vitamin B12; Future      No follow-ups on file. SUBJECTIVE/OBJECTIVE:  HPI   Has had since summer  Legs are swollen up to knee- bilaterally, equally  Has discomfort and pain  Really bad fantasma horse R calf- then faded, the next morning couldn't walk on it  No SOB; can have chest pressure  Has varicose veins  Takes testosterone- has not taken x 2 months  Got compression stockings today  Does not watch salt intake- eats unhealthy; adds salt  No FH DVT    Has not been taking B12 supplement    Review of Systems    Patient-Reported Vitals 2021   Patient-Reported Weight 250 lb   Patient-Reported Height 6'        Physical Exam  Vitals signs reviewed. Constitutional:       Appearance: Normal appearance. HENT:      Head: Normocephalic. Right Ear: External ear normal.      Left Ear: External ear normal.   Pulmonary:      Effort: No respiratory distress. Musculoskeletal:      Right lower leg: Edema present. Left lower leg: Edema present. Skin:     Comments: Multiple varicose and spider veins   Neurological:      Mental Status: He is alert. Psychiatric:         Mood and Affect: Mood normal.           Brendan Gill is a 36 y.o. male being evaluated by a Virtual Visit (video visit) encounter to address concerns as mentioned above. A caregiver was present when appropriate. Due to this being a TeleHealth encounter (During [facility] public health emergency), evaluation of the following organ systems was limited: Vitals/Constitutional/EENT/Resp/CV/GI//MS/Neuro/Skin/Heme-Lymph-Imm. Pursuant to the emergency declaration under the [address] [place] [address], [address] [place] [address] authority and the [employer] Act, this Virtual Visit was conducted with patient's (and/or legal guardian's) consent, to reduce the patient's risk of exposure to COVID-19 and provide necessary medical care. The patient (and/or legal guardian) has also been advised to contact this office for worsening conditions or problems, and seek emergency medical treatment and/or call 911 if deemed necessary. Patient identification was verified at the start of the visit: Yes    Services were provided through a video synchronous discussion virtually to substitute for in-person clinic visit. Patient was located at home and provider was located in office or at home. An electronic signature was used to authenticate this note.     --Raleigh Doherty, STEFFANIE - CNP

## 2021-02-09 ENCOUNTER — HOSPITAL ENCOUNTER (OUTPATIENT)
Dept: VASCULAR LAB | Age: 41
Discharge: HOME | End: 2021-02-09
Payer: MEDICARE

## 2021-02-09 DIAGNOSIS — R22.43 LOCALIZED SWELLING OF BOTH LOWER LEGS: ICD-10-CM

## 2021-02-09 PROCEDURE — 93970 EXTREMITY STUDY: CPT

## 2021-02-19 ENCOUNTER — HOSPITAL ENCOUNTER (OUTPATIENT)
Dept: NON INVASIVE DIAGNOSTICS | Age: 41
End: 2021-02-19
Payer: MEDICARE

## 2021-03-19 ENCOUNTER — VIRTUAL VISIT (OUTPATIENT)
Dept: FAMILY MEDICINE CLINIC | Age: 41
End: 2021-03-19
Payer: MEDICARE

## 2021-03-19 DIAGNOSIS — F41.9 ANXIETY: ICD-10-CM

## 2021-03-19 DIAGNOSIS — M79.89 LEG SWELLING: Primary | ICD-10-CM

## 2021-03-19 DIAGNOSIS — R07.89 CHEST PRESSURE: ICD-10-CM

## 2021-03-19 PROCEDURE — 4004F PT TOBACCO SCREEN RCVD TLK: CPT | Performed by: FAMILY MEDICINE

## 2021-03-19 PROCEDURE — 99214 OFFICE O/P EST MOD 30 MIN: CPT | Performed by: FAMILY MEDICINE

## 2021-03-19 PROCEDURE — G8484 FLU IMMUNIZE NO ADMIN: HCPCS | Performed by: FAMILY MEDICINE

## 2021-03-19 PROCEDURE — G8427 DOCREV CUR MEDS BY ELIG CLIN: HCPCS | Performed by: FAMILY MEDICINE

## 2021-03-19 PROCEDURE — G8417 CALC BMI ABV UP PARAM F/U: HCPCS | Performed by: FAMILY MEDICINE

## 2021-03-19 RX ORDER — DIAZEPAM 10 MG/1
10 TABLET ORAL EVERY 6 HOURS PRN
Qty: 120 TABLET | Refills: 2 | Status: SHIPPED | OUTPATIENT
Start: 2021-03-19 | End: 2021-07-12 | Stop reason: SDUPTHER

## 2021-03-19 NOTE — PROGRESS NOTES
Vinod Oliver (:  1980) is a 36 y.o. male,Established patient, here for evaluation of the following chief complaint(s): Medication Refill (needs refill on valium)      ASSESSMENT/PLAN:  1. Leg swelling  -     Echocardiogram complete; Future  Elevation, compression  2. Chest pressure  -     Echocardiogram complete; Future  Echo to eavluate  3. Anxiety  -     diazePAM (VALIUM) 10 MG tablet; Take 1 tablet by mouth every 6 hours as needed for Anxiety for up to 90 days. , Disp-120 tablet, R-2Normal  Stable on valium  Controlled Substances Monitoring: Periodic Controlled Substance Monitoring: Possible medication side effects, risk of tolerance/dependence & alternative treatments discussed., No signs of potential drug abuse or diversion identified. Renetta Graham MD)     No follow-ups on file. SUBJECTIVE/OBJECTIVE:  HPI   Pt is a of 36 y.o. male comes in today with   Chief Complaint   Patient presents with    Medication Refill     needs refill on valium     Chest pressure at the end of the day. No symptoms with exertion. In the er ekg and bloodwork were normal.  Notes leg swelling. Anxiety stable on valium    Review of Systems    Patient-Reported Vitals 3/19/2021   Patient-Reported Weight 240lb   Patient-Reported Height 6        Physical Exam              Vinod Oliver, was evaluated through a synchronous (real-time) audio-video encounter. The patient (or guardian if applicable) is aware that this is a billable service. Verbal consent to proceed has been obtained within the past 12 months. The visit was conducted pursuant to the emergency declaration under the [address] [place] [address], [address] [place] [address] authority and the [employer] General Act. Patient identification was verified, and a caregiver was present when appropriate. The patient was located in a state where the provider was credentialed to provide care.       An electronic signature was used to authenticate this note.     --Girish Kerr MD

## 2021-04-27 ENCOUNTER — CARE COORDINATION (OUTPATIENT)
Dept: CARE COORDINATION | Age: 41
End: 2021-04-27

## 2021-04-27 NOTE — CARE COORDINATION
Ambulatory Care Coordination Note  4/27/2021  CM Risk Score: 4  Charlson 10 Year Mortality Risk Score: 2%     ACC: Edilberto Gill. Racquel Churchill, SHELBI    Summary Note: Patient returned phone call. Provided CM information and offered re enrollment. Patient is independent with ADLS and IADLs. 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 He lives alone in an apartment. His mother assists him with housekeeping and meal preparation. He says she sometimes grocery shops for him. He is planning on moving to another apartment. He says he is glad and now he will be able to grill his food. Patient says he eats out a lot. He mostly eats a small meal in am and bigger meal in afternoon. He says he lost weight on purpose. He was down to 225#s but has gained it back. He is currently 250#s. Patient would like to lose weight again. Discussed healthy eating habits and foods lower in sodium. He went to refrigerator and read what the sodium content is when he uses frozen meals. He was surprised when I informed him that total sodium intake daily should be 2300 mg or less. He said he would be more conscientious now. Patient has hx of fibromyalgia. He has a morphine pain pump which provides relief. He has chronic pain in back, legs and arms. He also has chronic anxiety which he manages with valium. Patient says recently his lower extremities have begun swelling. He is getting an echocardiogram next Monday. He wants to make sure he does not have any heart problems. Encouraged patient to elevate his legs above the level of his heart. He says he wears compression stockings. Mostly he stays in recliner in the evening. Provided patient with my contact information. Offered completing medication review but he said tomorrow would be a better time. Agreed to phone call around 12 noon.     Plan  Enroll into CM  Obtain approval from Dr. Noé Sanchez  Complete SDOH, FR, CC protocol and med review  FU on bilateral leg swelling  FU on chronic pain  Contact patient tomorrow    Archie Pope. DAVIDE Ricketts, [address] [facility] Primary Care     [phone]       Ambulatory Care Coordination Assessment    Care Coordination Protocol  Program Enrollment: Complex Care  Referral from Primary Care Provider: No  Week 1 - Initial Assessment     Do you have all of your prescriptions and are they filled?: Yes  Barriers to medication adherence: Complexity of regimen  Are you able to afford your medications?: Yes  How often do you have trouble taking your medications the way you have been told to take them?: Sometimes I take them as prescribed. Do you have Home O2 Therapy?: No      Ability to seek help/take action for Emergent Urgent situations i.e. fire, crime, inclement weather or health crisis. : Independent  Ability to ambulate to restroom: Independent  Ability handle personal hygeine needs (bathing/dressing/grooming): Independent  Ability to manage Medications: Independent  Ability to prepare Food Preparation: Needs Assistance  Ability to maintain home (clean home, laundry): Needs Assistance  Ability to drive and/or has transportation: Independent  Ability to do shopping: Independent  Ability to manage finances: Independent  Is patient able to live independently?: Yes     Current Housing: Apartment           Frequent urination at night?: No  Do you use rails/bars?: No  Do you have a non-slip tub mat?: Yes     Are you experiencing loss of meaning?: No  Are you experiencing loss of hope and peace?: No     Suggested Interventions and Community Resources                  Prior to Admission medications    Medication Sig Start Date End Date Taking? Authorizing Provider   diazePAM (VALIUM) 10 MG tablet Take 1 tablet by mouth every 6 hours as needed for Anxiety for up to 90 days.  3/19/21 6/17/21  Malvin Epstein MD   HYDROcodone-acetaminophen [facility]) 7.5-325 MG per tablet  1/5/21   Historical Provider, MD   Icosapent Ethyl (VASCEPA) 1 g CAPS capsule Take 2 capsules by mouth 2 times daily 1/4/21   Lalita Barragan MD   sertraline (ZOLOFT) 50 MG tablet Take 1 tablet by mouth daily 12/29/20   Lalita Barragan MD   DULoxetine (CYMBALTA) 60 MG extended release capsule Take 1 capsule by mouth 2 times daily 12/29/20   Lalita Barragan MD   atorvastatin (LIPITOR) 10 MG tablet Take 1 tablet by mouth daily 12/29/20   Lalita Barragan MD   testosterone cypionate (DEPOTESTOTERONE CYPIONATE) 200 MG/ML injection Inject 1 mL into the muscle every 14 days for 90 days.  7/10/20 1/28/21  Lalita Barragan MD   sildenafil (VIAGRA) 100 MG tablet Take 1 tablet by mouth as needed for Erectile Dysfunction  Patient not taking: Reported on 3/19/2021 5/29/20   Lalita Barragan MD   Syringe/Needle, Disp, 18G X 1\" 1 ML MISC Draw up testosterone with 18 gauge and inject with 21 gauge 5/29/20   Lalita Barragan MD   Needle Disp, (HYPODERMIC NEEDLE 01RM0-8/2\") 21G X 1-1/2\" MISC Draw up testosterone with 18 gauge and inject with 21 gauge 5/29/20   Lalita Barragan MD   cyclobenzaprine (FLEXERIL) 10 MG tablet Take 1 tablet by mouth 3 times daily as needed for Muscle spasms 5/18/20   Lalita Barragan MD   Cholecalciferol (VITAMIN D) 50 MCG (2000 UT) CAPS capsule Take 1 capsule by mouth daily    Historical Provider, MD   NEEDLE, DISP, 25 G (B-D DISP NEEDLE 25GX1\") 25G X 1\" MISC USE TO INJECT TESTOSTERONE ONCE WEEKLY 4/26/19   Lalita Barragan MD   MORPHINE, PAIN PUMP REFILL CHARGE,     Historical Provider, MD   melatonin 3 MG TABS tablet Take 6 mg by mouth daily    Historical Provider, MD       Future Appointments   Date Time Provider Pamela Alvarado   5/3/2021  2:30 PM SCHEDULE, [facility] RM 2 [facility] HOD   6/21/2021  2:40 PM Lalita Barragan MD [facility]      and   General Assessment    Do you have any symptoms that are causing concern?: Yes  Progression since Onset: Unchanged  Reported Symptoms: Other (Comment:

## 2021-04-27 NOTE — CARE COORDINATION
Mailed to patient:  Low sodium diet information. Jonas South, RN, BSN, [address] [place] [address] Care  [phone]

## 2021-04-28 ENCOUNTER — CARE COORDINATION (OUTPATIENT)
Dept: CARE COORDINATION | Age: 41
End: 2021-04-28

## 2021-04-28 NOTE — CARE COORDINATION
Phone call placed to patient for requested time to complete CM enrollment. Patient stated that he had been on web site all morning trying to get something done. He said now is not a good time. He said to contact him at another time. Plan  Complete CM enrollment    Yodit Zhao.  Lorraine Calzada RN, BSN, [address] [place] [address] Primary Care  [phone]

## 2021-05-03 ENCOUNTER — HOSPITAL ENCOUNTER (OUTPATIENT)
Dept: NON INVASIVE DIAGNOSTICS | Age: 41
Discharge: HOME | End: 2021-05-03
Payer: MEDICARE

## 2021-05-03 DIAGNOSIS — R07.89 CHEST PRESSURE: ICD-10-CM

## 2021-05-03 DIAGNOSIS — M79.89 LEG SWELLING: ICD-10-CM

## 2021-05-03 LAB
LV EF: 58 %
LVEF MODALITY: NORMAL

## 2021-05-03 PROCEDURE — 93306 TTE W/DOPPLER COMPLETE: CPT

## 2021-05-05 RX ORDER — FUROSEMIDE 20 MG/1
20 TABLET ORAL 2 TIMES DAILY
Qty: 60 TABLET | Refills: 1 | Status: SHIPPED | OUTPATIENT
Start: 2021-05-05 | End: 2022-07-27 | Stop reason: SDUPTHER

## 2021-05-13 ENCOUNTER — CARE COORDINATION (OUTPATIENT)
Dept: CARE COORDINATION | Age: 41
End: 2021-05-13

## 2021-05-14 ENCOUNTER — CARE COORDINATION (OUTPATIENT)
Dept: CARE COORDINATION | Age: 41
End: 2021-05-14

## 2021-05-14 RX ORDER — CYCLOBENZAPRINE HCL 10 MG
10 TABLET ORAL 3 TIMES DAILY PRN
Qty: 90 TABLET | Refills: 2 | Status: SHIPPED | OUTPATIENT
Start: 2021-05-14 | End: 2022-09-26

## 2021-05-14 NOTE — CARE COORDINATION
Ambulatory Care Coordination Note  5/14/2021  CM Risk Score: 4  Charlson 10 Year Mortality Risk Score: 2%     ACC: Todd Edward. Zach Lott RN    Summary Note: Spoke with patient over the phone. Reviewed his medication and updated his list.  He said he was doing \"well\". Denied complaints of pain or discomfort at present. He said his morphine pump had now been in for 3 years. He says it will need to be changed in 5 or 6 years. Patient requests a flexeril refill. Patient recently moved from apartment to home. He says he now has a roommate. Provided my contact information. Patient did not have any questions or concerns. Plan  Completed FR, CC protocol, med review and SDOH  FU on chronic pain  FU on bilateral leg swelling    Ronald Lott RN, BSN, [address] [place] [address] Primary Care  [phone]          Care Coordination Interventions    Program Enrollment: Complex Care  Referral from Primary Care Provider: No  Suggested Interventions and Community Resources  Smoking Cessation: Not Started         Goals Addressed                 This Visit's Progress     Wellness Goal   On track     Patient Self-Management Goal for Health Maintenance  Goal: I will follow a healthy diet as discussed by my provider. I will schedule a yearly preventative care visit. I will schedule screening colonoscopies as directed by my provider. I will consider obtaining vaccines recommended by my provider. Barriers: lack of motivation  Plan for overcoming my barriers: Working with Madalyn Sanabria  Confidence: 5/10  Anticipated Goal Completion Date: June 2021            Prior to Admission medications    Medication Sig Start Date End Date Taking? Authorizing Provider   furosemide (LASIX) 20 MG tablet Take 1 tablet by mouth 2 times daily 5/5/21  Yes Luis Laguerre MD   diazePAM (VALIUM) 10 MG tablet Take 1 tablet by mouth every 6 hours as needed for Anxiety for up to 90 days.  3/19/21 6/17/21 Yes Savannah Mendiola MD   HYDROcodone-acetaminophen [facility]) 7.5-325 MG per tablet  1/5/21  Yes Historical Provider, MD   Icosapent Ethyl (VASCEPA) 1 g CAPS capsule Take 2 capsules by mouth 2 times daily 1/4/21  Yes Savannah Mendiola MD   sertraline (ZOLOFT) 50 MG tablet Take 1 tablet by mouth daily 12/29/20  Yes Savannah Mendiola MD   DULoxetine (CYMBALTA) 60 MG extended release capsule Take 1 capsule by mouth 2 times daily 12/29/20  Yes Savannah Mendiola MD   atorvastatin (LIPITOR) 10 MG tablet Take 1 tablet by mouth daily 12/29/20  Yes Savannah Mendiola MD   Syringe/Needle, Disp, 18G X 1\" 1 ML MISC Draw up testosterone with 18 gauge and inject with 21 gauge 5/29/20  Yes Savannah Mendiola MD   Needle, Disp, (HYPODERMIC NEEDLE 82TQ9-4/2\") 21G X 1-1/2\" [facility] Draw up testosterone with 18 gauge and inject with 21 gauge 5/29/20  Yes Savannah Mendiola MD   cyclobenzaprine (FLEXERIL) 10 MG tablet Take 1 tablet by mouth 3 times daily as needed for Muscle spasms 5/18/20  Yes Savannah Mendiola MD   Cholecalciferol (VITAMIN D) 50 MCG (2000 UT) CAPS capsule Take 1 capsule by mouth daily   Yes Historical Provider, MD   NEEDLE DISP, 25 G (B-D DISP NEEDLE 25GX1\") 25G X 1\" MISC USE TO INJECT TESTOSTERONE ONCE WEEKLY 4/26/19  Yes Savannah Mendiola MD   MORPHINE, PAIN PUMP REFILL CHARGE,    Yes Historical Provider, MD   melatonin 3 MG TABS tablet Take 6 mg by mouth daily   Yes Historical Provider, MD   testosterone cypionate (DEPOTESTOTERONE CYPIONATE) 200 MG/ML injection Inject 1 mL into the muscle every 14 days for 90 days.  7/10/20 1/28/21  Savannah Mendiola MD   sildenafil (VIAGRA) 100 MG tablet Take 1 tablet by mouth as needed for Erectile Dysfunction  Patient not taking: Reported on 3/19/2021 5/29/20   Savannah Mendiola MD       Future Appointments   Date Time Provider Pamela Alvarado   6/21/2021  2:40 PM [facility]      and   General Assessment    Do you have any symptoms that are causing concern?: No

## 2021-05-26 ENCOUNTER — CARE COORDINATION (OUTPATIENT)
Dept: CARE COORDINATION | Age: 41
End: 2021-05-26

## 2021-05-26 NOTE — CARE COORDINATION
Phone call placed to reach patient for CM FU and he was at work. States best time to contact him is around 2 pm or later. He says his chronic pain currently is manageable. Plan  FU on chronic pain    Travis Cardoso.  Jacque Ireland RN, BSN, [address] [place] [address] Primary Care  [phone]

## 2021-06-04 ENCOUNTER — CARE COORDINATION (OUTPATIENT)
Dept: CARE COORDINATION | Age: 41
End: 2021-06-04

## 2021-06-04 NOTE — CARE COORDINATION
Phone call placed to patient for the purpose of CM FU and he was not available. LM and provided contact information. Plan  FU on chronic pain    Harris Orozco.  Meena Haji, RN, BSN, [address] [place] [address]  [phone]

## 2021-06-23 ENCOUNTER — CARE COORDINATION (OUTPATIENT)
Dept: CARE COORDINATION | Age: 41
End: 2021-06-23

## 2021-07-12 ENCOUNTER — VIRTUAL VISIT (OUTPATIENT)
Dept: FAMILY MEDICINE CLINIC | Age: 41
End: 2021-07-12
Payer: MEDICARE

## 2021-07-12 ENCOUNTER — CARE COORDINATION (OUTPATIENT)
Dept: CARE COORDINATION | Age: 41
End: 2021-07-12

## 2021-07-12 DIAGNOSIS — F33.1 MODERATE EPISODE OF RECURRENT MAJOR DEPRESSIVE DISORDER (HCC): ICD-10-CM

## 2021-07-12 DIAGNOSIS — I10 BENIGN HYPERTENSION: Primary | ICD-10-CM

## 2021-07-12 DIAGNOSIS — F41.9 ANXIETY: ICD-10-CM

## 2021-07-12 DIAGNOSIS — E23.0 HYPOGONADOTROPIC HYPOGONADISM (HCC): ICD-10-CM

## 2021-07-12 PROCEDURE — 4004F PT TOBACCO SCREEN RCVD TLK: CPT | Performed by: FAMILY MEDICINE

## 2021-07-12 PROCEDURE — G8427 DOCREV CUR MEDS BY ELIG CLIN: HCPCS | Performed by: FAMILY MEDICINE

## 2021-07-12 PROCEDURE — G8417 CALC BMI ABV UP PARAM F/U: HCPCS | Performed by: FAMILY MEDICINE

## 2021-07-12 PROCEDURE — 99214 OFFICE O/P EST MOD 30 MIN: CPT | Performed by: FAMILY MEDICINE

## 2021-07-12 RX ORDER — LISINOPRIL 20 MG/1
20 TABLET ORAL DAILY
Qty: 30 TABLET | Refills: 5 | Status: SHIPPED | OUTPATIENT
Start: 2021-07-12 | End: 2021-10-12 | Stop reason: SDUPTHER

## 2021-07-12 RX ORDER — DIAZEPAM 10 MG/1
10 TABLET ORAL EVERY 6 HOURS PRN
Qty: 120 TABLET | Refills: 2 | Status: SHIPPED | OUTPATIENT
Start: 2021-07-12 | End: 2021-11-15

## 2021-07-12 SDOH — ECONOMIC STABILITY: FOOD INSECURITY: WITHIN THE PAST 12 MONTHS, THE FOOD YOU BOUGHT JUST DIDN'T LAST AND YOU DIDN'T HAVE MONEY TO GET MORE.: NEVER TRUE

## 2021-07-12 SDOH — ECONOMIC STABILITY: FOOD INSECURITY: WITHIN THE PAST 12 MONTHS, YOU WORRIED THAT YOUR FOOD WOULD RUN OUT BEFORE YOU GOT MONEY TO BUY MORE.: NEVER TRUE

## 2021-07-12 ASSESSMENT — SOCIAL DETERMINANTS OF HEALTH (SDOH): HOW HARD IS IT FOR YOU TO PAY FOR THE VERY BASICS LIKE FOOD, HOUSING, MEDICAL CARE, AND HEATING?: NOT HARD AT ALL

## 2021-07-12 NOTE — CARE COORDINATION
Ambulatory Care Coordination Note  7/12/2021  CM Risk Score: 4  Charlson 10 Year Mortality Risk Score: 2%     ACC: Josie Bundy. Chhaya Ware RN    Summary Note: Spoke to the patient over the phone. He stated that he had a virtual visit with his PCP Dr. Geo Robison today. He said his chronic back pain is the same. He has been applying intermittent heating pad with some relief of pain. He denied other pain or discomfort. He said he has been doing \"ok\" recently. Provided graduating from  information and patient was agreeable. He has my contact information for any questions or concerns. Plan  Obtain approval from Dr. Nidia Jean Baptiste from [address] [place] [address],[address]. Chhaya Ware RN, BSN, [address] [place] [address] Primary Care     [phone]          Care Coordination Interventions    Program Enrollment: Complex Care  Referral from Primary Care Provider: No  Suggested Interventions and Community Resources  Smoking Cessation: Not Started         Goals Addressed                 This Visit's Progress     COMPLETED: Wellness Goal   On track     Patient Self-Management Goal for Health Maintenance  Goal: I will follow a healthy diet as discussed by my provider. I will schedule a yearly preventative care visit. I will schedule screening colonoscopies as directed by my provider. I will consider obtaining vaccines recommended by my provider. Barriers: lack of motivation  Plan for overcoming my barriers: Working with Alondra Anders  Confidence: 5/10  Anticipated Goal Completion Date: June 2021            Prior to Admission medications    Medication Sig Start Date End Date Taking? Authorizing Provider   diazePAM (VALIUM) 10 MG tablet Take 1 tablet by mouth every 6 hours as needed for Anxiety for up to 90 days.  7/12/21 10/10/21  Iman Sorto MD   lisinopril (PRINIVIL;ZESTRIL) 20 MG tablet Take 1 tablet by mouth daily 7/12/21   Iman Sorto MD   cyclobenzaprine (FLEXERIL) 10 MG tablet Take 1 tablet by mouth 3 times daily as needed for Muscle spasms 5/14/21   Bipin Briceño MD   furosemide (LASIX) 20 MG tablet Take 1 tablet by mouth 2 times daily 5/5/21   Bipin Briceño MD   HYDROcodone-acetaminophen [facility]) 7.5-325 MG per tablet  1/5/21   Historical Provider, MD   Icosapent Ethyl (VASCEPA) 1 g CAPS capsule Take 2 capsules by mouth 2 times daily 1/4/21   Bipin Briceño MD   sertraline (ZOLOFT) 50 MG tablet Take 1 tablet by mouth daily 12/29/20   Bipin Briceño MD   DULoxetine (CYMBALTA) 60 MG extended release capsule Take 1 capsule by mouth 2 times daily 12/29/20   Bipin Briceño MD   atorvastatin (LIPITOR) 10 MG tablet Take 1 tablet by mouth daily 12/29/20   Bipin Briceño MD   testosterone cypionate (DEPOTESTOTERONE CYPIONATE) 200 MG/ML injection Inject 1 mL into the muscle every 14 days for 90 days. Patient not taking: Reported on 7/12/2021 7/10/20 1/28/21  Bipin Briceño MD   sildenafil (VIAGRA) 100 MG tablet Take 1 tablet by mouth as needed for Erectile Dysfunction 5/29/20   Bipin Briceño MD   Syringe/Needle, Disp, 18G X 1\" 1 ML MISC Draw up testosterone with 18 gauge and inject with 21 gauge 5/29/20   Bipin Briceño MD   Needle Disp, (HYPODERMIC NEEDLE 46EO6-4/2\") 21G X 1-1/2\" MISC Draw up testosterone with 18 gauge and inject with 21 gauge 5/29/20   Bipin Briceño MD   Cholecalciferol (VITAMIN D) 50 MCG (2000 UT) CAPS capsule Take 1 capsule by mouth daily  Patient not taking: Reported on 7/12/2021    Historical Provider, MD   NEEDLE DISP, 25 G (B-D DISP NEEDLE 25GX1\") 25G X 1\" MISC USE TO INJECT TESTOSTERONE ONCE WEEKLY 4/26/19   Bipin Briceño MD   MORPHINE, PAIN PUMP REFILL CHARGE,     Historical Provider, MD   melatonin 3 MG TABS tablet Take 6 mg by mouth daily    Historical Provider, MD       No future appointments.    and   General Assessment    Do you have any symptoms that are causing concern?: Yes  Progression since Onset: Unchanged  Reported Symptoms: Pain (Comment: chronic back pain)

## 2021-07-12 NOTE — PROGRESS NOTES
John Ponce (:  1980) is a 39 y.o. male,Established patient, here for evaluation of the following chief complaint(s):  Medication Refill and Other (\"I will talk to the doctor\")         ASSESSMENT/PLAN:  Maria Guadalupe Austin was seen today for medication refill and other. Diagnoses and all orders for this visit:    Benign hypertension  -     Lipid Panel; Future  -     Comprehensive Metabolic Panel; Future  -     CBC; Future  -     TSH with Reflex; Future  The current medical regimen is effective;  continue present plan and medications. Due for bloodwork   Moderate episode of recurrent major depressive disorder ([facility] [address]  The current medical regimen is effective;  continue present plan and medications. Hypogonadotropic hypogonadism (HCC)  -     Testosterone, free, total; Future  Has been off t  More symptoms so rechecking  Anxiety  -     diazePAM (VALIUM) 10 MG tablet; Take 1 tablet by mouth every 6 hours as needed for Anxiety for up to 90 days. Stable on valium  Other orders  -     lisinopril (PRINIVIL;ZESTRIL) 20 MG tablet; Take 1 tablet by mouth daily       Controlled Substances Monitoring: Periodic Controlled Substance Monitoring: Possible medication side effects, risk of tolerance/dependence & alternative treatments discussed., No signs of potential drug abuse or diversion identified. Austin Foreman MD)   No follow-ups on file. Subjective   SUBJECTIVE/OBJECTIVE:  HPI   Pt is a of 39 y.o. male comes in today with   Chief Complaint   Patient presents with    Medication Refill    Other     \"I will talk to the doctor\"     blood pressure has been running higher. Review of Systems       Objective   Physical Exam         An electronic signature was used to authenticate this note.     --Austin Foreman MD

## 2021-07-14 NOTE — PROGRESS NOTES
Eligio Pickens (:  1980) is a 39 y.o. male,Established patient, here for evaluation of the following chief complaint(s): Medication Refill and Other (\"I will talk to the doctor\")         ASSESSMENT/PLAN:  Isha Smith was seen today for medication refill and other. Diagnoses and all orders for this visit:    Benign hypertension  -     Lipid Panel; Future  -     Comprehensive Metabolic Panel; Future  -     CBC; Future  -     TSH with Reflex; Future  Not well controlled. Adding lisinopril. Medication side affects and adverse reactions reviewed, including angioedema and cough. Due for bloodwork   Moderate episode of recurrent major depressive disorder ([facility] [address]  The current medical regimen is effective;  continue present plan and medications. Hypogonadotropic hypogonadism (HCC)  -     Testosterone, free, total; Future  Has been off t  More symptoms so rechecking  Anxiety  -     diazePAM (VALIUM) 10 MG tablet; Take 1 tablet by mouth every 6 hours as needed for Anxiety for up to 90 days. Stable on valium  Other orders  -     lisinopril (PRINIVIL;ZESTRIL) 20 MG tablet; Take 1 tablet by mouth daily       Controlled Substances Monitoring: Periodic Controlled Substance Monitoring: Possible medication side effects, risk of tolerance/dependence & alternative treatments discussed., No signs of potential drug abuse or diversion identified. Charles Dowling MD)   No follow-ups on file. No follow-ups on file. SUBJECTIVE/OBJECTIVE:  HPI   Pt is a of 39 y.o. male comes in today with   Chief Complaint   Patient presents with    Medication Refill    Other     \"I will talk to the doctor\"      follow up valium  blood pressure running higher. Review of Systems    Patient-Reported Vitals 2021   Patient-Reported Weight 240lb   Patient-Reported Height 5' 11\"        Physical Exam              Eligio Pickens, was evaluated through a synchronous (real-time) audio-video encounter.  The patient (or guardian if applicable) is aware that this is a billable service. Verbal consent to proceed has been obtained within the past 12 months. The visit was conducted pursuant to the emergency declaration under the [address] [place] [address] authority and the [employer] General Act. Patient identification was verified, and a caregiver was present when appropriate. The patient was located in a state where the provider was credentialed to provide care. An electronic signature was used to authenticate this note.     --Elizabeth Patel MD

## 2021-09-25 ENCOUNTER — HOSPITAL ENCOUNTER (OUTPATIENT)
Age: 41
Discharge: HOME | End: 2021-09-25
Payer: MEDICARE

## 2021-09-25 DIAGNOSIS — I10 BENIGN HYPERTENSION: ICD-10-CM

## 2021-09-25 DIAGNOSIS — E23.0 HYPOGONADOTROPIC HYPOGONADISM (HCC): ICD-10-CM

## 2021-09-25 LAB
A/G RATIO: 1.8 (ref 1.1–2.2)
ALBUMIN SERPL-MCNC: 4.4 G/DL (ref 3.4–5)
ALP BLD-CCNC: 60 U/L (ref 40–129)
ALT SERPL-CCNC: 12 U/L (ref 10–40)
ANION GAP SERPL CALCULATED.3IONS-SCNC: 11 MMOL/L (ref 3–16)
AST SERPL-CCNC: 13 U/L (ref 15–37)
BILIRUB SERPL-MCNC: 0.3 MG/DL (ref 0–1)
BUN BLDV-MCNC: 9 MG/DL (ref 7–20)
CALCIUM SERPL-MCNC: 9.4 MG/DL (ref 8.3–10.6)
CHLORIDE BLD-SCNC: 104 MMOL/L (ref 99–110)
CHOLESTEROL, TOTAL: 216 MG/DL (ref 0–199)
CO2: 27 MMOL/L (ref 21–32)
CREAT SERPL-MCNC: 1 MG/DL (ref 0.9–1.3)
GFR AFRICAN AMERICAN: >60
GFR NON-AFRICAN AMERICAN: >60
GLOBULIN: 2.5 G/DL
GLUCOSE BLD-MCNC: 85 MG/DL (ref 70–99)
HCT VFR BLD CALC: 46 % (ref 40.5–52.5)
HDLC SERPL-MCNC: 23 MG/DL (ref 40–60)
HEMOGLOBIN: 15.2 G/DL (ref 13.5–17.5)
LDL CHOLESTEROL CALCULATED: 138 MG/DL
MCH RBC QN AUTO: 29.1 PG (ref 26–34)
MCHC RBC AUTO-ENTMCNC: 33.1 G/DL (ref 31–36)
MCV RBC AUTO: 88.1 FL (ref 80–100)
PDW BLD-RTO: 14.4 % (ref 12.4–15.4)
PLATELET # BLD: 203 K/UL (ref 135–450)
PMV BLD AUTO: 9.9 FL (ref 5–10.5)
POTASSIUM SERPL-SCNC: 5 MMOL/L (ref 3.5–5.1)
RBC # BLD: 5.22 M/UL (ref 4.2–5.9)
SODIUM BLD-SCNC: 142 MMOL/L (ref 136–145)
TOTAL PROTEIN: 6.9 G/DL (ref 6.4–8.2)
TRIGL SERPL-MCNC: 275 MG/DL (ref 0–150)
TSH REFLEX: 0.5 UIU/ML (ref 0.27–4.2)
VLDLC SERPL CALC-MCNC: 55 MG/DL
WBC # BLD: 8 K/UL (ref 4–11)

## 2021-09-25 PROCEDURE — 85027 COMPLETE CBC AUTOMATED: CPT

## 2021-09-25 PROCEDURE — 84403 ASSAY OF TOTAL TESTOSTERONE: CPT

## 2021-09-25 PROCEDURE — 36415 COLL VENOUS BLD VENIPUNCTURE: CPT

## 2021-09-25 PROCEDURE — 84270 ASSAY OF SEX HORMONE GLOBUL: CPT

## 2021-09-25 PROCEDURE — 84443 ASSAY THYROID STIM HORMONE: CPT

## 2021-09-25 PROCEDURE — 80061 LIPID PANEL: CPT

## 2021-09-25 PROCEDURE — 80053 COMPREHEN METABOLIC PANEL: CPT

## 2021-09-28 LAB
SEX HORMONE BINDING GLOBULIN: 30 NMOL/L (ref 11–80)
TESTOSTERONE FREE-NONMALE: 24.7 PG/ML (ref 47–244)
TESTOSTERONE TOTAL: 126 NG/DL (ref 220–1000)

## 2021-09-29 DIAGNOSIS — E23.0 HYPOGONADOTROPIC HYPOGONADISM (HCC): ICD-10-CM

## 2021-09-29 NOTE — TELEPHONE ENCOUNTER
Medication:   Requested Prescriptions     Pending Prescriptions Disp Refills    Needle, Disp, (HYPODERMIC NEEDLE 55KU3-3/2\") 21G X 1-/2\" MISC 10 each 2     Sig: Draw up testosterone with 18 gauge and inject with 21 gauge    testosterone cypionate (DEPOTESTOTERONE CYPIONATE) 200 MG/ML injection 2 mL 2     Sig: Inject 1 mL into the muscle every 14 days for 90 days.  Needles & Syringes MISC 100 each 3     Si each by Does not apply route daily        Last appt: 2021   Next appt: NONE  Last OARRS:   RX Monitoring 2021   Attestation -   Periodic Controlled Substance Monitoring Possible medication side effects, risk of tolerance/dependence & alternative treatments discussed. ;No signs of potential drug abuse or diversion identified.

## 2021-09-29 NOTE — TELEPHONE ENCOUNTER
testosterone cypionate (DEPOTESTOTERONE CYPIONATE) 200 MG/ML injection ()  Syringe/Needle, Disp, 18G X 1\" 1 ML MISC  Needle, Disp, (HYPODERMIC NEEDLE 56OT7-9/2\") 21G X 1-1/2\" [address] [facility]

## 2021-10-01 ENCOUNTER — TELEPHONE (OUTPATIENT)
Dept: FAMILY MEDICINE CLINIC | Age: 41
End: 2021-10-01

## 2021-10-01 RX ORDER — TESTOSTERONE CYPIONATE 200 MG/ML
200 INJECTION INTRAMUSCULAR
Qty: 2 ML | Refills: 2 | Status: SHIPPED | OUTPATIENT
Start: 2021-10-01 | End: 2021-10-13 | Stop reason: SDUPTHER

## 2021-10-01 NOTE — TELEPHONE ENCOUNTER
Pt stated he would like a call back. Stated he needs to schedule a visit and talk about bloodwork.      Please advise pt     Phone # [phone]

## 2021-10-04 NOTE — TELEPHONE ENCOUNTER
Sorry-I though he was going to review at next follow up for his usual meds (due in oct)  T low  Cholesterol high.  In this range statin recommended

## 2021-10-05 RX ORDER — ICOSAPENT ETHYL 1000 MG/1
2 CAPSULE ORAL 2 TIMES DAILY
Qty: 120 CAPSULE | Refills: 11 | Status: SHIPPED | OUTPATIENT
Start: 2021-10-05 | End: 2022-07-15 | Stop reason: SDUPTHER

## 2021-10-05 NOTE — TELEPHONE ENCOUNTER
Patient informed/scheduled. Patient states he has vascepa but he does not take regularly, requesting refill.   Last Fill 1/4/21  Last Office Visit 7/12/21  Pending Appointments 10/13/21

## 2021-10-12 RX ORDER — LISINOPRIL 20 MG/1
20 TABLET ORAL DAILY
Qty: 90 TABLET | Refills: 0 | Status: SHIPPED | OUTPATIENT
Start: 2021-10-12 | End: 2022-09-26

## 2021-10-13 ENCOUNTER — OFFICE VISIT (OUTPATIENT)
Dept: FAMILY MEDICINE CLINIC | Age: 41
End: 2021-10-13
Payer: MEDICARE

## 2021-10-13 VITALS
HEART RATE: 101 BPM | OXYGEN SATURATION: 96 % | BODY MASS INDEX: 36.7 KG/M2 | HEIGHT: 72 IN | SYSTOLIC BLOOD PRESSURE: 120 MMHG | DIASTOLIC BLOOD PRESSURE: 74 MMHG | WEIGHT: 271 LBS

## 2021-10-13 DIAGNOSIS — G47.10 PERSISTENT HYPERSOMNIA: Primary | ICD-10-CM

## 2021-10-13 DIAGNOSIS — E78.2 MIXED HYPERLIPIDEMIA: ICD-10-CM

## 2021-10-13 DIAGNOSIS — F41.9 ANXIETY: ICD-10-CM

## 2021-10-13 DIAGNOSIS — E23.0 HYPOGONADOTROPIC HYPOGONADISM (HCC): ICD-10-CM

## 2021-10-13 PROCEDURE — G8427 DOCREV CUR MEDS BY ELIG CLIN: HCPCS | Performed by: FAMILY MEDICINE

## 2021-10-13 PROCEDURE — G8484 FLU IMMUNIZE NO ADMIN: HCPCS | Performed by: FAMILY MEDICINE

## 2021-10-13 PROCEDURE — 4004F PT TOBACCO SCREEN RCVD TLK: CPT | Performed by: FAMILY MEDICINE

## 2021-10-13 PROCEDURE — G8417 CALC BMI ABV UP PARAM F/U: HCPCS | Performed by: FAMILY MEDICINE

## 2021-10-13 PROCEDURE — 99214 OFFICE O/P EST MOD 30 MIN: CPT | Performed by: FAMILY MEDICINE

## 2021-10-13 RX ORDER — ROSUVASTATIN CALCIUM 5 MG/1
5 TABLET, COATED ORAL DAILY
Qty: 90 TABLET | Refills: 1 | Status: SHIPPED | OUTPATIENT
Start: 2021-10-13 | End: 2022-07-27 | Stop reason: SDUPTHER

## 2021-10-13 RX ORDER — TESTOSTERONE CYPIONATE 200 MG/ML
200 INJECTION INTRAMUSCULAR
Qty: 2 ML | Refills: 5 | Status: SHIPPED | OUTPATIENT
Start: 2021-10-13 | End: 2022-02-23 | Stop reason: SDUPTHER

## 2021-10-13 NOTE — PROGRESS NOTES
Roland Whiting (:  1980) is a 39 y.o. male,Established patient, here for evaluation of the following chief complaint(s):  3 Month Follow-Up and Medication Check         ASSESSMENT/PLAN:  Shantell Ramirez was seen today for 3 month follow-up and medication check. Diagnoses and all orders for this visit:    Persistent hypersomnia  -     Cancel: Kalyani Orellana MD, Sleep Medicine, Bina Oropeza MD, Pulmonary, [facility]  Not controlled. High risk for avril so referred for evaluation  Hypogonadotropic hypogonadism (HCC)  -     testosterone cypionate (DEPOTESTOTERONE CYPIONATE) 200 MG/ML injection; Inject 1 mL into the muscle every 14 days for 180 days. Not well controlled but just restarted supplementation. Will recheck in 3 months  Anxiety  Stable on valium. Will rf when needed. Taking as directed  Controlled Substances Monitoring: Periodic Controlled Substance Monitoring: Possible medication side effects, risk of tolerance/dependence & alternative treatments discussed., No signs of potential drug abuse or diversion identified. Hunter Smith MD)   Mixed hyperlipidemia  Not well controlled. Hesitant to take statin. Will try crestor  Other orders  -     rosuvastatin (CRESTOR) 5 MG tablet; Take 1 tablet by mouth daily         No follow-ups on file. Subjective   SUBJECTIVE/OBJECTIVE:  HPI   Pt is a of 39 y.o. male comes in today with   Chief Complaint   Patient presents with    3 Month Follow-Up    Medication Check     Not feeling as well recently. More sluggish. Legs still swelling. ultrasound was negative  Has not been taking lipitor regularly. Bilateral leg numbness. MRI in 2019 showed bilateral L5 neural foraminal narrowing. Last week was 1st dose of IM testo in 6 months  Didn't start lasix since was worried about dehydration.     Vitals:    10/13/21 1440   BP: 120/74   Site: Left Upper Arm   Position: Sitting   Cuff Size: Medium Adult   Pulse: 101 SpO2: 96%   Weight: 271 lb (122.9 kg)   Height: 6' (1.829 m)        Review of Systems       Objective   Physical Exam         An electronic signature was used to authenticate this note.     --Joyice Meigs, MD

## 2021-10-26 DIAGNOSIS — E23.0 HYPOGONADOTROPIC HYPOGONADISM (HCC): ICD-10-CM

## 2021-10-26 NOTE — TELEPHONE ENCOUNTER
Pt messed up on his prescription and is requesting 1 emergency refill till he can get his whole prescription filled next week     Please advise pt     testosterone cypionate (DEPOTESTOTERONE CYPIONATE) 200 MG/ML injection    [address]

## 2021-11-01 RX ORDER — TESTOSTERONE CYPIONATE 200 MG/ML
200 INJECTION INTRAMUSCULAR
Qty: 2 ML | Refills: 5 | OUTPATIENT
Start: 2021-11-01 | End: 2022-04-30

## 2021-12-17 ENCOUNTER — OFFICE VISIT (OUTPATIENT)
Dept: FAMILY MEDICINE CLINIC | Age: 41
End: 2021-12-17
Payer: MEDICARE

## 2021-12-17 VITALS
WEIGHT: 269.2 LBS | TEMPERATURE: 98.1 F | SYSTOLIC BLOOD PRESSURE: 110 MMHG | DIASTOLIC BLOOD PRESSURE: 76 MMHG | RESPIRATION RATE: 16 BRPM | HEART RATE: 95 BPM | BODY MASS INDEX: 36.51 KG/M2 | OXYGEN SATURATION: 97 %

## 2021-12-17 DIAGNOSIS — E23.0 HYPOGONADOTROPIC HYPOGONADISM (HCC): Primary | ICD-10-CM

## 2021-12-17 DIAGNOSIS — F41.9 ANXIETY: ICD-10-CM

## 2021-12-17 DIAGNOSIS — E66.01 SEVERE OBESITY (BMI 35.0-35.9 WITH COMORBIDITY) (HCC): ICD-10-CM

## 2021-12-17 DIAGNOSIS — I10 BENIGN HYPERTENSION: ICD-10-CM

## 2021-12-17 PROCEDURE — G8417 CALC BMI ABV UP PARAM F/U: HCPCS | Performed by: FAMILY MEDICINE

## 2021-12-17 PROCEDURE — G8427 DOCREV CUR MEDS BY ELIG CLIN: HCPCS | Performed by: FAMILY MEDICINE

## 2021-12-17 PROCEDURE — G8484 FLU IMMUNIZE NO ADMIN: HCPCS | Performed by: FAMILY MEDICINE

## 2021-12-17 PROCEDURE — 4004F PT TOBACCO SCREEN RCVD TLK: CPT | Performed by: FAMILY MEDICINE

## 2021-12-17 PROCEDURE — 99214 OFFICE O/P EST MOD 30 MIN: CPT | Performed by: FAMILY MEDICINE

## 2021-12-17 RX ORDER — FLUTICASONE PROPIONATE 50 MCG
2 SPRAY, SUSPENSION (ML) NASAL DAILY
Qty: 1 EACH | Refills: 2 | Status: SHIPPED | OUTPATIENT
Start: 2021-12-17 | End: 2022-09-26

## 2021-12-17 RX ORDER — DIAZEPAM 10 MG/1
TABLET ORAL
Qty: 120 TABLET | Refills: 2 | Status: SHIPPED | OUTPATIENT
Start: 2021-12-17 | End: 2022-04-04 | Stop reason: SDUPTHER

## 2021-12-17 NOTE — PROGRESS NOTES
Angel Ibarra (:  1980) is a 39 y.o. male,Established patient, here for evaluation of the following chief complaint(s):  Medication Check         ASSESSMENT/PLAN:  Renetta Cochran was seen today for medication check. Diagnoses and all orders for this visit:    Hypogonadotropic hypogonadism ([facility]  -     CBC; Future  -     Testosterone, free, total; Future  Symptoms improved  Off for a few weeks will likely be low  Anxiety  -     diazePAM (VALIUM) 10 MG tablet; TAKE ONE TABLET BY MOUTH EVERY 6 HOURS AS NEEDED FOR ANXIETY  Stable on valium  Controlled Substances Monitoring: Periodic Controlled Substance Monitoring: Possible medication side effects, risk of tolerance/dependence & alternative treatments discussed., No signs of potential drug abuse or diversion identified. Buster Xavier MD)   Severe obesity (BMI 35.0-35.9 with comorbidity) ([facility]  Stable with diet  Benign hypertension  -     CBC; Future  The current medical regimen is effective;  continue present plan and medications. Other orders  -     fluticasone (FLONASE) 50 MCG/ACT nasal spray; 2 sprays by Each Nostril route daily         No follow-ups on file. Subjective   SUBJECTIVE/OBJECTIVE:  HPI   Pt is a of 39 y.o. male comes in today with   Chief Complaint   Patient presents with    Medication Check     Energy much better back on T.  1 ml every 2 weeks  Last shot 3 weeks ago. Anxiety stable on valium. Vitals:    21 0913   BP: 110/76   Pulse: 95   Resp: 16   Temp: 98.1 °F (36.7 °C)   TempSrc: Tympanic   SpO2: 97%   Weight: 269 lb 3.2 oz (122.1 kg)      Review of Systems       Objective   Physical Exam         An electronic signature was used to authenticate this note.     --Buster Xavier MD

## 2021-12-21 ENCOUNTER — NURSE TRIAGE (OUTPATIENT)
Dept: OTHER | Facility: CLINIC | Age: 41
End: 2021-12-21

## 2021-12-21 NOTE — TELEPHONE ENCOUNTER
Received call from Maurice Gramajo at [facility] with Red Flag Complaint. Current Symptoms:   Pt reports shortness of breath, nasal congestion and sinus pressure. He is experiencing shortness of breath and mild wheezing when he is active. Onset: 1 week ago; worsening    Associated Symptoms: NA    Pain Severity: 7/10; \"sinus pressure\"; intermittent    Temperature: Denies fever    What has been tried: Flonase, cough drops, Ibuprofen     Recommended disposition: See a provider within the next 4 hours. Pt states that he went to an Urgent Care today. He was told that the wait was 3 hours so he left. He would like to be seen tomorrow. Care advice provided, patient verbalizes understanding; denies any other questions or concerns; instructed to call back for any new or worsening symptoms. Writer provided warm [facility] for appointment scheduling     Attention Provider: Thank you for allowing me to participate in the care of your patient. The patient was connected to triage in response to information provided to the ECC/PSC. Please do not respond through this encounter as the response is not directed to a shared pool.     Reason for Disposition   [1] MILD difficulty breathing (e.g., minimal/no SOB at rest, SOB with walking, pulse <100) AND [2] NEW-onset or WORSE than normal    Protocols used: BREATHING DIFFICULTY-ADULT-AH

## 2021-12-22 ENCOUNTER — VIRTUAL VISIT (OUTPATIENT)
Dept: FAMILY MEDICINE CLINIC | Age: 41
End: 2021-12-22
Payer: MEDICARE

## 2021-12-22 DIAGNOSIS — B96.89 ACUTE BACTERIAL SINUSITIS: Primary | ICD-10-CM

## 2021-12-22 DIAGNOSIS — J01.90 ACUTE BACTERIAL SINUSITIS: Primary | ICD-10-CM

## 2021-12-22 PROCEDURE — 4004F PT TOBACCO SCREEN RCVD TLK: CPT | Performed by: NURSE PRACTITIONER

## 2021-12-22 PROCEDURE — G8484 FLU IMMUNIZE NO ADMIN: HCPCS | Performed by: NURSE PRACTITIONER

## 2021-12-22 PROCEDURE — G8427 DOCREV CUR MEDS BY ELIG CLIN: HCPCS | Performed by: NURSE PRACTITIONER

## 2021-12-22 PROCEDURE — 99213 OFFICE O/P EST LOW 20 MIN: CPT | Performed by: NURSE PRACTITIONER

## 2021-12-22 PROCEDURE — G8417 CALC BMI ABV UP PARAM F/U: HCPCS | Performed by: NURSE PRACTITIONER

## 2021-12-22 RX ORDER — AMOXICILLIN AND CLAVULANATE POTASSIUM 875; 125 MG/1; MG/1
1 TABLET, FILM COATED ORAL 2 TIMES DAILY
Qty: 20 TABLET | Refills: 0 | Status: SHIPPED | OUTPATIENT
Start: 2021-12-22 | End: 2022-07-27 | Stop reason: SDUPTHER

## 2021-12-22 ASSESSMENT — ENCOUNTER SYMPTOMS
EYE DISCHARGE: 0
NAUSEA: 0
EYE REDNESS: 0
WHEEZING: 0
RHINORRHEA: 1
SORE THROAT: 0
EYE PAIN: 0
SINUS PAIN: 1
STRIDOR: 0
TROUBLE SWALLOWING: 0
BLOOD IN STOOL: 0
SHORTNESS OF BREATH: 0
SINUS PRESSURE: 1
CHOKING: 0
ABDOMINAL PAIN: 0
CONSTIPATION: 0
COUGH: 0
VOMITING: 0
DIARRHEA: 0
PHOTOPHOBIA: 0
BACK PAIN: 0
VOICE CHANGE: 0
CHEST TIGHTNESS: 0
EYE ITCHING: 0
COLOR CHANGE: 0

## 2021-12-22 NOTE — PROGRESS NOTES
2021    TELEHEALTH EVALUATION -- Audio/Visual (During [facility] public health emergency)    HPI:    Edson Rodríguez (:  1980) has requested an audio/video evaluation for the following concern(s):    HPI    SOB: Patient complains of shortness of breath, wheezing, cough. Congestion, headache, body aches. He spoke with Dr. Adali Pimentel about a week ago. More sinus pressure and dropping to chest. Hard to do physical activity. He has tried NSAID and flonase. Review of Systems   Constitutional: Positive for chills. Negative for activity change, appetite change, diaphoresis, fatigue, fever and unexpected weight change. HENT: Positive for congestion, rhinorrhea, sinus pressure and sinus pain. Negative for ear discharge, ear pain, hearing loss, nosebleeds, postnasal drip, sneezing, sore throat, tinnitus, trouble swallowing and voice change. Eyes: Negative for photophobia, pain, discharge, redness and itching. Respiratory: Negative for cough, choking, chest tightness, shortness of breath, wheezing and stridor. Cardiovascular: Negative for chest pain, palpitations and leg swelling. Gastrointestinal: Negative for abdominal pain, blood in stool, constipation, diarrhea, nausea and vomiting. Endocrine: Negative for cold intolerance, heat intolerance, polydipsia and polyuria. Genitourinary: Negative for difficulty urinating, dysuria, enuresis, flank pain, frequency, hematuria and urgency. Musculoskeletal: Negative for back pain, gait problem, joint swelling, neck pain and neck stiffness. Skin: Negative for color change, pallor, rash and wound. Allergic/Immunologic: Negative for environmental allergies and food allergies. Neurological: Negative for dizziness, tremors, syncope, speech difficulty, weakness, light-headedness, numbness and headaches. Hematological: Negative for adenopathy. Does not bruise/bleed easily.    Psychiatric/Behavioral: Negative for agitation, behavioral problems, confusion, decreased concentration, dysphoric mood, hallucinations, self-injury, sleep disturbance and suicidal ideas. The patient is not nervous/anxious and is not hyperactive. Prior to Visit Medications    Medication Sig Taking? Authorizing Provider   amoxicillin-clavulanate (AUGMENTIN) 875-125 MG per tablet Take 1 tablet by mouth 2 times daily for 10 days Yes STEFFANIE Mclain CNP   diazePAM (VALIUM) 10 MG tablet TAKE ONE TABLET BY MOUTH EVERY 6 HOURS AS NEEDED FOR ANXIETY Yes Mery Ortiz MD   fluticasone [place]) 50 MCG/ACT nasal spray 2 sprays by Each Nostril route daily Yes Mery Ortiz MD   rosuvastatin (CRESTOR) 5 MG tablet Take 1 tablet by mouth daily Yes Mery Ortiz MD   testosterone cypionate (DEPOTESTOTERONE CYPIONATE) 200 MG/ML injection Inject 1 mL into the muscle every 14 days for 180 days.  Yes Mery Ortiz MD   lisinopril (PRINIVIL;ZESTRIL) 20 MG tablet Take 1 tablet by mouth daily Yes Mery Ortiz MD   Icosapent Ethyl (VASCEPA) 1 g CAPS capsule Take 2 capsules by mouth 2 times daily Yes Mery Ortiz MD   Needle, Disp, (HYPODERMIC NEEDLE 57SO1-2/2\") 21G X 1-1/2\" MISC Draw up testosterone with 18 gauge and inject with 21 gauge Yes Mery Ortiz MD   Needles & Syringes MISC 1 each by Does not apply route daily Yes Mery Ortiz MD   cyclobenzaprine (FLEXERIL) 10 MG tablet Take 1 tablet by mouth 3 times daily as needed for Muscle spasms Yes Mery Ortiz MD   furosemide (LASIX) 20 MG tablet Take 1 tablet by mouth 2 times daily Yes Mery Ortiz MD   HYDROcodone-acetaminophen ([address]) 7.5-325 MG per tablet  Yes Historical Provider, MD   sertraline (ZOLOFT) 50 MG tablet Take 1 tablet by mouth daily Yes Mery Ortiz MD   DULoxetine (CYMBALTA) 60 MG extended release capsule Take 1 capsule by mouth 2 times daily Yes Mery Ortiz MD   sildenafil (VIAGRA) 100 MG tablet Take 1 tablet by mouth as needed for Erectile Dysfunction Yes Janessa Nieves Alexia Saxena MD   Syringe/Needle, Disp, 18G X 1\" 1 ML MISC Draw up testosterone with 18 gauge and inject with 21 gauge Yes Regina Ponce MD   Cholecalciferol (VITAMIN D) 50 MCG (2000 UT) CAPS capsule Take 1 capsule by mouth daily  Yes Historical Provider, MD   NEEDLE, DISP, 25 G (B-D DISP NEEDLE 25GX1\") 25G X 1\" MISC USE TO INJECT TESTOSTERONE ONCE WEEKLY Yes Regina Ponce MD   MORPHINE, PAIN PUMP REFILL CHARGE,  Yes Historical Provider, MD   melatonin 3 MG TABS tablet Take 6 mg by mouth daily Yes Historical Provider, MD       Social History     Tobacco Use    Smoking status: Current Every Day Smoker     Packs/day: 2.00     Years: 15.00     Pack years: 30.00     Types: Cigarettes    Smokeless tobacco: Never Used    Tobacco comment: pt denies counciling   Vaping Use    Vaping Use: Never used   Substance Use Topics    Alcohol use: No     Alcohol/week: 0.0 standard drinks     Comment: Does not drink    Drug use: No          PHYSICAL EXAMINATION:  [ INSTRUCTIONS:  \"[x]\" Indicates a positive item  \"[]\" Indicates a negative item  -- DELETE ALL ITEMS NOT EXAMINED]  Vital Signs: (As obtained by patient/caregiver or practitioner observation)    Constitutional: [x] Appears well-developed and well-nourished [x] No apparent distress      [] Abnormal-   Mental status  [x] Alert and awake  [x] Oriented to person/place/time [x]Able to follow commands      Eyes:  EOM    [x]  Normal  [] Abnormal-  Sclera  [x]  Normal  [] Abnormal -         Discharge [x]  None visible  [] Abnormal -    HENT:   [x] Normocephalic, atraumatic.   [] Abnormal   [x] Mouth/Throat: Mucous membranes are moist.     External Ears [x] Normal  [] Abnormal-     Neck: [x] No visualized mass     Pulmonary/Chest: [x] Respiratory effort normal.  [x] No visualized signs of difficulty breathing or respiratory distress        [] Abnormal-      Musculoskeletal:   [x] Normal gait with no signs of ataxia         [x] Normal range of motion of neck        [] Abnormal-       Neurological:        [x] No Facial Asymmetry (Cranial nerve 7 motor function) (limited exam to video visit)          [x] No gaze palsy        [] Abnormal-         Skin:        [x] No significant exanthematous lesions or discoloration noted on facial skin         [] Abnormal-            Psychiatric:       [x] Normal Affect [x] No Hallucinations        [] Abnormal-     Other pertinent observable physical exam findings-   Patient looks good on video visit. Not short of breath and able to speak in full sentences. He has some nasal congestion that is dropping down to his chest.  I do not believe we need Covid test or flu test at this time. He has tried symptomatic treatment and Flonase which is just not doing it. Due to this being a TeleHealth encounter, evaluation of the following organ systems is limited: Vitals/Constitutional/EENT/Resp/CV/GI//MS/Neuro/Skin/Heme-Lymph-Imm. ASSESSMENT/PLAN:  1. Acute bacterial sinusitis    - amoxicillin-clavulanate (AUGMENTIN) 875-125 MG per tablet; Take 1 tablet by mouth 2 times daily for 10 days  Dispense: 20 tablet; Refill: 0    Recommended saltwater gargles, warm fluids with honey and a humidifier at night. Flonase, Zyrtec, NSAIDS as needed. Follow up if symptoms worsen or do not improve. Return if symptoms worsen or fail to improve. An  electronic signature was used to authenticate this note. --STEFFANIE Best - CNP on 12/22/2021 at 9:35 AM        Pursuant to the emergency declaration under the [facility] [address] [facility], [facility] waiver authority and the [employer] Act, this Virtual  Visit was conducted, with patient's consent, to reduce the patient's risk of exposure to COVID-19 and provide continuity of care for an established patient. Services were provided through a video synchronous discussion virtually to substitute for in-person clinic visit.     This document was prepared by a combination of typing and transcription through a voice recognition software.

## 2022-02-23 DIAGNOSIS — E23.0 HYPOGONADOTROPIC HYPOGONADISM (HCC): ICD-10-CM

## 2022-02-23 RX ORDER — TESTOSTERONE CYPIONATE 200 MG/ML
200 INJECTION INTRAMUSCULAR
Qty: 2 ML | Refills: 5 | Status: SHIPPED | OUTPATIENT
Start: 2022-02-23 | End: 2022-04-04 | Stop reason: SDUPTHER

## 2022-02-23 NOTE — TELEPHONE ENCOUNTER
Medication:   Requested Prescriptions     Pending Prescriptions Disp Refills    testosterone cypionate (DEPOTESTOTERONE CYPIONATE) 200 MG/ML injection 2 mL 5     Sig: Inject 1 mL into the muscle every 14 days for 180 days.         Last Filled: 10/13/2021  Last appt: 12/22/2021   Next appt: none

## 2022-03-22 ENCOUNTER — TELEPHONE (OUTPATIENT)
Dept: FAMILY MEDICINE CLINIC | Age: 42
End: 2022-03-22

## 2022-03-22 NOTE — TELEPHONE ENCOUNTER
----- Message from Steph Bunch sent at 3/22/2022  4:51 PM EDT -----  Subject: Message to Provider    QUESTIONS  Information for Provider? Patient is getting bloodwork done monday morning   for his testostone levels and he was wondering if he could get blood wrok   orders to check his cholesterol and a workup panel.   -----------------------------------------------------------------------------------------  CALL BACK INFO  What is the best way for the office to contact you? OK to leave message on   voicemail  Preferred Call Back Phone Number? [phone]  -----------------------------------------------------------------------------------------  SCRIPT ANSWERS  Relationship to Patient?  Self

## 2022-03-23 DIAGNOSIS — E78.2 MIXED HYPERLIPIDEMIA: Primary | ICD-10-CM

## 2022-04-04 ENCOUNTER — OFFICE VISIT (OUTPATIENT)
Dept: FAMILY MEDICINE CLINIC | Age: 42
End: 2022-04-04
Payer: MEDICARE

## 2022-04-04 ENCOUNTER — TELEPHONE (OUTPATIENT)
Dept: ADMINISTRATIVE | Age: 42
End: 2022-04-04

## 2022-04-04 VITALS
OXYGEN SATURATION: 96 % | WEIGHT: 269 LBS | HEART RATE: 87 BPM | HEIGHT: 72 IN | DIASTOLIC BLOOD PRESSURE: 80 MMHG | BODY MASS INDEX: 36.44 KG/M2 | SYSTOLIC BLOOD PRESSURE: 135 MMHG | TEMPERATURE: 97.5 F

## 2022-04-04 DIAGNOSIS — H69.81 ACUTE DYSFUNCTION OF RIGHT EUSTACHIAN TUBE: Primary | ICD-10-CM

## 2022-04-04 DIAGNOSIS — E66.01 SEVERE OBESITY (BMI 35.0-39.9) WITH COMORBIDITY (HCC): ICD-10-CM

## 2022-04-04 DIAGNOSIS — F33.1 MODERATE EPISODE OF RECURRENT MAJOR DEPRESSIVE DISORDER (HCC): ICD-10-CM

## 2022-04-04 DIAGNOSIS — E23.0 HYPOGONADOTROPIC HYPOGONADISM (HCC): ICD-10-CM

## 2022-04-04 DIAGNOSIS — F41.9 ANXIETY: ICD-10-CM

## 2022-04-04 PROCEDURE — G8417 CALC BMI ABV UP PARAM F/U: HCPCS | Performed by: FAMILY MEDICINE

## 2022-04-04 PROCEDURE — 99214 OFFICE O/P EST MOD 30 MIN: CPT | Performed by: FAMILY MEDICINE

## 2022-04-04 PROCEDURE — G8427 DOCREV CUR MEDS BY ELIG CLIN: HCPCS | Performed by: FAMILY MEDICINE

## 2022-04-04 PROCEDURE — 4004F PT TOBACCO SCREEN RCVD TLK: CPT | Performed by: FAMILY MEDICINE

## 2022-04-04 RX ORDER — DOXYCYCLINE HYCLATE 100 MG
100 TABLET ORAL 2 TIMES DAILY
Qty: 60 TABLET | Refills: 0 | Status: SHIPPED | OUTPATIENT
Start: 2022-04-04 | End: 2022-05-04

## 2022-04-04 RX ORDER — TESTOSTERONE CYPIONATE 200 MG/ML
200 INJECTION INTRAMUSCULAR
Qty: 4 ML | Refills: 2 | Status: SHIPPED | OUTPATIENT
Start: 2022-04-04 | End: 2022-10-11 | Stop reason: SDUPTHER

## 2022-04-04 RX ORDER — DIAZEPAM 10 MG/1
TABLET ORAL
Qty: 90 TABLET | Refills: 2 | Status: SHIPPED | OUTPATIENT
Start: 2022-04-04 | End: 2022-07-27 | Stop reason: SDUPTHER

## 2022-04-04 RX ORDER — PREDNISONE 10 MG/1
TABLET ORAL
Qty: 30 TABLET | Refills: 0 | Status: SHIPPED | OUTPATIENT
Start: 2022-04-04 | End: 2022-07-27 | Stop reason: ALTCHOICE

## 2022-04-04 ASSESSMENT — PATIENT HEALTH QUESTIONNAIRE - PHQ9
10. IF YOU CHECKED OFF ANY PROBLEMS, HOW DIFFICULT HAVE THESE PROBLEMS MADE IT FOR YOU TO DO YOUR WORK, TAKE CARE OF THINGS AT HOME, OR GET ALONG WITH OTHER PEOPLE: 1
7. TROUBLE CONCENTRATING ON THINGS, SUCH AS READING THE NEWSPAPER OR WATCHING TELEVISION: 1
8. MOVING OR SPEAKING SO SLOWLY THAT OTHER PEOPLE COULD HAVE NOTICED. OR THE OPPOSITE, BEING SO FIGETY OR RESTLESS THAT YOU HAVE BEEN MOVING AROUND A LOT MORE THAN USUAL: 0
SUM OF ALL RESPONSES TO PHQ QUESTIONS 1-9: 6
SUM OF ALL RESPONSES TO PHQ QUESTIONS 1-9: 6
9. THOUGHTS THAT YOU WOULD BE BETTER OFF DEAD, OR OF HURTING YOURSELF: 0
4. FEELING TIRED OR HAVING LITTLE ENERGY: 3
SUM OF ALL RESPONSES TO PHQ QUESTIONS 1-9: 6
3. TROUBLE FALLING OR STAYING ASLEEP: 0
2. FEELING DOWN, DEPRESSED OR HOPELESS: 1
5. POOR APPETITE OR OVEREATING: 1
SUM OF ALL RESPONSES TO PHQ QUESTIONS 1-9: 6

## 2022-04-04 NOTE — TELEPHONE ENCOUNTER
Submitted PA for diazePAM 10MG tablets    Via CMM Key: [id number] STATUS: \"Prior Authorization Not Required\"    Please notify patient.  Thank you

## 2022-04-04 NOTE — PROGRESS NOTES
Deepali Mora (:  1980) is a 43 y.o. male,Established patient, here for evaluation of the following chief complaint(s):  Medication Refill and Abdominal Pain (x1 month)         ASSESSMENT/PLAN:  Sissy Nicole was seen today for medication refill and abdominal pain. Diagnoses and all orders for this visit:    Acute dysfunction of right eustachian tube  -     [address], Sabrina Maker, DO, Otolaryngology, [facility]  Not well controlled. Trial of prednisone  Referred to ent  Hypogonadotropic hypogonadism (HCC)  -     testosterone cypionate (DEPOTESTOTERONE CYPIONATE) 200 MG/ML injection; Inject 1 mL into the muscle every 7 days for 90 days. Stable on IM testosterone   Will get previously ordered blood work done  Moderate episode of recurrent major depressive disorder (HCC)  Stable on zoloft and cymbalta  No symptoms of serotonin syndrome  Anxiety  -     diazePAM (VALIUM) 10 MG tablet; TAKE ONE TABLET BY MOUTH EVERY 6 HOURS AS NEEDED FOR ANXIETY  Stable on valium. Taking as directed  :   Controlled Substances Monitoring: Periodic Controlled Substance Monitoring: Possible medication side effects, risk of tolerance/dependence & alternative treatments discussed. ,No signs of potential drug abuse or diversion identified. Fady Irvin MD)   Severe obesity (BMI 35.0-39. 9) with comorbidity ([address]  Not well controlled but will work on diet  Other orders  -     doxycycline hyclate (VIBRA-TABS) 100 MG tablet; Take 1 tablet by mouth 2 times daily  -     predniSONE (DELTASONE) 10 MG tablet; 4 po daily for 3 days, 3 for 3 days, 2 for 3 days, 1 for 3 days, then stop         No follow-ups on file. Subjective   SUBJECTIVE/OBJECTIVE:  HPI   Pt is a of 43 y.o. male comes in today with   Chief Complaint   Patient presents with    Medication Refill    Abdominal Pain     x1 month   testosterone every other week  Less effect from injections. Right ear clogged for a month. Anxiety stable on valium.  Taking less frequently. recurrent infected hair follicles. Notes abd pain where hernia was. Vitals:    04/04/22 1416   BP: 135/80   Site: Right Upper Arm   Position: Sitting   Cuff Size: Medium Adult   Pulse: 87   Temp: 97.5 °F (36.4 °C)   TempSrc: Temporal   SpO2: 96%   Weight: 269 lb (122 kg)   Height: 6' (1.829 m)        Review of Systems       Objective   Physical Exam         An electronic signature was used to authenticate this note.     --Enriqueta Ballard MD

## 2022-07-15 ENCOUNTER — NURSE TRIAGE (OUTPATIENT)
Dept: OTHER | Facility: CLINIC | Age: 42
End: 2022-07-15

## 2022-07-15 DIAGNOSIS — F41.9 ANXIETY: ICD-10-CM

## 2022-07-15 RX ORDER — ICOSAPENT ETHYL 1000 MG/1
2 CAPSULE ORAL 2 TIMES DAILY
Qty: 120 CAPSULE | Refills: 2 | Status: SHIPPED | OUTPATIENT
Start: 2022-07-15 | End: 2022-10-11

## 2022-07-15 RX ORDER — DIAZEPAM 10 MG/1
TABLET ORAL
Qty: 90 TABLET | Refills: 0 | OUTPATIENT
Start: 2022-07-15 | End: 2022-08-14

## 2022-07-15 NOTE — TELEPHONE ENCOUNTER
Medication:   Requested Prescriptions      No prescriptions requested or ordered in this encounter        Last Filled:      Patient Phone Number: [phone] (home)     Last appt: 4/4/2022   Next appt: Visit date not found    Last OARRS:   RX Monitoring 4/10/2022   Attestation -   Periodic Controlled Substance Monitoring Possible medication side effects, risk of tolerance/dependence & alternative treatments discussed. ;No signs of potential drug abuse or diversion identified.

## 2022-07-15 NOTE — TELEPHONE ENCOUNTER
----- Message from [facility] sent at 7/15/2022 11:43 AM EDT -----  Subject: Refill Request    QUESTIONS  Name of Medication? diazePAM (VALIUM) 10 MG tablet  Patient-reported dosage and instructions? 10 MG  How many days do you have left? 41  Preferred Pharmacy? [facility] [id number]  Pharmacy phone number (if available)? [phone]  ---------------------------------------------------------------------------  --------------,  Name of Medication? Icosapent Ethyl (VASCEPA) 1 g CAPS capsule  Patient-reported dosage and instructions? unknown  How many days do you have left? Unknown  Preferred Pharmacy? [facility] [id number]  Pharmacy phone number (if available)? [phone]  ---------------------------------------------------------------------------  --------------  Omelia Counter INFO  What is the best way for the office to contact you? OK to leave message on   voicemail  Preferred Call Back Phone Number? [phone]  ---------------------------------------------------------------------------  --------------  SCRIPT ANSWERS  Relationship to Patient?  Self

## 2022-07-27 ENCOUNTER — OFFICE VISIT (OUTPATIENT)
Dept: FAMILY MEDICINE CLINIC | Age: 42
End: 2022-07-27
Payer: MEDICARE

## 2022-07-27 VITALS
HEART RATE: 81 BPM | HEIGHT: 72 IN | BODY MASS INDEX: 34.21 KG/M2 | DIASTOLIC BLOOD PRESSURE: 70 MMHG | OXYGEN SATURATION: 97 % | WEIGHT: 252.6 LBS | SYSTOLIC BLOOD PRESSURE: 128 MMHG

## 2022-07-27 DIAGNOSIS — B96.89 ACUTE BACTERIAL SINUSITIS: ICD-10-CM

## 2022-07-27 DIAGNOSIS — J01.90 ACUTE BACTERIAL SINUSITIS: ICD-10-CM

## 2022-07-27 DIAGNOSIS — E23.0 HYPOGONADOTROPIC HYPOGONADISM (HCC): Primary | ICD-10-CM

## 2022-07-27 DIAGNOSIS — F41.9 ANXIETY: ICD-10-CM

## 2022-07-27 PROCEDURE — 4004F PT TOBACCO SCREEN RCVD TLK: CPT | Performed by: FAMILY MEDICINE

## 2022-07-27 PROCEDURE — G8417 CALC BMI ABV UP PARAM F/U: HCPCS | Performed by: FAMILY MEDICINE

## 2022-07-27 PROCEDURE — G8427 DOCREV CUR MEDS BY ELIG CLIN: HCPCS | Performed by: FAMILY MEDICINE

## 2022-07-27 PROCEDURE — 99214 OFFICE O/P EST MOD 30 MIN: CPT | Performed by: FAMILY MEDICINE

## 2022-07-27 RX ORDER — DIAZEPAM 10 MG/1
TABLET ORAL
Qty: 90 TABLET | Refills: 2 | Status: SHIPPED | OUTPATIENT
Start: 2022-07-27 | End: 2022-08-26

## 2022-07-27 RX ORDER — ROSUVASTATIN CALCIUM 5 MG/1
5 TABLET, COATED ORAL DAILY
Qty: 90 TABLET | Refills: 1 | Status: SHIPPED | OUTPATIENT
Start: 2022-07-27 | End: 2022-10-11

## 2022-07-27 RX ORDER — FUROSEMIDE 20 MG/1
20 TABLET ORAL 2 TIMES DAILY
Qty: 60 TABLET | Refills: 1 | Status: SHIPPED | OUTPATIENT
Start: 2022-07-27

## 2022-07-27 RX ORDER — AMOXICILLIN AND CLAVULANATE POTASSIUM 875; 125 MG/1; MG/1
1 TABLET, FILM COATED ORAL 2 TIMES DAILY
Qty: 20 TABLET | Refills: 0 | Status: SHIPPED | OUTPATIENT
Start: 2022-07-27 | End: 2022-08-06

## 2022-07-27 SDOH — ECONOMIC STABILITY: FOOD INSECURITY: WITHIN THE PAST 12 MONTHS, YOU WORRIED THAT YOUR FOOD WOULD RUN OUT BEFORE YOU GOT MONEY TO BUY MORE.: NEVER TRUE

## 2022-07-27 SDOH — ECONOMIC STABILITY: FOOD INSECURITY: WITHIN THE PAST 12 MONTHS, THE FOOD YOU BOUGHT JUST DIDN'T LAST AND YOU DIDN'T HAVE MONEY TO GET MORE.: NEVER TRUE

## 2022-07-27 ASSESSMENT — SOCIAL DETERMINANTS OF HEALTH (SDOH): HOW HARD IS IT FOR YOU TO PAY FOR THE VERY BASICS LIKE FOOD, HOUSING, MEDICAL CARE, AND HEATING?: SOMEWHAT HARD

## 2022-07-27 NOTE — PROGRESS NOTES
Latonya Jones (:  1980) is a 43 y.o. male,Established patient, here for evaluation of the following chief complaint(s):  3 Month Follow-Up, Medication Check, and Other (Pressure in head, diagnosed with ear infection & fluid on ear - treated with abx.)         ASSESSMENT/PLAN:  Dread Pascual was seen today for 3 month follow-up, medication check and other. Diagnoses and all orders for this visit:    Hypogonadotropic hypogonadism ([address]  Due for blood work to see ifs table on testosterone   Anxiety  -     diazePAM (VALIUM) 10 MG tablet; TAKE ONE TABLET BY MOUTH EVERY 6 HOURS AS NEEDED FOR ANXIETY  Stable on valium  Controlled Substances Monitoring: Periodic Controlled Substance Monitoring: Possible medication side effects, risk of tolerance/dependence & alternative treatments discussed., No signs of potential drug abuse or diversion identified. Lukas Payne MD)   Acute bacterial sinusitis  -     amoxicillin-clavulanate (AUGMENTIN) 875-125 MG per tablet; Take 1 tablet by mouth in the morning and 1 tablet before bedtime. Do all this for 10 days. Incompletely treated. Covering with augmentin  Other orders  -     rosuvastatin (CRESTOR) 5 MG tablet; Take 1 tablet by mouth in the morning.  -     furosemide (LASIX) 20 MG tablet; Take 1 tablet by mouth in the morning and 1 tablet before bedtime. -     sertraline (ZOLOFT) 50 MG tablet; Take 1 tablet by mouth in the morning.  -     Needle, Disp, (HYPODERMIC NEEDLE 72CV9-5/2\") 21G X 1-1/2\" MISC; Draw up testosterone with 18 gauge and inject with 21 gauge  -     NEEDLE, DISP, 18 G 18G X 1-1/2\" MISC; Draw up testosterone with 18 gauge and inject with 21 gauge       No follow-ups on file.          Subjective   SUBJECTIVE/OBJECTIVE:  HPIPt is a of 43 y.o. male comes in today with   Chief Complaint   Patient presents with    3 Month Follow-Up    Medication Check    Other     Pressure in head, diagnosed with ear infection & fluid on ear - treated with abx.     follow up low T and anxiety  Was on 5 days of amox for sinus infection. Got a little better but still a lot of pressure that did not go away    Review of Systems       Objective   Physical Exam         An electronic signature was used to authenticate this note.     --Марина Reeder MD

## 2022-09-20 ENCOUNTER — TELEPHONE (OUTPATIENT)
Dept: FAMILY MEDICINE CLINIC | Age: 42
End: 2022-09-20

## 2022-09-20 NOTE — TELEPHONE ENCOUNTER
Pt called requesting to see Dr. Dashawn Maharaj today. Advised no appts available. Pt states he was given a round of Anti-Biotics on his last visit 07/27/22 and has since taken another round prescribed by an Urgent Care, about a month ago. Pt still experiencing a lot of pressure in both ears, sharp pain and a headache. If something's called in, send to [facility] in [place]. I also scheduled pt to see Nurse Angelo Narayan on next Monday at 2:20.

## 2022-09-24 ENCOUNTER — HOSPITAL ENCOUNTER (OUTPATIENT)
Age: 42
Discharge: HOME | End: 2022-09-24
Payer: MEDICARE

## 2022-09-24 DIAGNOSIS — I10 BENIGN HYPERTENSION: ICD-10-CM

## 2022-09-24 DIAGNOSIS — E78.2 MIXED HYPERLIPIDEMIA: ICD-10-CM

## 2022-09-24 DIAGNOSIS — E23.0 HYPOGONADOTROPIC HYPOGONADISM (HCC): ICD-10-CM

## 2022-09-24 LAB
A/G RATIO: 2.5 (ref 1.1–2.2)
ALBUMIN SERPL-MCNC: 4.7 G/DL (ref 3.4–5)
ALP BLD-CCNC: 56 U/L (ref 40–129)
ALT SERPL-CCNC: 15 U/L (ref 10–40)
ANION GAP SERPL CALCULATED.3IONS-SCNC: 9 MMOL/L (ref 3–16)
AST SERPL-CCNC: 11 U/L (ref 15–37)
BILIRUB SERPL-MCNC: 0.3 MG/DL (ref 0–1)
BUN BLDV-MCNC: 17 MG/DL (ref 7–20)
CALCIUM SERPL-MCNC: 9.7 MG/DL (ref 8.3–10.6)
CHLORIDE BLD-SCNC: 108 MMOL/L (ref 99–110)
CHOLESTEROL, TOTAL: 202 MG/DL (ref 0–199)
CO2: 27 MMOL/L (ref 21–32)
CREAT SERPL-MCNC: 1 MG/DL (ref 0.9–1.3)
GFR AFRICAN AMERICAN: >60
GFR NON-AFRICAN AMERICAN: >60
GLUCOSE BLD-MCNC: 93 MG/DL (ref 70–99)
HCT VFR BLD CALC: 48.6 % (ref 40.5–52.5)
HDLC SERPL-MCNC: 30 MG/DL (ref 40–60)
HEMOGLOBIN: 16.1 G/DL (ref 13.5–17.5)
LDL CHOLESTEROL CALCULATED: 144 MG/DL
MCH RBC QN AUTO: 28.8 PG (ref 26–34)
MCHC RBC AUTO-ENTMCNC: 33.2 G/DL (ref 31–36)
MCV RBC AUTO: 86.8 FL (ref 80–100)
PDW BLD-RTO: 13.1 % (ref 12.4–15.4)
PLATELET # BLD: 186 K/UL (ref 135–450)
PMV BLD AUTO: 9.5 FL (ref 5–10.5)
POTASSIUM SERPL-SCNC: 4.9 MMOL/L (ref 3.5–5.1)
RBC # BLD: 5.59 M/UL (ref 4.2–5.9)
SODIUM BLD-SCNC: 144 MMOL/L (ref 136–145)
TOTAL PROTEIN: 6.6 G/DL (ref 6.4–8.2)
TRIGL SERPL-MCNC: 139 MG/DL (ref 0–150)
VLDLC SERPL CALC-MCNC: 28 MG/DL
WBC # BLD: 8.3 K/UL (ref 4–11)

## 2022-09-24 PROCEDURE — 36415 COLL VENOUS BLD VENIPUNCTURE: CPT

## 2022-09-24 PROCEDURE — 84403 ASSAY OF TOTAL TESTOSTERONE: CPT

## 2022-09-24 PROCEDURE — 80061 LIPID PANEL: CPT

## 2022-09-24 PROCEDURE — 84270 ASSAY OF SEX HORMONE GLOBUL: CPT

## 2022-09-24 PROCEDURE — 80053 COMPREHEN METABOLIC PANEL: CPT

## 2022-09-24 PROCEDURE — 85027 COMPLETE CBC AUTOMATED: CPT

## 2022-09-26 ENCOUNTER — OFFICE VISIT (OUTPATIENT)
Dept: FAMILY MEDICINE CLINIC | Age: 42
End: 2022-09-26
Payer: MEDICARE

## 2022-09-26 VITALS
HEIGHT: 72 IN | TEMPERATURE: 97.7 F | WEIGHT: 259 LBS | SYSTOLIC BLOOD PRESSURE: 108 MMHG | OXYGEN SATURATION: 98 % | DIASTOLIC BLOOD PRESSURE: 78 MMHG | BODY MASS INDEX: 35.08 KG/M2 | HEART RATE: 82 BPM

## 2022-09-26 DIAGNOSIS — H73.891 RETRACTION OF TYMPANIC MEMBRANE OF RIGHT EAR: ICD-10-CM

## 2022-09-26 DIAGNOSIS — H65.05 RECURRENT ACUTE SEROUS OTITIS MEDIA OF LEFT EAR: Primary | ICD-10-CM

## 2022-09-26 PROCEDURE — 99213 OFFICE O/P EST LOW 20 MIN: CPT | Performed by: NURSE PRACTITIONER

## 2022-09-26 PROCEDURE — G8417 CALC BMI ABV UP PARAM F/U: HCPCS | Performed by: NURSE PRACTITIONER

## 2022-09-26 PROCEDURE — G8427 DOCREV CUR MEDS BY ELIG CLIN: HCPCS | Performed by: NURSE PRACTITIONER

## 2022-09-26 PROCEDURE — 4004F PT TOBACCO SCREEN RCVD TLK: CPT | Performed by: NURSE PRACTITIONER

## 2022-09-26 RX ORDER — DIAZEPAM 10 MG/1
TABLET ORAL
COMMUNITY
Start: 2022-08-29

## 2022-09-26 RX ORDER — PREDNISONE 10 MG/1
10 TABLET ORAL 2 TIMES DAILY
Qty: 10 TABLET | Refills: 0 | Status: SHIPPED | OUTPATIENT
Start: 2022-09-26 | End: 2022-10-01

## 2022-09-26 ASSESSMENT — ENCOUNTER SYMPTOMS
CHEST TIGHTNESS: 0
SINUS PAIN: 0
ABDOMINAL PAIN: 0
WHEEZING: 0
CONSTIPATION: 0
SORE THROAT: 0
TROUBLE SWALLOWING: 0
SINUS PRESSURE: 0
BACK PAIN: 0
VOMITING: 0
VOICE CHANGE: 0
EYE ITCHING: 0
COLOR CHANGE: 0
PHOTOPHOBIA: 0
NAUSEA: 0
BLOOD IN STOOL: 0
EYE DISCHARGE: 0
COUGH: 0
RHINORRHEA: 0
STRIDOR: 0
SHORTNESS OF BREATH: 0
EYE PAIN: 0
DIARRHEA: 0
EYE REDNESS: 0
CHOKING: 0

## 2022-09-26 NOTE — PROGRESS NOTES
Chief Complaint   Patient presents with    Otalgia     Both ears    Neck Pain       /78 (Site: Right Upper Arm, Position: Sitting, Cuff Size: Medium Adult)   Pulse 82   Temp 97.7 °F (36.5 °C)   Ht 6' (1.829 m)   Wt 259 lb (117.5 kg)   SpO2 98%   BMI 35.13 kg/m²     HPI:  Annie Gaitan is a 43 y.o. (: 1980) here today   for   HPI    Patient's medications, allergies, past medical, surgical, social and family histories were reviewed and updated as appropriate. Pressure in ears: Started in urgent care when pressure in ears. Had 2 rounds abx, feels pressure in ears and causing headaches. NSAIDS help some. He is having neck aches as well. Will follow up if headaches continues. Suspect from pressure on ears. ROS:  Review of Systems   Constitutional:  Negative for activity change, appetite change, chills, diaphoresis, fatigue, fever and unexpected weight change. HENT:  Positive for ear pain. Negative for congestion, ear discharge, hearing loss, nosebleeds, postnasal drip, rhinorrhea, sinus pressure, sinus pain, sneezing, sore throat, tinnitus, trouble swallowing and voice change. Eyes:  Negative for photophobia, pain, discharge, redness and itching. Respiratory:  Negative for cough, choking, chest tightness, shortness of breath, wheezing and stridor. Cardiovascular:  Negative for chest pain, palpitations and leg swelling. Gastrointestinal:  Negative for abdominal pain, blood in stool, constipation, diarrhea, nausea and vomiting. Endocrine: Negative for cold intolerance, heat intolerance, polydipsia and polyuria. Genitourinary:  Negative for difficulty urinating, dysuria, enuresis, flank pain, frequency, hematuria and urgency. Musculoskeletal:  Negative for back pain, gait problem, joint swelling, neck pain and neck stiffness. Skin:  Negative for color change, pallor, rash and wound. Allergic/Immunologic: Negative for environmental allergies and food allergies. Neurological:  Negative for dizziness, tremors, syncope, speech difficulty, weakness, light-headedness, numbness and headaches. Hematological:  Negative for adenopathy. Does not bruise/bleed easily. Psychiatric/Behavioral:  Negative for agitation, behavioral problems, confusion, decreased concentration, dysphoric mood, hallucinations, self-injury, sleep disturbance and suicidal ideas. The patient is not nervous/anxious and is not hyperactive. Prior to Visit Medications    Medication Sig Taking? Authorizing Provider   diazePAM (VALIUM) 10 MG tablet  Yes Historical Provider, MD   predniSONE (DELTASONE) 10 MG tablet Take 1 tablet by mouth 2 times daily for 5 days Yes Hyun Giang, APRN - CNP   rosuvastatin (CRESTOR) 5 MG tablet Take 1 tablet by mouth in the morning. Yes Chelsey Dyer MD   furosemide (LASIX) 20 MG tablet Take 1 tablet by mouth in the morning and 1 tablet before bedtime. Yes Chelsey Dyer MD   sertraline (ZOLOFT) 50 MG tablet Take 1 tablet by mouth in the morning.  Yes Chelsey Dyer MD   Needle, Disp, (HYPODERMIC NEEDLE 71AX8-6/2\") 21G X 1-1/2\" [address] [place] [address] Draw up testosterone with 18 gauge and inject with 21 gauge Yes Chelsey Dyer MD   NEEDLE, DISP, 18 G 18G X 1-1/2\" [address] [place] [address] Draw up testosterone with 18 gauge and inject with 21 gauge Yes Chelsey Dyer MD   Icosapent Ethyl (VASCEPA) 1 g CAPS capsule Take 2 capsules by mouth 2 times daily Yes Chelsey Dyer MD   HYDROcodone-acetaminophen ([address]) 7.5-325 MG per tablet  Yes Historical Provider, MD   sildenafil (VIAGRA) 100 MG tablet Take 1 tablet by mouth as needed for Erectile Dysfunction Yes Chelsey Dyer MD   Cholecalciferol (VITAMIN D) 50 MCG (2000 UT) CAPS capsule Take 1 capsule by mouth daily  Yes Historical Provider, MD   MORPHINE, PAIN PUMP REFILL CHARGE,  Yes Historical Provider, MD   melatonin 3 MG TABS tablet Take 6 mg by mouth daily Yes Historical Provider, MD   testosterone cypionate (DEPOTESTOTERONE CYPIONATE) 200 MG/ML injection Inject 1 mL into the muscle every 7 days for 90 days. Aaron Armando MD       No Known Allergies    OBJECTIVE:      BP Readings from Last 2 Encounters:   09/26/22 108/78   07/27/22 128/70       Wt Readings from Last 3 Encounters:   09/26/22 259 lb (117.5 kg)   07/27/22 252 lb 9.6 oz (114.6 kg)   04/04/22 269 lb (122 kg)       Physical Exam  Vitals reviewed. Constitutional:       General: He is not in acute distress. Appearance: Normal appearance. He is well-developed. HENT:      Head: Normocephalic and atraumatic. Right Ear: Hearing, ear canal and external ear normal. Tympanic membrane is retracted. Left Ear: Hearing, ear canal and external ear normal. Tympanic membrane is bulging. Nose:      Right Sinus: No maxillary sinus tenderness or frontal sinus tenderness. Left Sinus: No maxillary sinus tenderness or frontal sinus tenderness. Eyes:      General:         Right eye: No discharge. Left eye: No discharge. Conjunctiva/sclera: Conjunctivae normal.      Pupils: Pupils are equal, round, and reactive to light. Neck:      Thyroid: No thyromegaly. Vascular: No JVD. Trachea: No tracheal deviation. Cardiovascular:      Rate and Rhythm: Normal rate and regular rhythm. Heart sounds: Normal heart sounds. No murmur heard. No friction rub. Pulmonary:      Effort: Pulmonary effort is normal. No respiratory distress. Breath sounds: Normal breath sounds. No stridor. No decreased breath sounds, wheezing, rhonchi or rales. Musculoskeletal:         General: No tenderness. Cervical back: Normal range of motion. Lymphadenopathy:      Cervical: No cervical adenopathy. Skin:     General: Skin is warm and dry. Capillary Refill: Capillary refill takes less than 2 seconds. Findings: No rash. Neurological:      Mental Status: He is alert and oriented to person, place, and time. Sensory: Sensation is intact.       Motor: Motor function is intact. Coordination: Coordination normal.   Psychiatric:         Attention and Perception: Attention and perception normal.         Mood and Affect: Mood normal.         Speech: Speech normal.         Behavior: Behavior normal. Behavior is cooperative. Thought Content: Thought content normal.         Cognition and Memory: Cognition normal.         Judgment: Judgment normal.       ASSESSMENT/PLAN:    1. Recurrent acute serous otitis media of left ear    - predniSONE (DELTASONE) 10 MG tablet; Take 1 tablet by mouth 2 times daily for 5 days  Dispense: 10 tablet; Refill: 0  - Audrey Emanuel MD, Otolaryngology [facility]    2. Retraction of tympanic membrane of right ear    - predniSONE (DELTASONE) 10 MG tablet; Take 1 tablet by mouth 2 times daily for 5 days  Dispense: 10 tablet; Refill: 0  - Audrey Emanuel MD, Otolaryngology [facility]    He will follow up if headache continues after seeing ent or prednisone treatment. Follow up if symptoms do not improve or worsen. If the patient becomes short of breath go straight to the ER or call 911.

## 2022-09-27 LAB
SEX HORMONE BINDING GLOBULIN: 20 NMOL/L (ref 11–80)
TESTOSTERONE FREE-NONMALE: 18 PG/ML (ref 47–244)
TESTOSTERONE TOTAL: 76 NG/DL (ref 220–1000)

## 2022-10-04 ENCOUNTER — OFFICE VISIT (OUTPATIENT)
Dept: ENT CLINIC | Age: 42
End: 2022-10-04
Payer: MEDICARE

## 2022-10-04 VITALS
BODY MASS INDEX: 35.08 KG/M2 | DIASTOLIC BLOOD PRESSURE: 87 MMHG | WEIGHT: 259 LBS | SYSTOLIC BLOOD PRESSURE: 127 MMHG | HEART RATE: 83 BPM | HEIGHT: 72 IN

## 2022-10-04 DIAGNOSIS — J34.89 NASAL OBSTRUCTION: ICD-10-CM

## 2022-10-04 DIAGNOSIS — R42 DIZZINESS: ICD-10-CM

## 2022-10-04 DIAGNOSIS — J34.2 DNS (DEVIATED NASAL SEPTUM): ICD-10-CM

## 2022-10-04 DIAGNOSIS — R51.9 CHRONIC NONINTRACTABLE HEADACHE, UNSPECIFIED HEADACHE TYPE: ICD-10-CM

## 2022-10-04 DIAGNOSIS — G89.29 CHRONIC NONINTRACTABLE HEADACHE, UNSPECIFIED HEADACHE TYPE: ICD-10-CM

## 2022-10-04 DIAGNOSIS — H92.03 OTALGIA OF BOTH EARS: Primary | ICD-10-CM

## 2022-10-04 PROCEDURE — 92504 EAR MICROSCOPY EXAMINATION: CPT | Performed by: OTOLARYNGOLOGY

## 2022-10-04 PROCEDURE — G8417 CALC BMI ABV UP PARAM F/U: HCPCS | Performed by: OTOLARYNGOLOGY

## 2022-10-04 PROCEDURE — 99204 OFFICE O/P NEW MOD 45 MIN: CPT | Performed by: OTOLARYNGOLOGY

## 2022-10-04 PROCEDURE — 4004F PT TOBACCO SCREEN RCVD TLK: CPT | Performed by: OTOLARYNGOLOGY

## 2022-10-04 PROCEDURE — G8427 DOCREV CUR MEDS BY ELIG CLIN: HCPCS | Performed by: OTOLARYNGOLOGY

## 2022-10-04 PROCEDURE — G8484 FLU IMMUNIZE NO ADMIN: HCPCS | Performed by: OTOLARYNGOLOGY

## 2022-10-04 ASSESSMENT — ENCOUNTER SYMPTOMS
VOICE CHANGE: 0
EYE ITCHING: 0
EYE DISCHARGE: 0
SINUS PRESSURE: 0
CHOKING: 0
FACIAL SWELLING: 0
CONSTIPATION: 0
BACK PAIN: 0
COUGH: 0
VOMITING: 0
BLOOD IN STOOL: 0
RHINORRHEA: 0
SHORTNESS OF BREATH: 0
NAUSEA: 0
SORE THROAT: 0
STRIDOR: 0
TROUBLE SWALLOWING: 0
WHEEZING: 0
SINUS PAIN: 0
DIARRHEA: 0
COLOR CHANGE: 0
PHOTOPHOBIA: 0

## 2022-10-04 NOTE — PROGRESS NOTES
[facility]  [address] [address] [facility], [address] [place] [address], [address] [place] [address] Damien  P: [phone]  F: [phone]       Patient     Evette Galvan  1980    ChiefComplaint     Chief Complaint   Patient presents with    Ear Problem     Patient is here for bilateral ear pressure x's 2 months. Antibiotics arent helping. States he has headaches and some pain but denies any drainage        History of Present Illness     Evette Galvan is a pleasant 43 y.o. male who presents as a new patient for bilateral ear pressure, headaches for 2 months. Symptoms began in July. No associated respiratory infection. No change in hearing, tinnitus, otorrhea. He has been experiencing some sensation of off balance and dizziness. He does have a history of allergic rhinitis has been worse this year. Denies any history of ear surgeries. He does have a history of right-sided tympanic membrane perforation. He was tried on multiple rounds of antibiotics without relief. He started to take prednisone but it made him too jittery so he discontinued it. He has been taking ibuprofen 8 to 1200 mg a day with some relief. Patient is edentulous and wears full upper and lower dentures. He does admit to history of tobacco use. Denies fevers, chills, night sweats or unexpected weight loss. Additionally, he does admit to some nasal obstruction symptoms.     Past Medical History     Past Medical History:   Diagnosis Date    Abnormal thyroid blood test     Anxiety     Back pain     lower back pain    Depression     Elevated homocysteine     Fibromyalgia     GERD (gastroesophageal reflux disease)     Hypercholesteremia     Hypogonadotropic hypogonadism (HCC)     Neuropathy        Past Surgical History     Past Surgical History:   Procedure Laterality Date    EPIDIDYMECTOMY      left    EPIGASTRIC HERNIA REPAIR      HERNIA REPAIR  11/6/2013    LAPAROSCOPIC REPAIR RECURRENT INCISONAL HERNIA WITH MESH    KIDNEY SURGERY      ureter pinched shut on left, did not improve kidney function and left kidney was removed    NECK SURGERY      mass removed    OTHER SURGICAL HISTORY      spinal cord stimulator    NC NJX DX/THER SBST INTRLMNR CRV/THRC W/IMG GDN N/A 9/11/2018    LEFT ABDOMINAL INTRATHECAL PAIN PUMP AND CATHETER INSERTION performed by Dania Reeder MD at [address] [place] [place] [address]         Family History     Family History   Problem Relation Age of Onset    Thyroid Disease Mother     High Cholesterol Mother     Diabetes Father         thinks he is, never diagnosed    Schizophrenia Father         paranoid       Social History     Social History     Socioeconomic History    Marital status:      Spouse name: Sincere Andres    Number of children: 2    Years of education: Not on file    Highest education level: Not on file   Occupational History    Occupation: disability     Comment: depression   Tobacco Use    Smoking status: Every Day     Packs/day: 2.00     Years: 15.00     Pack years: 30.00     Types: Cigarettes    Smokeless tobacco: Never    Tobacco comments:     pt denies counciling   Vaping Use    Vaping Use: Never used   Substance and Sexual Activity    Alcohol use: No     Alcohol/week: 0.0 standard drinks     Comment: Does not drink    Drug use: No    Sexual activity: Yes     Partners: Female   Other Topics Concern    Not on file   Social History Narrative    Previously worked in construction, on disability since 2007     Social Determinants of Health     Financial Resource Strain: Medium Risk    Difficulty of Paying Living Expenses: Somewhat hard   Food Insecurity: No Food Insecurity    Worried About Running Out of Food in the Last Year: Never true    Ran Out of Food in the Last Year: Never true   Transportation Needs: Not on file   Physical Activity: Not on file   Stress: Not on file   Social Connections: Not on file   Intimate Partner Violence: Not on file   Housing Stability: Not on file       Allergies     No Known Allergies    Medications     Current Outpatient Medications   Medication Sig Dispense Refill    diazePAM (VALIUM) 10 MG tablet       rosuvastatin (CRESTOR) 5 MG tablet Take 1 tablet by mouth in the morning. 90 tablet 1    furosemide (LASIX) 20 MG tablet Take 1 tablet by mouth in the morning and 1 tablet before bedtime. 60 tablet 1    sertraline (ZOLOFT) 50 MG tablet Take 1 tablet by mouth in the morning. 90 tablet 1    Needle, Disp, (HYPODERMIC NEEDLE 99VK4-0/2\") 21G X 1-1/2\" MISC Draw up testosterone with 18 gauge and inject with 21 gauge 10 each 2    NEEDLE, DISP, 18 G 18G X 1-1/2\" MISC Draw up testosterone with 18 gauge and inject with 21 gauge 10 each 2    Icosapent Ethyl (VASCEPA) 1 g CAPS capsule Take 2 capsules by mouth 2 times daily 120 capsule 2    testosterone cypionate (DEPOTESTOTERONE CYPIONATE) 200 MG/ML injection Inject 1 mL into the muscle every 7 days for 90 days. 4 mL 2    HYDROcodone-acetaminophen (NORCO) 7.5-325 MG per tablet       sildenafil (VIAGRA) 100 MG tablet Take 1 tablet by mouth as needed for Erectile Dysfunction 10 tablet 5    Cholecalciferol (VITAMIN D) 50 MCG (2000 UT) CAPS capsule Take 1 capsule by mouth daily       MORPHINE, PAIN PUMP REFILL CHARGE,       melatonin 3 MG TABS tablet Take 6 mg by mouth daily       No current facility-administered medications for this visit. Review of Systems     Review of Systems   Constitutional:  Negative for activity change, appetite change, chills, diaphoresis, fatigue, fever and unexpected weight change. HENT:  Positive for congestion and ear pain. Negative for dental problem, drooling, ear discharge, facial swelling, hearing loss, mouth sores, nosebleeds, postnasal drip, rhinorrhea, sinus pressure, sinus pain, sneezing, sore throat, tinnitus, trouble swallowing and voice change. Eyes:  Negative for photophobia, discharge, itching and visual disturbance.    Respiratory:  Negative for cough, choking, shortness of breath, wheezing and stridor. Gastrointestinal:  Negative for blood in stool, constipation, diarrhea, nausea and vomiting. Endocrine: Negative for cold intolerance, heat intolerance, polyphagia and polyuria. Musculoskeletal:  Negative for back pain, gait problem, neck pain and neck stiffness. Skin:  Negative for color change, pallor, rash and wound. Neurological:  Positive for dizziness and headaches. Negative for syncope, facial asymmetry, speech difficulty, light-headedness and numbness. Hematological:  Negative for adenopathy. Does not bruise/bleed easily. Psychiatric/Behavioral:  Negative for agitation, confusion and sleep disturbance. PhysicalExam     Vitals:    10/04/22 1453   BP: 127/87   Pulse: 83       Physical Exam  Constitutional:       Appearance: He is well-developed. HENT:      Head: Normocephalic and atraumatic. Not macrocephalic and not microcephalic. No raccoon eyes, Silverman's sign, abrasion, contusion, right periorbital erythema, left periorbital erythema or laceration. Hair is normal.      Jaw: No trismus or tenderness. Right Ear: Hearing, tympanic membrane and external ear normal. No decreased hearing noted. No drainage, swelling or tenderness. No middle ear effusion. No mastoid tenderness. Tympanic membrane is not perforated, retracted or bulging. Tympanic membrane has normal mobility. Left Ear: Hearing, tympanic membrane and external ear normal. No decreased hearing noted. No drainage, swelling or tenderness. No middle ear effusion. No mastoid tenderness. Tympanic membrane is not perforated, retracted or bulging. Tympanic membrane has normal mobility. Nose: Septal deviation (4+) present. No nasal deformity, laceration, mucosal edema or rhinorrhea. Right Turbinates: Enlarged and swollen. Left Turbinates: Enlarged and swollen. Right Sinus: No maxillary sinus tenderness or frontal sinus tenderness.       Left Sinus: No maxillary sinus tenderness or frontal sinus tenderness. Mouth/Throat:      Mouth: Mucous membranes are not pale, not dry and not cyanotic. No lacerations or oral lesions. Dentition: Normal dentition. No dental caries or dental abscesses. Pharynx: Uvula midline. No oropharyngeal exudate, posterior oropharyngeal erythema or uvula swelling. Tonsils: No tonsillar abscesses. Eyes:      General: Lids are normal.         Right eye: No discharge. Left eye: No discharge. Extraocular Movements:      Right eye: Normal extraocular motion and no nystagmus. Left eye: Normal extraocular motion and no nystagmus. Conjunctiva/sclera:      Right eye: No chemosis or exudate. Left eye: No chemosis or exudate. Neck:      Thyroid: No thyroid mass or thyromegaly. Vascular: Normal carotid pulses. Trachea: No tracheal tenderness or tracheal deviation. Cardiovascular:      Rate and Rhythm: Normal rate and regular rhythm. Pulmonary:      Effort: No tachypnea, bradypnea or respiratory distress. Breath sounds: No stridor. Musculoskeletal:      Right shoulder: Normal range of motion. Cervical back: Neck supple. Lymphadenopathy:      Head:      Right side of head: No submental, submandibular, tonsillar, preauricular, posterior auricular or occipital adenopathy. Left side of head: No submental, submandibular, tonsillar, preauricular, posterior auricular or occipital adenopathy. Cervical: No cervical adenopathy. Right cervical: No superficial, deep or posterior cervical adenopathy. Left cervical: No superficial, deep or posterior cervical adenopathy. Skin:     General: Skin is warm and dry. Findings: No bruising, erythema, laceration, lesion or rash. Neurological:      Mental Status: He is alert and oriented to person, place, and time. Cranial Nerves: No cranial nerve deficit.    Psychiatric:         Speech: Speech normal.         Behavior: Behavior normal.         Procedure Otomicroscopy    An operating microscope was utilized to visualize the external auditory canals using a 4mm speculum. The external auditory canals are clear. The tympanic membrane is intact. Ossicles appear intact. No fluid visualized in the middle ear. Assessment and Plan     1. Otalgia of both ears  Patient symptomatology is most consistent with temporomandibular joint dysfunction triggering headaches. No obvious tenderness to palpation on exam today. Ears appear essentially normal.  Right tympanic membrane may be slightly retracted. No obvious fluid noted. I recommend CT of the temporal bone to further evaluate for potential causes of the ear pain. In the meantime, I recommend ibuprofen 800 mg 3 times a day for a few days. Recommend warm compress, soft diet. TMJ may be triggering migraine. There may be a sinus element 2. Will assess symptom improvement after regular ibuprofen usage. Will dictate further management after CT completed. - CT IAC POSTERIOR FOSSA WO CONTRAST; Future    2. Chronic nonintractable headache, unspecified headache type    - CT IAC POSTERIOR FOSSA WO CONTRAST; Future    3. Dizziness      4. Nasal obstruction      5. DNS (deviated nasal septum)  Patient does have issues with nasal obstruction and wanted to have a septal deviation and turban hypertrophy. We will discuss at follow-up. Return for CT. Portions of this note were dictated using Dragon.  There may be linguistic errors secondary to the use of this program.

## 2022-10-11 ENCOUNTER — OFFICE VISIT (OUTPATIENT)
Dept: FAMILY MEDICINE CLINIC | Age: 42
End: 2022-10-11
Payer: MEDICARE

## 2022-10-11 VITALS
WEIGHT: 254 LBS | HEART RATE: 86 BPM | BODY MASS INDEX: 34.4 KG/M2 | HEIGHT: 72 IN | SYSTOLIC BLOOD PRESSURE: 128 MMHG | DIASTOLIC BLOOD PRESSURE: 78 MMHG | OXYGEN SATURATION: 96 %

## 2022-10-11 DIAGNOSIS — F41.9 ANXIETY: Primary | ICD-10-CM

## 2022-10-11 DIAGNOSIS — E78.2 MIXED HYPERLIPIDEMIA: ICD-10-CM

## 2022-10-11 DIAGNOSIS — E23.0 HYPOGONADOTROPIC HYPOGONADISM (HCC): ICD-10-CM

## 2022-10-11 PROCEDURE — 99214 OFFICE O/P EST MOD 30 MIN: CPT | Performed by: FAMILY MEDICINE

## 2022-10-11 PROCEDURE — 4004F PT TOBACCO SCREEN RCVD TLK: CPT | Performed by: FAMILY MEDICINE

## 2022-10-11 PROCEDURE — G8417 CALC BMI ABV UP PARAM F/U: HCPCS | Performed by: FAMILY MEDICINE

## 2022-10-11 PROCEDURE — G8484 FLU IMMUNIZE NO ADMIN: HCPCS | Performed by: FAMILY MEDICINE

## 2022-10-11 PROCEDURE — G8427 DOCREV CUR MEDS BY ELIG CLIN: HCPCS | Performed by: FAMILY MEDICINE

## 2022-10-11 RX ORDER — TESTOSTERONE CYPIONATE 200 MG/ML
200 INJECTION INTRAMUSCULAR
Qty: 4 ML | Refills: 2 | Status: SHIPPED | OUTPATIENT
Start: 2022-10-11 | End: 2023-01-09

## 2022-10-11 RX ORDER — ATORVASTATIN CALCIUM 10 MG/1
10 TABLET, FILM COATED ORAL DAILY
Qty: 90 TABLET | Refills: 1 | Status: SHIPPED | OUTPATIENT
Start: 2022-10-11

## 2022-10-11 NOTE — PROGRESS NOTES
Radha Rodriguez (:  1980) is a 43 y.o. male,Established patient, here for evaluation of the following chief complaint(s):  3 Month Follow-Up (Med check & f/u on lab results from 22.)         ASSESSMENT/PLAN:  [facility] JESENIA was seen today for 3 month follow-up. Diagnoses and all orders for this visit:    Anxiety  Stable on valium. Will send rf when needed  Controlled Substances Monitoring: Periodic Controlled Substance Monitoring: Possible medication side effects, risk of tolerance/dependence & alternative treatments discussed., No signs of potential drug abuse or diversion identified. Marylu Parker MD)   Hyperlipidemia  -     atorvastatin (LIPITOR) 10 MG tablet; Take 1 tablet by mouth daily  Not well controlled but agrees to restart lipitor  Now that he's more active can hold vascepa and see if still needed since triglycerides weren't bad off med  Hypogonadotropic hypogonadism (HCC)  -     testosterone cypionate (DEPOTESTOTERONE CYPIONATE) 200 MG/ML injection; Inject 1 mL into the muscle every 7 days for 90 days. Symptoms not well controlled   Restarting im testosterone     No follow-ups on file. Subjective   SUBJECTIVE/OBJECTIVE:  HPI  Pt is a of 43 y.o. male comes in today with   Chief Complaint   Patient presents with    3 Month Follow-Up     Med check & f/u on lab results from 22. Doesn't take meds regularly  Last blood test wasn't on anything. Vitals:    10/11/22 1454   BP: 128/78   Site: Left Upper Arm   Position: Sitting   Cuff Size: Large Adult   Pulse: 86   SpO2: 96%   Weight: 254 lb (115.2 kg)   Height: 6' (1.829 m)      Review of Systems       Objective   Physical Exam         An electronic signature was used to authenticate this note.     --Marylu Parker MD

## 2022-10-18 ENCOUNTER — HOSPITAL ENCOUNTER (OUTPATIENT)
Dept: CT IMAGING | Age: 42
Discharge: HOME | End: 2022-10-18
Payer: MEDICARE

## 2022-10-18 DIAGNOSIS — H92.03 OTALGIA OF BOTH EARS: ICD-10-CM

## 2022-10-18 DIAGNOSIS — R51.9 CHRONIC NONINTRACTABLE HEADACHE, UNSPECIFIED HEADACHE TYPE: ICD-10-CM

## 2022-10-18 DIAGNOSIS — G89.29 CHRONIC NONINTRACTABLE HEADACHE, UNSPECIFIED HEADACHE TYPE: ICD-10-CM

## 2022-10-18 PROCEDURE — 70480 CT ORBIT/EAR/FOSSA W/O DYE: CPT

## 2022-10-20 ENCOUNTER — TELEPHONE (OUTPATIENT)
Dept: ENT CLINIC | Age: 42
End: 2022-10-20

## 2022-10-20 DIAGNOSIS — J32.2 CHRONIC ETHMOIDAL SINUSITIS: ICD-10-CM

## 2022-10-20 DIAGNOSIS — J30.9 ALLERGIC RHINITIS, UNSPECIFIED SEASONALITY, UNSPECIFIED TRIGGER: Primary | ICD-10-CM

## 2022-10-20 RX ORDER — FLUTICASONE PROPIONATE 50 MCG
2 SPRAY, SUSPENSION (ML) NASAL DAILY
Qty: 16 G | Refills: 5 | Status: SHIPPED | OUTPATIENT
Start: 2022-10-20 | End: 2022-11-19

## 2022-10-20 RX ORDER — CETIRIZINE HYDROCHLORIDE 10 MG/1
10 TABLET ORAL EVERY EVENING
Qty: 30 TABLET | Refills: 2 | Status: SHIPPED | OUTPATIENT
Start: 2022-10-20 | End: 2022-11-19

## 2022-10-20 RX ORDER — AZELASTINE 1 MG/ML
1 SPRAY, METERED NASAL 2 TIMES DAILY
Qty: 30 ML | Refills: 6 | Status: SHIPPED | OUTPATIENT
Start: 2022-10-20

## 2022-10-20 NOTE — TELEPHONE ENCOUNTER
----- Message from Dominique Barajas DO sent at 10/20/2022  9:06 AM EDT -----  CT scan shows essentially normal appearing ears. However, there is some chronic inflammation of the sinuses that is likely the trigger of the current symptoms. I suspect this is related to allergies. I sent prescriptions for 2 nasal sprays and an allergy pill to [facility]. I would like you to try the medications for one month and then follow up to see how the symptoms are doing. If symptoms persist, we may need to discuss further management of the sinuses.

## 2022-11-09 DIAGNOSIS — F41.9 ANXIETY: ICD-10-CM

## 2022-11-09 RX ORDER — DIAZEPAM 10 MG/1
TABLET ORAL
Qty: 90 TABLET | OUTPATIENT
Start: 2022-11-09

## 2022-11-09 RX ORDER — DIAZEPAM 10 MG/1
TABLET ORAL
Qty: 90 TABLET | Refills: 0 | Status: SHIPPED | OUTPATIENT
Start: 2022-11-09 | End: 2022-12-09

## 2022-11-09 NOTE — TELEPHONE ENCOUNTER
Medication:   Requested Prescriptions     Pending Prescriptions Disp Refills    diazePAM (VALIUM) 10 MG tablet [Pharmacy Med Name: DIAZEPAM 10 MG TABLET] 90 tablet      Sig: TAKE ONE TABLET BY MOUTH EVERY 6 HOURS AS NEEDED FOR ANXIETY     Last Filled:  8/29/2022    Last appt: 10/11/2022   Next appt: Visit date not found    Last OARRS:   RX Monitoring 10/11/2022   Attestation -   Periodic Controlled Substance Monitoring Possible medication side effects, risk of tolerance/dependence & alternative treatments discussed. ;No signs of potential drug abuse or diversion identified. Opioid Counseling: I discussed with the patient the potential side effects of opioids including but not limited to addiction, altered mental status, and depression. I stressed avoiding alcohol, benzodiazepines, muscle relaxants and sleep aids unless specifically okayed by a physician. The patient verbalized understanding of the proper use and possible adverse effects of opioids. All of the patient's questions and concerns were addressed. They were instructed to flush the remaining pills down the toilet if they did not need them for pain.

## 2022-11-22 ENCOUNTER — OFFICE VISIT (OUTPATIENT)
Dept: ENT CLINIC | Age: 42
End: 2022-11-22
Payer: MEDICARE

## 2022-11-22 VITALS
HEART RATE: 92 BPM | BODY MASS INDEX: 34.81 KG/M2 | SYSTOLIC BLOOD PRESSURE: 122 MMHG | WEIGHT: 257 LBS | HEIGHT: 72 IN | DIASTOLIC BLOOD PRESSURE: 82 MMHG

## 2022-11-22 DIAGNOSIS — J34.89 NASAL OBSTRUCTION: ICD-10-CM

## 2022-11-22 DIAGNOSIS — J32.0 CHRONIC MAXILLARY SINUSITIS: ICD-10-CM

## 2022-11-22 DIAGNOSIS — J30.9 ALLERGIC RHINITIS, UNSPECIFIED SEASONALITY, UNSPECIFIED TRIGGER: Primary | ICD-10-CM

## 2022-11-22 DIAGNOSIS — J34.2 DNS (DEVIATED NASAL SEPTUM): ICD-10-CM

## 2022-11-22 DIAGNOSIS — J32.2 CHRONIC ETHMOIDAL SINUSITIS: ICD-10-CM

## 2022-11-22 DIAGNOSIS — J32.1 CHRONIC FRONTAL SINUSITIS: ICD-10-CM

## 2022-11-22 DIAGNOSIS — H92.03 OTALGIA OF BOTH EARS: ICD-10-CM

## 2022-11-22 DIAGNOSIS — H69.83 DYSFUNCTION OF BOTH EUSTACHIAN TUBES: ICD-10-CM

## 2022-11-22 DIAGNOSIS — J34.3 NASAL TURBINATE HYPERTROPHY: ICD-10-CM

## 2022-11-22 PROCEDURE — G8417 CALC BMI ABV UP PARAM F/U: HCPCS | Performed by: OTOLARYNGOLOGY

## 2022-11-22 PROCEDURE — G8484 FLU IMMUNIZE NO ADMIN: HCPCS | Performed by: OTOLARYNGOLOGY

## 2022-11-22 PROCEDURE — 99214 OFFICE O/P EST MOD 30 MIN: CPT | Performed by: OTOLARYNGOLOGY

## 2022-11-22 PROCEDURE — G8427 DOCREV CUR MEDS BY ELIG CLIN: HCPCS | Performed by: OTOLARYNGOLOGY

## 2022-11-22 PROCEDURE — 4004F PT TOBACCO SCREEN RCVD TLK: CPT | Performed by: OTOLARYNGOLOGY

## 2022-11-22 ASSESSMENT — ENCOUNTER SYMPTOMS
STRIDOR: 0
SINUS PAIN: 1
SHORTNESS OF BREATH: 0
PHOTOPHOBIA: 0
CHOKING: 0
RHINORRHEA: 0
DIARRHEA: 0
TROUBLE SWALLOWING: 0
SORE THROAT: 0
EYE PAIN: 0
EYE REDNESS: 0
VOICE CHANGE: 0
NAUSEA: 0
COLOR CHANGE: 0
FACIAL SWELLING: 0
COUGH: 0
EYE ITCHING: 0
SINUS PRESSURE: 1

## 2022-11-22 NOTE — LETTER
[facility]    Surgery Schedule Request Form: 11/22/22  [address]                                     1 wk post op TBS      DATE OF SURGERY: 1/9/23    TIME OF SURGERY:  10:00 AM            CONF #: ____________________       Patient Information:    Patient name: Dinh Loera    YOB: 1980 Age/Sex:42 y.o./male    SS #:[id number]    Wt Readings from Last 1 Encounters:   11/22/22 257 lb (116.6 kg)       Telephone Information:   Mobile [phone]     Home [phone]     Surgeon & Procedure Information:     Lead surgeon: Ellen Williamson Co-Surgeon: diane  Phone: [phone] Fax: [phone]  PCP: Viky Block MD    Diagnosis: 1. Chronic maxillary sinusitis J32.0 2. Chronic ethmoid sinusitis J32.2 3. Chronic frontal sinusitis J32.1  4. Deviated nasal septum J34.2 5. Bilateral Nasal turban hypertrophy  J34.36. Eustachian tube dysfunction bilateral H69.83    Procedure name/CPT: Bilateral eustachian tube balloon dilation (26608), Bilateral Inferior Turbinate Reduction (00270-66), Bilateral Maxillary Antrostomy (79234-25), Bilateral Myringotomy with Pressure Equalizing Tubes (06665-84), Bilateral Total Ethmoidectomy (82035-35) and Septoplasty (56811)    Procedure length: 1 hour Anesthesia: General    Special Equipment: yes - sinus, nasal, eustachian tube balloon, ear tube/micro    Patient Status: SDS (OP)    Primary Payor Plan: Medicare  Member ID: Moore Sensor name: Marcos Goldstein Vaccinated? Yes    [] Implement attached clinical orders for patient.       Electronically signed by Anahi Ibarra DO on 11/22/2022 at 2:25 PM

## 2022-11-22 NOTE — PROGRESS NOTES
[facility]  [address] [address] [facility], [address] [place] [address], [address] [place] [address] Yanely  P: [phone]  F: [phone]       Patient     Mamadou Massey  1980    ChiefComplaint     Chief Complaint   Patient presents with    Follow-up     After using sinus medication, might of helped but not like he thought it would        History of Present Illness     Mamadou Massey is a pleasant 43 y.o. male here for 1 month follow-up for ear pain, sinusitis. He tried the ibuprofen which did not provide any significant improvement of symptoms. CT of the ear reveals normal well aerated ears. He did have chronic mucosal thickening of the left maxillary sinus, ethmoid sinuses and frontal sinuses bilaterally. He does experience nasal obstruction and congestion. CT also revealed turbinate hypertrophy and a septal deviation. He is interested in further intervention for his symptoms.     Past Medical History     Past Medical History:   Diagnosis Date    Abnormal thyroid blood test     Anxiety     Back pain     lower back pain    Depression     Elevated homocysteine     Fibromyalgia     GERD (gastroesophageal reflux disease)     Hypercholesteremia     Hypogonadotropic hypogonadism (HCC)     Neuropathy        Past Surgical History     Past Surgical History:   Procedure Laterality Date    EPIDIDYMECTOMY      left    EPIGASTRIC HERNIA REPAIR      HERNIA REPAIR  11/6/2013    LAPAROSCOPIC REPAIR RECURRENT INCISONAL HERNIA WITH MESH    KIDNEY SURGERY      ureter pinched shut on left, did not improve kidney function and left kidney was removed    NECK SURGERY      mass removed    OTHER SURGICAL HISTORY      spinal cord stimulator    [place] NJX DX/THER SBST INTRLMNR CRV/THRC W/IMG GDN N/A 9/11/2018    LEFT ABDOMINAL INTRATHECAL PAIN PUMP AND CATHETER INSERTION performed by Yarelis Bonilla MD at [facility]         Family History     Family History   Problem Relation Age of Onset    Thyroid Disease Mother High Cholesterol Mother     Diabetes Father         thinks he is, never diagnosed    Schizophrenia Father         paranoid       Social History     Social History     Socioeconomic History    Marital status:      Spouse name: [address]    Number of children: 2    Years of education: Not on file    Highest education level: Not on file   Occupational History    Occupation: disability     Comment: depression   Tobacco Use    Smoking status: Every Day     Packs/day: 2.00     Years: 15.00     Pack years: 30.00     Types: Cigarettes    Smokeless tobacco: Never    Tobacco comments:     pt denies counciling   Vaping Use    Vaping Use: Never used   Substance and Sexual Activity    Alcohol use: No     Alcohol/week: 0.0 standard drinks     Comment: Does not drink    Drug use: No    Sexual activity: Yes     Partners: Female   Other Topics Concern    Not on file   Social History Narrative    Previously worked in construction, on disability since 2007     Social Determinants of Health     Financial Resource Strain: Medium Risk    Difficulty of Paying Living Expenses: Somewhat hard   Food Insecurity: No Food Insecurity    Worried About Running Out of Food in the Last Year: Never true    Ran Out of Food in the Last Year: Never true   Transportation Needs: Not on file   Physical Activity: Not on file   Stress: Not on file   Social Connections: Not on file   Intimate Partner Violence: Not on file   Housing Stability: Not on file       Allergies     No Known Allergies    Medications     Current Outpatient Medications   Medication Sig Dispense Refill    diazePAM (VALIUM) 10 MG tablet TAKE ONE TABLET BY MOUTH EVERY 6 HOURS AS NEEDED FOR ANXIETY 90 tablet 0    azelastine (ASTELIN) 0.1 % nasal spray 1 spray by Nasal route 2 times daily Use in each nostril as directed 30 mL 6    fluticasone (FLONASE) 50 MCG/ACT nasal spray 2 sprays by Nasal route daily 16 g 5    atorvastatin (LIPITOR) 10 MG tablet Take 1 tablet by mouth daily 90 tablet 1    testosterone cypionate (DEPOTESTOTERONE CYPIONATE) 200 MG/ML injection Inject 1 mL into the muscle every 7 days for 90 days. 4 mL 2    diazePAM (VALIUM) 10 MG tablet       furosemide (LASIX) 20 MG tablet Take 1 tablet by mouth in the morning and 1 tablet before bedtime. 60 tablet 1    sertraline (ZOLOFT) 50 MG tablet Take 1 tablet by mouth in the morning. 90 tablet 1    Needle, Disp, (HYPODERMIC NEEDLE 00XE3-8/2\") 21G X 1-1/2\" MISC Draw up testosterone with 18 gauge and inject with 21 gauge 10 each 2    NEEDLE, DISP, 18 G 18G X 1-1/2\" MISC Draw up testosterone with 18 gauge and inject with 21 gauge 10 each 2    HYDROcodone-acetaminophen (NORCO) 7.5-325 MG per tablet       sildenafil (VIAGRA) 100 MG tablet Take 1 tablet by mouth as needed for Erectile Dysfunction 10 tablet 5    Cholecalciferol (VITAMIN D) 50 MCG (2000 UT) CAPS capsule Take 1 capsule by mouth daily       MORPHINE, PAIN PUMP REFILL CHARGE,       melatonin 3 MG TABS tablet Take 6 mg by mouth daily       No current facility-administered medications for this visit. Review of Systems     Review of Systems   Constitutional:  Negative for chills, fatigue and fever. HENT:  Positive for ear pain, postnasal drip, sinus pressure and sinus pain. Negative for congestion, ear discharge, facial swelling, hearing loss, nosebleeds, rhinorrhea, sneezing, sore throat, tinnitus, trouble swallowing and voice change. Eyes:  Negative for photophobia, pain, redness, itching and visual disturbance. Respiratory:  Negative for cough, choking, shortness of breath and stridor. Gastrointestinal:  Negative for diarrhea and nausea. Musculoskeletal:  Negative for neck pain and neck stiffness. Skin:  Negative for color change and rash. Neurological:  Positive for dizziness and headaches. Negative for facial asymmetry and light-headedness. Hematological:  Negative for adenopathy. Psychiatric/Behavioral:  Negative for agitation and confusion. PhysicalExam     Vitals:    11/22/22 1347   BP: 122/82   Pulse: 92       Physical Exam  Constitutional:       Appearance: He is well-developed. HENT:      Head: Normocephalic and atraumatic. Jaw: No trismus. Right Ear: Tympanic membrane, ear canal and external ear normal. No drainage. No middle ear effusion. Tympanic membrane is not perforated. Left Ear: Tympanic membrane, ear canal and external ear normal. No drainage. No middle ear effusion. Tympanic membrane is not perforated. Nose: Septal deviation present. No mucosal edema or rhinorrhea. Right Turbinates: Enlarged and swollen. Left Turbinates: Enlarged and swollen. Mouth/Throat:      Dentition: Normal dentition. Pharynx: Uvula midline. No oropharyngeal exudate. Eyes:      General: No scleral icterus. Right eye: No discharge. Left eye: No discharge. Pupils: Pupils are equal, round, and reactive to light. Neck:      Thyroid: No thyromegaly. Trachea: Phonation normal. No tracheal deviation. Pulmonary:      Effort: Pulmonary effort is normal. No respiratory distress. Breath sounds: No stridor. Musculoskeletal:      Cervical back: Neck supple. Lymphadenopathy:      Cervical: No cervical adenopathy. Skin:     General: Skin is warm and dry. Neurological:      Mental Status: He is alert and oriented to person, place, and time. Cranial Nerves: No cranial nerve deficit. Psychiatric:         Behavior: Behavior normal.         Procedure           Assessment and Plan     1. Allergic rhinitis, unspecified seasonality, unspecified trigger  Patient is experiencing persistent issues with ear pressure, ear fullness, sinus pressure and pain, headache, postnasal drainage, nasal congestion and obstruction despite 1 month of nasal steroid, nasal antihistamine and oral antihistamine.   CT revealed a well aerated ears and chronic mucosal thickening of the maxillary, ethmoid and frontal sinuses. He also revealed turban hypertrophy and a septal deviation. Given the patient's persistent symptoms and CT findings, I recommend endoscopic sinus surgery, septoplasty, turbinate reduction, eustachian tube dilation with placement of ear tubes. Risk, benefits alternatives of the procedure discussed. Risk include, but not limited to bleeding, infection, pain, septal perforation, septal hematoma, poor cosmetic outcome, damage to the orbit and blindness, damage to the skull base and CSF leak, synechia formation, epiphora, persistent tympanic membrane perforation, otorrhea. Patient understands and like to proceed. 2. Chronic ethmoidal sinusitis      3. Chronic maxillary sinusitis      4. Chronic frontal sinusitis      5. Otalgia of both ears      6. DNS (deviated nasal septum)      7. Nasal obstruction      8. Nasal turbinate hypertrophy      9. Dysfunction of both eustachian tubes      Return for 1 week post op. Portions of this note were dictated using Dragon.  There may be linguistic errors secondary to the use of this program.

## 2022-12-15 NOTE — TELEPHONE ENCOUNTER
Medication:   Requested Prescriptions     Pending Prescriptions Disp Refills    B-D 3CC LUER-SRAVANTHI SYR 89QL8-3/2 18G X 1-1/2\" 3 ML MISC [Pharmacy Med Name: BD 3 ML SYRINGE 67ST0-7/2\"]       Sig: USE 18G TO DRAW UP TESTOSTERONE AND THEN USE 21G TO INJECT EVERY 2 WEEKS        Last Filled:      Patient Phone Number: [phone] (home)     Last appt: 10/11/2022   Next appt: 1/4/2023    Last OARRS:   RX Monitoring 10/11/2022   Attestation -   Periodic Controlled Substance Monitoring Possible medication side effects, risk of tolerance/dependence & alternative treatments discussed. ;No signs of potential drug abuse or diversion identified.

## 2022-12-16 RX ORDER — SYRINGE WITH NEEDLE, 1 ML 25GX5/8"
SYRINGE, EMPTY DISPOSABLE MISCELLANEOUS
Qty: 10 EACH | Refills: 2 | Status: SHIPPED | OUTPATIENT
Start: 2022-12-16

## 2022-12-27 ENCOUNTER — TELEPHONE (OUTPATIENT)
Dept: ENT CLINIC | Age: 42
End: 2022-12-27

## 2022-12-27 NOTE — TELEPHONE ENCOUNTER
Medicare had denied -[phone] since patient was not try and fail treatments for 3 months. Letter of medical necessity can be submitted for review.  Surgery is on 1/16/23

## 2022-12-28 NOTE — TELEPHONE ENCOUNTER
I have tried at least 20 to get Dr. Fabio Hays NPI and Ptan number to work together and I never was able to succeed

## 2023-01-06 ENCOUNTER — TELEPHONE (OUTPATIENT)
Dept: ENT CLINIC | Age: 43
End: 2023-01-06

## 2023-01-10 NOTE — TELEPHONE ENCOUNTER
Per Dr. Mejia Roles we will have him come back into the office and document he tried nasal sprays for 3 months then resubmit his surgery at a later date, Livia Herman has been informed of this

## 2023-01-11 ENCOUNTER — OFFICE VISIT (OUTPATIENT)
Dept: FAMILY MEDICINE CLINIC | Age: 43
End: 2023-01-11

## 2023-01-11 VITALS
OXYGEN SATURATION: 96 % | WEIGHT: 252 LBS | HEART RATE: 82 BPM | DIASTOLIC BLOOD PRESSURE: 80 MMHG | SYSTOLIC BLOOD PRESSURE: 110 MMHG | BODY MASS INDEX: 34.18 KG/M2

## 2023-01-11 DIAGNOSIS — E23.0 HYPOGONADOTROPIC HYPOGONADISM (HCC): ICD-10-CM

## 2023-01-11 DIAGNOSIS — F33.1 MODERATE EPISODE OF RECURRENT MAJOR DEPRESSIVE DISORDER (HCC): ICD-10-CM

## 2023-01-11 DIAGNOSIS — F41.9 ANXIETY: ICD-10-CM

## 2023-01-11 RX ORDER — DIAZEPAM 10 MG/1
TABLET ORAL
Qty: 90 TABLET | Refills: 0 | Status: SHIPPED | OUTPATIENT
Start: 2023-01-11 | End: 2023-02-10

## 2023-01-11 RX ORDER — TESTOSTERONE CYPIONATE 200 MG/ML
200 INJECTION INTRAMUSCULAR
Qty: 4 ML | Refills: 2 | Status: SHIPPED | OUTPATIENT
Start: 2023-01-11 | End: 2023-04-11

## 2023-01-11 ASSESSMENT — PATIENT HEALTH QUESTIONNAIRE - PHQ9
SUM OF ALL RESPONSES TO PHQ QUESTIONS 1-9: 4
8. MOVING OR SPEAKING SO SLOWLY THAT OTHER PEOPLE COULD HAVE NOTICED. OR THE OPPOSITE, BEING SO FIGETY OR RESTLESS THAT YOU HAVE BEEN MOVING AROUND A LOT MORE THAN USUAL: 0
10. IF YOU CHECKED OFF ANY PROBLEMS, HOW DIFFICULT HAVE THESE PROBLEMS MADE IT FOR YOU TO DO YOUR WORK, TAKE CARE OF THINGS AT HOME, OR GET ALONG WITH OTHER PEOPLE: 1
2. FEELING DOWN, DEPRESSED OR HOPELESS: 0
7. TROUBLE CONCENTRATING ON THINGS, SUCH AS READING THE NEWSPAPER OR WATCHING TELEVISION: 0
SUM OF ALL RESPONSES TO PHQ QUESTIONS 1-9: 4
SUM OF ALL RESPONSES TO PHQ9 QUESTIONS 1 & 2: 0
6. FEELING BAD ABOUT YOURSELF - OR THAT YOU ARE A FAILURE OR HAVE LET YOURSELF OR YOUR FAMILY DOWN: 0
SUM OF ALL RESPONSES TO PHQ QUESTIONS 1-9: 4
9. THOUGHTS THAT YOU WOULD BE BETTER OFF DEAD, OR OF HURTING YOURSELF: 0
1. LITTLE INTEREST OR PLEASURE IN DOING THINGS: 0
3. TROUBLE FALLING OR STAYING ASLEEP: 0
4. FEELING TIRED OR HAVING LITTLE ENERGY: 2
SUM OF ALL RESPONSES TO PHQ QUESTIONS 1-9: 4
5. POOR APPETITE OR OVEREATING: 2

## 2023-01-11 NOTE — PROGRESS NOTES
Willow Givens (:  1980) is a 43 y.o. male,Established patient, here for evaluation of the following chief complaint(s):  Pre-op Exam         ASSESSMENT/PLAN:  Ralph Fernández was seen today for pre-op exam.    Diagnoses and all orders for this visit:    Anxiety  -     diazePAM (VALIUM) 10 MG tablet; TAKE ONE TABLET BY MOUTH EVERY 6 HOURS AS NEEDED FOR ANXIETY  Stable on valium  Controlled Substances Monitoring: Periodic Controlled Substance Monitoring: Possible medication side effects, risk of tolerance/dependence & alternative treatments discussed., No signs of potential drug abuse or diversion identified. Nick Murguia MD)   Moderate episode of recurrent major depressive disorder ([facility] [address]  Stable on zoloft  Hypogonadotropic hypogonadism (HCC)  -     testosterone cypionate (DEPOTESTOTERONE CYPIONATE) 200 MG/ML injection; Inject 1 mL into the muscle every 7 days for 90 days. Stable on testosterone   Other orders  -     sertraline (ZOLOFT) 50 MG tablet; Take 1 tablet by mouth daily       No follow-ups on file. Subjective   SUBJECTIVE/OBJECTIVE:  HPI  Pt is a of 43 y.o. male comes in today with   Chief Complaint   Patient presents with    Pre-op Exam     Does testosterone 2 weeks on, 1 week off  Anxiety stable on valium  Taking meds as prescribed.  No side effects   No Known Allergies    Current Outpatient Medications on File Prior to Visit   Medication Sig Dispense Refill    B-D 3CC LUER-SRAVANTHI SYR 24LJ7-4/2 18G X 1-1/2\" 3 ML MISC USE 18G TO DRAW UP TESTOSTERONE AND THEN USE 21G TO INJECT EVERY 2 WEEKS 10 each 2    azelastine (ASTELIN) 0.1 % nasal spray 1 spray by Nasal route 2 times daily Use in each nostril as directed 30 mL 6    fluticasone (FLONASE) 50 MCG/ACT nasal spray 2 sprays by Nasal route daily 16 g 5    atorvastatin (LIPITOR) 10 MG tablet Take 1 tablet by mouth daily 90 tablet 1    testosterone cypionate (DEPOTESTOTERONE CYPIONATE) 200 MG/ML injection Inject 1 mL into the muscle every 7 days for 90 days. 4 mL 2    diazePAM (VALIUM) 10 MG tablet       furosemide (LASIX) 20 MG tablet Take 1 tablet by mouth in the morning and 1 tablet before bedtime. 60 tablet 1    sertraline (ZOLOFT) 50 MG tablet Take 1 tablet by mouth in the morning. 90 tablet 1    Needle, Disp, (HYPODERMIC NEEDLE 63YA1-7/2\") 21G X 1-1/2\" MISC Draw up testosterone with 18 gauge and inject with 21 gauge 10 each 2    NEEDLE, DISP, 18 G 18G X 1-1/2\" MISC Draw up testosterone with 18 gauge and inject with 21 gauge 10 each 2    HYDROcodone-acetaminophen (NORCO) 7.5-325 MG per tablet       sildenafil (VIAGRA) 100 MG tablet Take 1 tablet by mouth as needed for Erectile Dysfunction 10 tablet 5    Cholecalciferol (VITAMIN D) 50 MCG (2000 UT) CAPS capsule Take 1 capsule by mouth daily       MORPHINE, PAIN PUMP REFILL CHARGE,       melatonin 3 MG TABS tablet Take 6 mg by mouth daily       No current facility-administered medications on file prior to visit.        Past Medical History:   Diagnosis Date    Abnormal thyroid blood test     Anxiety     Back pain     lower back pain    Depression     Elevated homocysteine     Fibromyalgia     GERD (gastroesophageal reflux disease)     Hypercholesteremia     Hypogonadotropic hypogonadism (HCC)     Neuropathy        Past Surgical History:   Procedure Laterality Date    EPIDIDYMECTOMY      left    EPIGASTRIC HERNIA REPAIR      HERNIA REPAIR  11/6/2013    LAPAROSCOPIC REPAIR RECURRENT INCISONAL HERNIA WITH MESH    KIDNEY SURGERY      ureter pinched shut on left, did not improve kidney function and left kidney was removed    NECK SURGERY      mass removed    OTHER SURGICAL HISTORY      spinal cord stimulator    [place] NJX DX/THER SBST INTRLMNR CRV/THRC W/IMG GDN N/A 9/11/2018    LEFT ABDOMINAL INTRATHECAL PAIN PUMP AND CATHETER INSERTION performed by Dania Reeder MD at [address] [facility] History     Socioeconomic History    Marital status:      Spouse name: Sincere Andres Number of children: 2   Occupational History    Occupation: disability     Comment: depression   Tobacco Use    Smoking status: Every Day     Packs/day: 2.00     Years: 15.00     Pack years: 30.00     Types: Cigarettes    Smokeless tobacco: Never    Tobacco comments:     pt denies counciling   Vaping Use    Vaping Use: Never used   Substance and Sexual Activity    Alcohol use: No     Alcohol/week: 0.0 standard drinks     Comment: Does not drink    Drug use: No    Sexual activity: Yes     Partners: Female   Social History Narrative    Previously worked in construction, on disability since 2007     Social Determinants of Health     Financial Resource Strain: Medium Risk    Difficulty of Paying Living Expenses: Somewhat hard   Food Insecurity: No Food Insecurity    Worried About Running Out of Food in the Last Year: Never true    Ran Out of Food in the Last Year: Never true       Social History     Substance and Sexual Activity   Drug Use No       Family History   Problem Relation Age of Onset    Thyroid Disease Mother     High Cholesterol Mother     Diabetes Father         thinks he is, never diagnosed    Schizophrenia Father         paranoid      Review of Systems       Objective   Physical Exam  Constitutional:       General: He is not in acute distress. Appearance: Normal appearance. He is well-developed. He is not diaphoretic. HENT:      Head: Normocephalic and atraumatic. Mouth/Throat:      Mouth: Mucous membranes are moist.      Pharynx: Oropharynx is clear. Eyes:      General: No scleral icterus. Neck:      Thyroid: No thyromegaly. Trachea: No tracheal deviation. Cardiovascular:      Rate and Rhythm: Normal rate and regular rhythm. Heart sounds: Normal heart sounds. No murmur heard. Pulmonary:      Effort: Pulmonary effort is normal.      Breath sounds: Normal breath sounds. No wheezing or rales. Musculoskeletal:      Cervical back: Normal range of motion and neck supple. Lymphadenopathy:      Head:      Right side of head: No submental or submandibular adenopathy. Left side of head: No submental or submandibular adenopathy. Cervical: No cervical adenopathy. Skin:     General: Skin is warm and dry. Neurological:      Mental Status: He is alert and oriented to person, place, and time. Cranial Nerves: No cranial nerve deficit. Psychiatric:         Behavior: Behavior normal.         Thought Content: Thought content normal.         Judgment: Judgment normal.            An electronic signature was used to authenticate this note.     --Magdalena Jesus MD

## 2023-02-08 ENCOUNTER — OFFICE VISIT (OUTPATIENT)
Dept: ENT CLINIC | Age: 43
End: 2023-02-08
Payer: MEDICARE

## 2023-02-08 VITALS
TEMPERATURE: 98.1 F | HEART RATE: 102 BPM | SYSTOLIC BLOOD PRESSURE: 118 MMHG | DIASTOLIC BLOOD PRESSURE: 75 MMHG | OXYGEN SATURATION: 94 %

## 2023-02-08 DIAGNOSIS — H92.03 OTALGIA OF BOTH EARS: ICD-10-CM

## 2023-02-08 DIAGNOSIS — J30.9 ALLERGIC RHINITIS, UNSPECIFIED SEASONALITY, UNSPECIFIED TRIGGER: ICD-10-CM

## 2023-02-08 DIAGNOSIS — J34.89 NASAL OBSTRUCTION: ICD-10-CM

## 2023-02-08 DIAGNOSIS — H69.83 DYSFUNCTION OF BOTH EUSTACHIAN TUBES: ICD-10-CM

## 2023-02-08 DIAGNOSIS — J34.2 DNS (DEVIATED NASAL SEPTUM): ICD-10-CM

## 2023-02-08 DIAGNOSIS — J32.1 CHRONIC FRONTAL SINUSITIS: Primary | ICD-10-CM

## 2023-02-08 DIAGNOSIS — J34.3 NASAL TURBINATE HYPERTROPHY: ICD-10-CM

## 2023-02-08 DIAGNOSIS — J32.2 CHRONIC ETHMOIDAL SINUSITIS: ICD-10-CM

## 2023-02-08 DIAGNOSIS — J32.0 CHRONIC MAXILLARY SINUSITIS: ICD-10-CM

## 2023-02-08 PROCEDURE — G8427 DOCREV CUR MEDS BY ELIG CLIN: HCPCS | Performed by: OTOLARYNGOLOGY

## 2023-02-08 PROCEDURE — 99214 OFFICE O/P EST MOD 30 MIN: CPT | Performed by: OTOLARYNGOLOGY

## 2023-02-08 PROCEDURE — 4004F PT TOBACCO SCREEN RCVD TLK: CPT | Performed by: OTOLARYNGOLOGY

## 2023-02-08 PROCEDURE — G8484 FLU IMMUNIZE NO ADMIN: HCPCS | Performed by: OTOLARYNGOLOGY

## 2023-02-08 PROCEDURE — G8417 CALC BMI ABV UP PARAM F/U: HCPCS | Performed by: OTOLARYNGOLOGY

## 2023-02-08 ASSESSMENT — ENCOUNTER SYMPTOMS
COLOR CHANGE: 0
SHORTNESS OF BREATH: 0
COUGH: 0
EYE REDNESS: 0
SINUS PRESSURE: 1
EYE ITCHING: 0
RHINORRHEA: 0
STRIDOR: 0
TROUBLE SWALLOWING: 0
SINUS PAIN: 1
VOICE CHANGE: 0
FACIAL SWELLING: 0
CHOKING: 0
PHOTOPHOBIA: 0
SORE THROAT: 0
DIARRHEA: 0
EYE PAIN: 0
NAUSEA: 0

## 2023-02-08 NOTE — PROGRESS NOTES
[facility]  [address], [address] [facility]  [place], [address]  P: [phone]  F: [phone]       Patient     Kurtis Murphy  1980    ChiefComplaint     Chief Complaint   Patient presents with    Follow-up     Patient is here today because he still is having pressure in both ears but is dominating his right ear     Other     He still is having a hard time breathing out of his nose       History of Present Illness     Kurtis Murphy is a pleasant 43 y.o. male here for follow-up for chronic sinusitis, nasal obstruction, eustachian tube dysfunction. Patient has now completed 3 months of nasal steroid spray, oral antihistamine, nasal antihistamine. Continues to experience symptoms of nasal obstruction, sinus pressure, postnasal drainage, ear pressure. This is triggering headaches. Symptoms have even gotten worse over the past couple months.     Past Medical History     Past Medical History:   Diagnosis Date    Abnormal thyroid blood test     Anxiety     Back pain     lower back pain    Depression     Elevated homocysteine     Fibromyalgia     GERD (gastroesophageal reflux disease)     Hypercholesteremia     Hypogonadotropic hypogonadism (HCC)     Neuropathy        Past Surgical History     Past Surgical History:   Procedure Laterality Date    EPIDIDYMECTOMY      left    EPIGASTRIC HERNIA REPAIR      HERNIA REPAIR  11/6/2013    LAPAROSCOPIC REPAIR RECURRENT INCISONAL HERNIA WITH MESH    KIDNEY SURGERY      ureter pinched shut on left, did not improve kidney function and left kidney was removed    NECK SURGERY      mass removed    OTHER SURGICAL HISTORY      spinal cord stimulator    MD NJX DX/THER SBST INTRLMNR CRV/THRC W/IMG GDN N/A 9/11/2018    LEFT ABDOMINAL INTRATHECAL PAIN PUMP AND CATHETER INSERTION performed by Bryant Reyna MD at [facility]         Family History     Family History   Problem Relation Age of Onset    Thyroid Disease Mother High Cholesterol Mother     Diabetes Father         thinks he is, never diagnosed    Schizophrenia Father         paranoid       Social History     Social History     Socioeconomic History    Marital status:      Spouse name: Eduar Bernal    Number of children: 2    Years of education: Not on file    Highest education level: Not on file   Occupational History    Occupation: disability     Comment: depression   Tobacco Use    Smoking status: Every Day     Packs/day: 2.00     Years: 15.00     Pack years: 30.00     Types: Cigarettes    Smokeless tobacco: Never    Tobacco comments:     pt denies counciling   Vaping Use    Vaping Use: Never used   Substance and Sexual Activity    Alcohol use: No     Alcohol/week: 0.0 standard drinks     Comment: Does not drink    Drug use: No    Sexual activity: Yes     Partners: Female   Other Topics Concern    Not on file   Social History Narrative    Previously worked in construction, on disability since 2007     Social Determinants of Health     Financial Resource Strain: Medium Risk    Difficulty of Paying Living Expenses: Somewhat hard   Food Insecurity: No Food Insecurity    Worried About [address] [place] [address] [employer] in the Last Year: Never true    Ran Out of Food in the Last Year: Never true   Transportation Needs: Not on file   Physical Activity: Not on file   Stress: Not on file   Social Connections: Not on file   Intimate Partner Violence: Not on file   Housing Stability: Not on file       Allergies     No Known Allergies    Medications     Current Outpatient Medications   Medication Sig Dispense Refill    diazePAM (VALIUM) 10 MG tablet TAKE ONE TABLET BY MOUTH EVERY 6 HOURS AS NEEDED FOR ANXIETY 90 tablet 0    testosterone cypionate (DEPOTESTOTERONE CYPIONATE) 200 MG/ML injection Inject 1 mL into the muscle every 7 days for 90 days.  4 mL 2    sertraline (ZOLOFT) 50 MG tablet Take 1 tablet by mouth daily 90 tablet 1    B-D 3CC LUER-SRAVANTHI SYR 62NP1-3/2 18G X 1-1/2\" 3 ML MISC USE 18G TO DRAW UP TESTOSTERONE AND THEN USE 21G TO INJECT EVERY 2 WEEKS 10 each 2    azelastine (ASTELIN) 0.1 % nasal spray 1 spray by Nasal route 2 times daily Use in each nostril as directed 30 mL 6    atorvastatin (LIPITOR) 10 MG tablet Take 1 tablet by mouth daily 90 tablet 1    furosemide (LASIX) 20 MG tablet Take 1 tablet by mouth in the morning and 1 tablet before bedtime. 60 tablet 1    Needle, Disp, (HYPODERMIC NEEDLE 39KB9-5/2\") 21G X 1-1/2\" MISC Draw up testosterone with 18 gauge and inject with 21 gauge 10 each 2    NEEDLE, DISP, 18 G 18G X 1-1/2\" MISC Draw up testosterone with 18 gauge and inject with 21 gauge 10 each 2    HYDROcodone-acetaminophen (NORCO) 7.5-325 MG per tablet       sildenafil (VIAGRA) 100 MG tablet Take 1 tablet by mouth as needed for Erectile Dysfunction 10 tablet 5    Cholecalciferol (VITAMIN D) 50 MCG (2000 UT) CAPS capsule Take 1 capsule by mouth daily       MORPHINE, PAIN PUMP REFILL CHARGE,       melatonin 3 MG TABS tablet Take 6 mg by mouth daily       No current facility-administered medications for this visit. Review of Systems     Review of Systems   Constitutional:  Negative for chills, fatigue and fever. HENT:  Positive for congestion, ear pain, postnasal drip, sinus pressure, sinus pain and sneezing. Negative for ear discharge, facial swelling, hearing loss, nosebleeds, rhinorrhea, sore throat, tinnitus, trouble swallowing and voice change. Eyes:  Negative for photophobia, pain, redness, itching and visual disturbance. Respiratory:  Negative for cough, choking, shortness of breath and stridor. Gastrointestinal:  Negative for diarrhea and nausea. Musculoskeletal:  Negative for neck pain and neck stiffness. Skin:  Negative for color change and rash. Neurological:  Negative for dizziness, facial asymmetry and light-headedness. Hematological:  Negative for adenopathy. Psychiatric/Behavioral:  Negative for agitation and confusion.         PhysicalExam Vitals:    02/08/23 1354   BP: 118/75   Pulse: (!) 102   Temp: 98.1 °F (36.7 °C)   SpO2: 94%       Physical Exam  Constitutional:       Appearance: He is well-developed. HENT:      Head: Normocephalic and atraumatic. Jaw: No trismus. Right Ear: Tympanic membrane, ear canal and external ear normal. No drainage. No middle ear effusion. Tympanic membrane is not perforated. Left Ear: Tympanic membrane, ear canal and external ear normal. No drainage. No middle ear effusion. Tympanic membrane is not perforated. Nose: Septal deviation, mucosal edema, congestion and rhinorrhea present. Rhinorrhea is clear. Right Turbinates: Enlarged and swollen. Left Turbinates: Enlarged and swollen. Mouth/Throat:      Dentition: Normal dentition. Pharynx: Uvula midline. No oropharyngeal exudate. Eyes:      General: No scleral icterus. Right eye: No discharge. Left eye: No discharge. Pupils: Pupils are equal, round, and reactive to light. Neck:      Thyroid: No thyromegaly. Trachea: Phonation normal. No tracheal deviation. Pulmonary:      Effort: Pulmonary effort is normal. No respiratory distress. Breath sounds: No stridor. Musculoskeletal:      Cervical back: Neck supple. Lymphadenopathy:      Cervical: No cervical adenopathy. Skin:     General: Skin is warm and dry. Neurological:      Mental Status: He is alert and oriented to person, place, and time. Cranial Nerves: No cranial nerve deficit. Psychiatric:         Behavior: Behavior normal.         Procedure           Assessment and Plan     1. Chronic frontal sinusitis  The patient continues to experience symptoms of nasal obstruction, sinus pressure and pain, postnasal drainage, ear pressure despite 3 months of nasal steroid, oral antihistamine, nasal antihistamine.   He is interested in proceeding with septoplasty, turbinate reduction, sinus surgery, eustachian tube balloon dilation and PE tube placement. Risks and benefits of the procedure discussed once again. Risk include, but not limited to bleeding, infection, pain, septal perforation, septal hematoma, poor cosmetic outcome, damage to the orbit and blindness, damage to the skull base and CSF leak, epiphora, synechia formation, otorrhea. Patient understands would like to proceed. 2. Chronic maxillary sinusitis      3. Chronic ethmoidal sinusitis      4. Allergic rhinitis, unspecified seasonality, unspecified trigger      5. Otalgia of both ears      6. DNS (deviated nasal septum)      7. Nasal obstruction      8. Nasal turbinate hypertrophy      9. Dysfunction of both eustachian tubes      Return for 1 week post op. [ ] Review/order radiology tests   [ ] Independent interpretation of diagnostic test by another provider  [ ] Discussed case with another provider  [ ] High risk of loss of major body function  [ ] Elective major surgery with risk factors    Portions of this note were dictated using Dragon.  There may be linguistic errors secondary to the use of this program.

## 2023-02-08 NOTE — LETTER
[facility]    Surgery Schedule Request Form: 02/08/23  [address] Garza [place]      DATE OF SURGERY: ***    TIME OF SURGERY:  ***            CONF #: ____________________       Patient Information:    Patient name: Spencer Coello    YOB: 1980 Age/Sex:42 y.o./male    SS #:[id number]    Wt Readings from Last 1 Encounters:   01/11/23 252 lb (114.3 kg)       Telephone Information:   Mobile [phone]     Home [phone]     Surgeon & Procedure Information:     Lead surgeon: Radha Alatorre Co-Surgeon: diane  Phone: [phone] Fax: [phone]  PCP: Risa Casiano MD    Diagnosis: 1. Nasal obstruction 2. Deviated nasal septum 3. Nasal turbinate hypertrophy 4. Chronic maxillary sinusitis 5. Chronic ethmoid sinusitis 6. Chronic frontal sinusitis 7. Eustachian tube dysfunction    Procedure name/CPT: Bilateral eustachian tube balloon dilation (28247), Bilateral Inferior Turbinate Reduction (68482-14), Bilateral Maxillary Antrostomy (42312-04), Bilateral Myringotomy with Pressure Equalizing Tubes (04291-64), Bilateral Total Ethmoidectomy (36327-59) and Septoplasty (24276)    Postop CPT: Nasal endoscopy with debridement bilateral x2 (44152-11 x2)    Procedure length: 1 hour Anesthesia: General    Special Equipment: yes -sinus and nasal tray, eustachian tube balloon, microscope    Patient Status: SDS (OP)    Primary Payor Plan: ***  Member ID: ***   Subscriber name: ***    COVID Vaccinated? {YES/NO:28365}    [] Implement attached clinical orders for patient.       Electronically signed by Candace Vicente DO on 2/8/2023 at 2:04 PM

## 2023-02-14 ENCOUNTER — TELEPHONE (OUTPATIENT)
Dept: ENT CLINIC | Age: 43
End: 2023-02-14

## 2023-02-16 DIAGNOSIS — N52.9 ERECTILE DYSFUNCTION, UNSPECIFIED ERECTILE DYSFUNCTION TYPE: Primary | ICD-10-CM

## 2023-02-16 RX ORDER — SILDENAFIL 100 MG/1
100 TABLET, FILM COATED ORAL PRN
Qty: 10 TABLET | Refills: 5 | Status: SHIPPED | OUTPATIENT
Start: 2023-02-16

## 2023-02-16 NOTE — TELEPHONE ENCOUNTER
Medication:   Requested Prescriptions     Pending Prescriptions Disp Refills    sildenafil (VIAGRA) 100 MG tablet 10 tablet 5     Sig: Take 1 tablet by mouth as needed for Erectile Dysfunction        Last Filled: 5/29/2020   Last appt: 1/11/2023   Next appt: NONE

## 2023-02-16 NOTE — TELEPHONE ENCOUNTER
Viagra 100 mg  [facility]    Patient called in requesting a refill of this medication. He would like to check and make sure that this medication was safe for him to take.

## 2023-07-11 RX ORDER — PANTOPRAZOLE SODIUM 40 MG/1
40 TABLET, DELAYED RELEASE ORAL
Qty: 30 TABLET | Refills: 2 | Status: SHIPPED | OUTPATIENT
Start: 2023-07-11

## 2023-07-14 DIAGNOSIS — E78.2 MIXED HYPERLIPIDEMIA: ICD-10-CM

## 2023-07-14 DIAGNOSIS — E23.0 HYPOGONADOTROPIC HYPOGONADISM (HCC): ICD-10-CM

## 2023-07-14 DIAGNOSIS — R73.03 PREDIABETES: ICD-10-CM

## 2023-07-14 DIAGNOSIS — R53.83 FATIGUE, UNSPECIFIED TYPE: ICD-10-CM

## 2023-07-14 LAB
ALBUMIN SERPL-MCNC: 4.7 G/DL (ref 3.4–5)
ALBUMIN/GLOB SERPL: 2.4 {RATIO} (ref 1.1–2.2)
ALP SERPL-CCNC: 46 U/L (ref 40–129)
ALT SERPL-CCNC: 14 U/L (ref 10–40)
ANION GAP SERPL CALCULATED.3IONS-SCNC: 13 MMOL/L (ref 3–16)
AST SERPL-CCNC: 16 U/L (ref 15–37)
BILIRUB SERPL-MCNC: 0.4 MG/DL (ref 0–1)
BUN SERPL-MCNC: 13 MG/DL (ref 7–20)
CALCIUM SERPL-MCNC: 9.7 MG/DL (ref 8.3–10.6)
CHLORIDE SERPL-SCNC: 103 MMOL/L (ref 99–110)
CHOLEST SERPL-MCNC: 212 MG/DL (ref 0–199)
CO2 SERPL-SCNC: 26 MMOL/L (ref 21–32)
CREAT SERPL-MCNC: 1.3 MG/DL (ref 0.9–1.3)
DEPRECATED RDW RBC AUTO: 14 % (ref 12.4–15.4)
GFR SERPLBLD CREATININE-BSD FMLA CKD-EPI: >60 ML/MIN/{1.73_M2}
GLUCOSE SERPL-MCNC: 90 MG/DL (ref 70–99)
HCT VFR BLD AUTO: 49.5 % (ref 40.5–52.5)
HDLC SERPL-MCNC: 27 MG/DL (ref 40–60)
HGB BLD-MCNC: 16.8 G/DL (ref 13.5–17.5)
LDLC SERPL CALC-MCNC: 151 MG/DL
MCH RBC QN AUTO: 29.9 PG (ref 26–34)
MCHC RBC AUTO-ENTMCNC: 34 G/DL (ref 31–36)
MCV RBC AUTO: 87.8 FL (ref 80–100)
PLATELET # BLD AUTO: 177 K/UL (ref 135–450)
PMV BLD AUTO: 9.4 FL (ref 5–10.5)
POTASSIUM SERPL-SCNC: 4.5 MMOL/L (ref 3.5–5.1)
PROT SERPL-MCNC: 6.7 G/DL (ref 6.4–8.2)
RBC # BLD AUTO: 5.63 M/UL (ref 4.2–5.9)
SODIUM SERPL-SCNC: 142 MMOL/L (ref 136–145)
TRIGL SERPL-MCNC: 172 MG/DL (ref 0–150)
TSH SERPL DL<=0.005 MIU/L-ACNC: 0.28 UIU/ML (ref 0.27–4.2)
VLDLC SERPL CALC-MCNC: 34 MG/DL
WBC # BLD AUTO: 7.5 K/UL (ref 4–11)

## 2023-07-15 LAB
EST. AVERAGE GLUCOSE BLD GHB EST-MCNC: 105.4 MG/DL
HBA1C MFR BLD: 5.3 %

## 2023-07-17 DIAGNOSIS — E23.0 HYPOGONADOTROPIC HYPOGONADISM (HCC): ICD-10-CM

## 2023-07-17 RX ORDER — TESTOSTERONE CYPIONATE 200 MG/ML
200 INJECTION, SOLUTION INTRAMUSCULAR
Qty: 4 ML | Refills: 2 | Status: SHIPPED | OUTPATIENT
Start: 2023-07-17 | End: 2023-10-15

## 2023-07-17 NOTE — TELEPHONE ENCOUNTER
----- Message from Tessa [place] sent at 7/14/2023  4:03 PM EDT -----  Subject: Refill Request    QUESTIONS  Name of Medication? testosterone cypionate (DEPOTESTOTERONE CYPIONATE) 200 MG/ML injection  Patient-reported dosage and instructions? 200mg/ml injection, Inject 1 mL   into the muscle every 7 days for 90 days. How many days do you have left? 0  [facility]? [facility] [id number]  Pharmacy phone number (if available)? [phone]  Additional Information for Provider? Patient is all out of testosterone cypionate. He had his labs completed this morning and would like to know if we can call his medication in right away. His energy levels are down and he was due to take an injection today. Please contact the patient once the prescription has be sent over. -----------------------------------------------------------------------------------------  CALL BACK INFO  What is the best way for the office to contact you? OK to leave message on voicemail  Preferred Call Back Phone Number? [phone]  -----------------------------------------------------------------------------------------  SCRIPT ANSWERS  Relationship to Patient?  Self

## 2023-07-17 NOTE — TELEPHONE ENCOUNTER
Medication:   Requested Prescriptions     Pending Prescriptions Disp Refills    testosterone cypionate (DEPOTESTOTERONE CYPIONATE) 200 MG/ML injection 4 mL 2     Sig: Inject 1 mL into the muscle every 7 days for 90 days. Last Filled: 1/11/2023   Last appt: 4/11/2023   Next appt: 8/2/2023  Last OARRS:   RX Monitoring 4/11/2023   Attestation -   Periodic Controlled Substance Monitoring Possible medication side effects, risk of tolerance/dependence & alternative treatments discussed. ;No signs of potential drug abuse or diversion identified.

## 2023-07-18 LAB
SHBG SERPL-SCNC: 15 NMOL/L (ref 11–80)
TESTOST FREE SERPL-MCNC: 20.3 PG/ML (ref 47–244)
TESTOST SERPL-MCNC: 76 NG/DL (ref 220–1000)

## 2023-08-02 ENCOUNTER — OFFICE VISIT (OUTPATIENT)
Dept: FAMILY MEDICINE CLINIC | Age: 43
End: 2023-08-02

## 2023-08-02 VITALS
WEIGHT: 252 LBS | OXYGEN SATURATION: 95 % | HEART RATE: 85 BPM | SYSTOLIC BLOOD PRESSURE: 119 MMHG | DIASTOLIC BLOOD PRESSURE: 68 MMHG | HEIGHT: 71 IN | TEMPERATURE: 96.9 F | BODY MASS INDEX: 35.28 KG/M2

## 2023-08-02 DIAGNOSIS — E23.0 HYPOGONADOTROPIC HYPOGONADISM (HCC): ICD-10-CM

## 2023-08-02 DIAGNOSIS — F41.9 ANXIETY: ICD-10-CM

## 2023-08-02 DIAGNOSIS — R10.13 EPIGASTRIC PAIN: ICD-10-CM

## 2023-08-02 DIAGNOSIS — E66.01 SEVERE OBESITY (BMI 35.0-39.9) WITH COMORBIDITY (HCC): ICD-10-CM

## 2023-08-02 DIAGNOSIS — R06.83 SNORING: Primary | ICD-10-CM

## 2023-08-02 RX ORDER — PANTOPRAZOLE SODIUM 40 MG/1
40 TABLET, DELAYED RELEASE ORAL
Qty: 30 TABLET | Refills: 2 | Status: SHIPPED | OUTPATIENT
Start: 2023-08-02

## 2023-08-02 RX ORDER — ROSUVASTATIN CALCIUM 5 MG/1
5 TABLET, COATED ORAL DAILY
Qty: 90 TABLET | Refills: 1 | Status: SHIPPED | OUTPATIENT
Start: 2023-08-02

## 2023-08-02 RX ORDER — DIAZEPAM 10 MG/1
TABLET ORAL
Qty: 90 TABLET | Refills: 2 | Status: SHIPPED | OUTPATIENT
Start: 2023-08-02 | End: 2023-09-01

## 2023-08-02 RX ORDER — FAMOTIDINE 40 MG/1
40 TABLET, FILM COATED ORAL EVERY EVENING
Qty: 90 TABLET | Refills: 0 | Status: SHIPPED | OUTPATIENT
Start: 2023-08-02

## 2023-09-13 NOTE — TELEPHONE ENCOUNTER
Medication:   Requested Prescriptions     Pending Prescriptions Disp Refills    NEEDLE, DISP, 21 G (BD DISP NEEDLES) 21G X 1-1/2\" Pamela Mcconnell [Pharmacy Med Name: BD NEEDLE 21G X 1.5 IN] 12 each 0     Sig: DRAW UP TESTOSTERONE WITH 18 GUAGE AND INJECT WITH 21 GUAGE    famotidine (PEPCID) 40 MG tablet [Pharmacy Med Name: FAMOTIDINE 40 MG TABLET] 90 tablet 0     Sig: TAKE ONE TABLET BY MOUTH EVERY EVENING        Last Filled:      Patient Phone Number: [phone] (home)     Last appt: 8/2/2023   Next appt: 11/6/2023    Last OARRS:       8/2/2023     7:55 PM   RX Monitoring   Periodic Controlled Substance Monitoring Possible medication side effects, risk of tolerance/dependence & alternative treatments discussed. ;No signs of potential drug abuse or diversion identified.

## 2023-09-14 RX ORDER — FAMOTIDINE 40 MG/1
40 TABLET, FILM COATED ORAL
Qty: 90 TABLET | Refills: 0 | Status: SHIPPED | OUTPATIENT
Start: 2023-09-14

## 2023-09-14 RX ORDER — NEEDLES, DISPOSABLE 25GX5/8"
NEEDLE, DISPOSABLE MISCELLANEOUS
Qty: 12 EACH | Refills: 0 | Status: SHIPPED | OUTPATIENT
Start: 2023-09-14

## 2023-09-29 ENCOUNTER — HOSPITAL ENCOUNTER (OUTPATIENT)
Dept: ULTRASOUND IMAGING | Age: 43
Discharge: HOME | End: 2023-09-29
Attending: INTERNAL MEDICINE
Payer: MEDICARE

## 2023-09-29 DIAGNOSIS — R10.13 EPIGASTRIC ABDOMINAL PAIN: ICD-10-CM

## 2023-09-29 PROCEDURE — 76705 ECHO EXAM OF ABDOMEN: CPT

## 2023-10-03 RX ORDER — DIAZEPAM 10 MG/1
10 TABLET ORAL 2 TIMES DAILY
COMMUNITY

## 2023-10-03 NOTE — PROGRESS NOTES
Patient _X__ reached   _____not reached-preop instructions left on voice mail_____________      DATE___10/12/23_____ TIME___1030_____ARRIVAL__0900  FEC_______      Nothing to eat or drink after midnight the night before,except for what the prep instructions call for. If you do not have the instructions or do not understand them please contact your doctors office. Follow any instructions your doctors office has given you including what medications to take the AM of your procedure and which ones to hold. You may use your inhalers - bring rescue inhalers with you DOS. If you take a long acting insulin the veronica prior please cut the dose in half and take no diabetic medications that AM.Follow specific doctors office instructions regarding blood thinners and if they want you to hold and for how long. If you are on a blood thinner and have no instructions please contact the office and ask. Dress comfortably,bring your insurance card,picture ID,and a complete list of medications, including supplements. You must have a responsible adult to stay with you during the procedure,drive you home and stay with you. [facility] phone number [phone] for any questions. OTHER INSTRUCTIONS(if applicable)____none_____________________________________________________        VISITOR POLICY(subject to change)    Current visitor policy is 2 visitors per patient. No children allowed. Mask at discretion of facility. Visiting hours are 8a-8p. Overnight visitors will be at the discretion of the nurse. All policies are subject to change.

## 2023-10-12 ENCOUNTER — ANESTHESIA EVENT (OUTPATIENT)
Dept: ENDOSCOPY | Age: 43
End: 2023-10-12
Payer: MEDICARE

## 2023-10-12 ENCOUNTER — HOSPITAL ENCOUNTER (OUTPATIENT)
Age: 43
Setting detail: OUTPATIENT SURGERY
Discharge: HOME | End: 2023-10-12
Attending: INTERNAL MEDICINE | Admitting: INTERNAL MEDICINE
Payer: MEDICARE

## 2023-10-12 ENCOUNTER — ANESTHESIA (OUTPATIENT)
Dept: ENDOSCOPY | Age: 43
End: 2023-10-12
Payer: MEDICARE

## 2023-10-12 VITALS
WEIGHT: 250 LBS | TEMPERATURE: 97.1 F | DIASTOLIC BLOOD PRESSURE: 76 MMHG | RESPIRATION RATE: 16 BRPM | BODY MASS INDEX: 35 KG/M2 | HEART RATE: 76 BPM | HEIGHT: 71 IN | OXYGEN SATURATION: 96 % | SYSTOLIC BLOOD PRESSURE: 107 MMHG

## 2023-10-12 DIAGNOSIS — R10.13 EPIGASTRIC ABDOMINAL PAIN: ICD-10-CM

## 2023-10-12 DIAGNOSIS — R19.5 DARK STOOLS: ICD-10-CM

## 2023-10-12 PROCEDURE — 88342 IMHCHEM/IMCYTCHM 1ST ANTB: CPT

## 2023-10-12 PROCEDURE — 2580000003 HC RX 258: Performed by: ANESTHESIOLOGY

## 2023-10-12 PROCEDURE — 3609012400 HC EGD TRANSORAL BIOPSY SINGLE/MULTIPLE: Performed by: INTERNAL MEDICINE

## 2023-10-12 PROCEDURE — 2709999900 HC NON-CHARGEABLE SUPPLY: Performed by: INTERNAL MEDICINE

## 2023-10-12 PROCEDURE — 2500000003 HC RX 250 WO HCPCS: Performed by: NURSE ANESTHETIST, CERTIFIED REGISTERED

## 2023-10-12 PROCEDURE — 3700000000 HC ANESTHESIA ATTENDED CARE: Performed by: INTERNAL MEDICINE

## 2023-10-12 PROCEDURE — 7100000011 HC PHASE II RECOVERY - ADDTL 15 MIN: Performed by: INTERNAL MEDICINE

## 2023-10-12 PROCEDURE — 6360000002 HC RX W HCPCS: Performed by: NURSE ANESTHETIST, CERTIFIED REGISTERED

## 2023-10-12 PROCEDURE — 88305 TISSUE EXAM BY PATHOLOGIST: CPT

## 2023-10-12 PROCEDURE — 7100000010 HC PHASE II RECOVERY - FIRST 15 MIN: Performed by: INTERNAL MEDICINE

## 2023-10-12 RX ORDER — SODIUM CHLORIDE 9 MG/ML
INJECTION, SOLUTION INTRAVENOUS CONTINUOUS
Status: DISCONTINUED | OUTPATIENT
Start: 2023-10-12 | End: 2023-10-12 | Stop reason: HOSPADM

## 2023-10-12 RX ORDER — PROPOFOL 10 MG/ML
INJECTION, EMULSION INTRAVENOUS PRN
Status: DISCONTINUED | OUTPATIENT
Start: 2023-10-12 | End: 2023-10-12 | Stop reason: SDUPTHER

## 2023-10-12 RX ORDER — LIDOCAINE HYDROCHLORIDE 20 MG/ML
INJECTION, SOLUTION EPIDURAL; INFILTRATION; INTRACAUDAL; PERINEURAL PRN
Status: DISCONTINUED | OUTPATIENT
Start: 2023-10-12 | End: 2023-10-12 | Stop reason: SDUPTHER

## 2023-10-12 RX ADMIN — PROPOFOL 125 MCG/KG/MIN: 10 INJECTION, EMULSION INTRAVENOUS at 10:38

## 2023-10-12 RX ADMIN — PROPOFOL 30 MG: 10 INJECTION, EMULSION INTRAVENOUS at 10:39

## 2023-10-12 RX ADMIN — PROPOFOL 70 MG: 10 INJECTION, EMULSION INTRAVENOUS at 10:37

## 2023-10-12 RX ADMIN — LIDOCAINE HYDROCHLORIDE 100 MG: 20 INJECTION, SOLUTION EPIDURAL; INFILTRATION; INTRACAUDAL; PERINEURAL at 10:37

## 2023-10-12 RX ADMIN — SODIUM CHLORIDE: 9 INJECTION, SOLUTION INTRAVENOUS at 09:38

## 2023-10-12 ASSESSMENT — PAIN - FUNCTIONAL ASSESSMENT: PAIN_FUNCTIONAL_ASSESSMENT: NONE - DENIES PAIN

## 2023-10-12 ASSESSMENT — LIFESTYLE VARIABLES: SMOKING_STATUS: 1

## 2023-10-12 NOTE — H&P
Gastroenterology Note             Pre-operative History and Physical    Patient: Maryse Smith  : 1980  CSN:     History Obtained From:  patient and/or guardian. HISTORY OF PRESENT ILLNESS:    The patient is a 37 y.o. male  here for EGD. Epigastric pain    Past Medical History:    Past Medical History:   Diagnosis Date    Abnormal thyroid blood test     Anxiety     Back pain     lower back pain    Chronic sinus complaints     states chronic sinus inflammation    Depression     Elevated homocysteine     Fibromyalgia     GERD (gastroesophageal reflux disease)     Hypercholesteremia     Hypogonadotropic hypogonadism (HCC)     Neuropathy      Past Surgical History:    Past Surgical History:   Procedure Laterality Date    EPIDIDYMECTOMY      left    EPIGASTRIC HERNIA REPAIR      HERNIA REPAIR  2013    LAPAROSCOPIC REPAIR RECURRENT INCISONAL HERNIA WITH MESH    KIDNEY SURGERY      ureter pinched shut on left, did not improve kidney function and left kidney was removed    NECK SURGERY      mass removed    OTHER SURGICAL HISTORY      spinal cord stimulator--since removed    OTHER SURGICAL HISTORY      Morphine pain pump    [place] NJX DX/THER SBST INTRLMNR CRV/THRC W/IMG GDN N/A 2018    LEFT ABDOMINAL INTRATHECAL PAIN PUMP AND CATHETER INSERTION performed by Grayce Bamberger, MD at [address] [facility] [address]       Medications Prior to Admission:   No current facility-administered medications on file prior to encounter. Current Outpatient Medications on File Prior to Encounter   Medication Sig Dispense Refill    diazePAM (VALIUM) 10 MG tablet Take 1 tablet by mouth in the morning and at bedtime.       NEEDLE, DISP, 21 G (BD DISP NEEDLES) 21G X 1-1/2\" MISC DRAW UP TESTOSTERONE WITH 18 GUAGE AND INJECT WITH 21 GUAGE 12 each 0    famotidine (PEPCID) 40 MG tablet TAKE ONE TABLET BY MOUTH EVERY EVENING 90 tablet 0    pantoprazole (PROTONIX) 40 MG tablet Take 1 tablet by mouth

## 2023-10-12 NOTE — PROGRESS NOTES
Reviewed pt problem list, history, H&P and assessment preoperatively. Scope verified using 2 person system. Family in waiting room.     Electronically signed by Ann Marie Owusu RN on 10/12/2023 at 10:35 AM

## 2023-10-12 NOTE — DISCHARGE INSTRUCTIONS
Impression:    -Normal EGD     Symptoms of  epigastric discomfort could be related to ventral hernia mesh     Recommendations:   -Await pathology results  -Follow-up with referring MD Saleem Phipps MD, MD  [address]  [phone]    EGD DISCHARGE INSTRUCTIONS    Use salt water gargle, lozenges, or Chloraseptic spray as needed for sore throat. If you have fever, chills, excessive bleeding, severe chest pain, or abdominal pain, or any other problems, contact your physician's office immediately at [phone]. If you had an esophageal dilatation, and experience fever, chills, excessive bleeding, shortness of breath, chest or abdominal pain, or any other unusual symptom, call the office immediately at [phone]. Continue home medications as directed. Call physician's office in five to seven business days for biopsy results or further instructions. Call your physician at [phone] for a follow up appointment, pathology pending. You need another EGD, pathology pending. See your physician's report for procedure details and recommendations. ANESTHESIA DISCHARGE INSTRUCTIONS    Wear your seatbelt home. A responsible adult needs to be with you for 24 hours  You are under the influence of drugs-do not drink alcohol, drive ,operate machinery,or make any important decisions or sign any legal documentsfor 24 hours. You may resume normal activities tomorrow. You may experience lightheadedness,dizziness,or sleepiness following surgery. Rest at home today - increase activity as tolerated. It is recommended to take a four hour nap after procedure. Progress slowly to a regular diet unless your physician has instructed you otherwise. Avoid spicy and greasy food on first meal.  Drink plenty of water. If nausea becomes a problem call your physician. Call your doctor if concerns arise.

## 2023-10-12 NOTE — PROCEDURES
Endoscopy Note    Patient: Judy Daugherty  : 1980  CSN:     Procedure: Esophagogastroduodenoscopy with biopsy    Date:  10/12/2023     Surgeon:  Javed Schafer MD     Referring Provider:      Preoperative Diagnosis: Gastric discomfort    Postoperative Diagnosis:    -Normal EGD    Anesthesia: Propofol sedation    EBL: <5 mL    Indications: This is a 37y.o. year old male who presents today with complaints of epigastric discomfort. History for ventral hernia repair with mesh. Description of Procedure:  Informed consent was obtained from the patient after explanation of indications, benefits and possible risks and complications of the procedure. The patient was then taken to the endoscopy suite, placed in the left lateral decubitus position and the above IV sedation was administrered. The Olympus videoendoscope was passed through the hypopharynx into the esophagus. The scope was advanced all the way to the duodenum. The mucosa in the duodenal bulb, post bulbar region in the descending duodenum appeared normal.  Biopsies obtained to rule out Helicobacter pylori infection. The mucosa in the antrum appeared normal.  The mucosa in the remaining part of the stomach and retroflexion appeared normal.  The GE junction was at around 40 cms. the mucosa in the remainder of the esophagus appeared normal.  The scope was withdrawn and the procedure was terminated. The patient tolerated the procedure well and was taken to the post anesthesia care unit in good condition.       Impression:    -Normal EGD    Symptoms of  epigastric discomfort could be related to ventral hernia mesh    Recommendations:   -Await pathology results  -Follow-up with referring MD Javed Schafer MD, MD  [address]  [phone]

## 2023-10-12 NOTE — ANESTHESIA POSTPROCEDURE EVALUATION
Department of Anesthesiology  Postprocedure Note    Patient: Mitcheal Schaumann  MRN: [id number]  YOB: 1980  Date of evaluation: 10/12/2023      Procedure Summary     Date: 10/12/23 Room / Location: [address] [place] [address]    Anesthesia Start: 1034 Anesthesia Stop: 1051    Procedure: EGD BIOPSY (Abdomen) Diagnosis:       Epigastric abdominal pain      Dark stools      (Epigastric abdominal pain [R10.13])      (Dark stools [R19.5])    Surgeons: Rudy Box MD Responsible Provider: David Owens MD    Anesthesia Type: MAC ASA Status: 3          Anesthesia Type: No value filed. Mini Phase I: Mini Score: 10    Mini Phase II: Mini Score: 10      Anesthesia Post Evaluation    Patient location during evaluation: PACU  Patient participation: complete - patient participated  Level of consciousness: awake  Airway patency: patent  Nausea & Vomiting: no vomiting  Complications: no  Cardiovascular status: hemodynamically stable  Respiratory status: acceptable  Hydration status: euvolemic  There was medical reason for not using a multimodal analgesia pain management approach. Pain management: adequate

## 2023-11-06 ENCOUNTER — OFFICE VISIT (OUTPATIENT)
Dept: FAMILY MEDICINE CLINIC | Age: 43
End: 2023-11-06
Payer: MEDICARE

## 2023-11-06 VITALS
DIASTOLIC BLOOD PRESSURE: 80 MMHG | WEIGHT: 252 LBS | HEIGHT: 72 IN | HEART RATE: 101 BPM | BODY MASS INDEX: 34.13 KG/M2 | SYSTOLIC BLOOD PRESSURE: 124 MMHG | OXYGEN SATURATION: 96 %

## 2023-11-06 DIAGNOSIS — R73.03 PREDIABETES: ICD-10-CM

## 2023-11-06 DIAGNOSIS — R10.9 ABDOMINAL WALL PAIN: Primary | ICD-10-CM

## 2023-11-06 DIAGNOSIS — F41.9 ANXIETY: ICD-10-CM

## 2023-11-06 DIAGNOSIS — E78.2 MIXED HYPERLIPIDEMIA: ICD-10-CM

## 2023-11-06 DIAGNOSIS — I10 BENIGN HYPERTENSION: ICD-10-CM

## 2023-11-06 DIAGNOSIS — E23.0 HYPOGONADOTROPIC HYPOGONADISM (HCC): ICD-10-CM

## 2023-11-06 PROCEDURE — 3078F DIAST BP <80 MM HG: CPT | Performed by: FAMILY MEDICINE

## 2023-11-06 PROCEDURE — 3074F SYST BP LT 130 MM HG: CPT | Performed by: FAMILY MEDICINE

## 2023-11-06 PROCEDURE — 99214 OFFICE O/P EST MOD 30 MIN: CPT | Performed by: FAMILY MEDICINE

## 2023-11-06 PROCEDURE — 4004F PT TOBACCO SCREEN RCVD TLK: CPT | Performed by: FAMILY MEDICINE

## 2023-11-06 PROCEDURE — G8417 CALC BMI ABV UP PARAM F/U: HCPCS | Performed by: FAMILY MEDICINE

## 2023-11-06 PROCEDURE — G8428 CUR MEDS NOT DOCUMENT: HCPCS | Performed by: FAMILY MEDICINE

## 2023-11-06 PROCEDURE — G8484 FLU IMMUNIZE NO ADMIN: HCPCS | Performed by: FAMILY MEDICINE

## 2023-11-06 RX ORDER — DIAZEPAM 10 MG/1
10 TABLET ORAL EVERY 8 HOURS PRN
Qty: 90 TABLET | Refills: 2 | Status: CANCELLED | OUTPATIENT
Start: 2023-11-06 | End: 2024-02-04

## 2023-11-06 RX ORDER — TESTOSTERONE CYPIONATE 200 MG/ML
200 INJECTION, SOLUTION INTRAMUSCULAR
Qty: 4 ML | Refills: 2 | Status: SHIPPED | OUTPATIENT
Start: 2023-11-06 | End: 2024-02-04

## 2023-11-06 RX ORDER — DIAZEPAM 10 MG/1
TABLET ORAL
Qty: 90 TABLET | Refills: 2 | Status: SHIPPED | OUTPATIENT
Start: 2023-11-06 | End: 2024-02-05

## 2023-11-06 NOTE — PROGRESS NOTES
Angela Franklin (:  1980) is a 37 y.o. male,Established patient, here for evaluation of the following chief complaint(s):  Follow-up         ASSESSMENT/PLAN:  Miller Thrasher was seen today for follow-up. Diagnoses and all orders for this visit:    Abdominal wall pain  -     Andrea Rey MD, General Surgery, [facility]  GI eval neg. Referred to gen surg  Hypogonadotropic hypogonadism ([address])  -     testosterone cypionate (DEPOTESTOTERONE CYPIONATE) 200 MG/ML injection; Inject 1 mL into the muscle every 7 days for 90 days. -     Testosterone, free, total; Future  -     CBC; Future  Stable on testosterone   Anxiety  -     diazePAM (VALIUM) 10 MG tablet; TAKE ONE TABLET BY MOUTH EVERY 6 HOURS AS NEEDED FOR ANXIETY  Stable on valium    Controlled Substances Monitoring: Periodic Controlled Substance Monitoring: No signs of potential drug abuse or diversion identified. , Possible medication side effects, risk of tolerance/dependence & alternative treatments discussed. (Wyatt Burgos MD)   Prediabetes  -     Hemoglobin A1C; Future    Mixed hyperlipidemia  -     Lipid Panel; Future  -     Comprehensive Metabolic Panel; Future    Benign hypertension  -     CBC; Future  Stable off med       No follow-ups on file. Subjective   SUBJECTIVE/OBJECTIVE:  HPI  Pt is a of 37 y.o. male comes in today with   Chief Complaint   Patient presents with    Follow-up     Using testosterone more regularly. Fatigue improved. Had egd. Dx with h pylori. Just finished  Still having a lot of abd pain. Review of Systems     Vitals:    23 1410   BP: 124/80   Pulse: (!) 101   SpO2: 96%   Weight: 114.3 kg (252 lb)   Height: 1.829 m (6')      Objective   Physical Exam         An electronic signature was used to authenticate this note.     --Wyatt Burgos MD

## 2023-11-21 ENCOUNTER — OFFICE VISIT (OUTPATIENT)
Dept: SURGERY | Age: 43
End: 2023-11-21

## 2023-11-21 VITALS — DIASTOLIC BLOOD PRESSURE: 68 MMHG | WEIGHT: 261 LBS | BODY MASS INDEX: 35.4 KG/M2 | SYSTOLIC BLOOD PRESSURE: 105 MMHG

## 2023-11-21 DIAGNOSIS — K43.0 RECURRENT INCISIONAL HERNIA WITH INCARCERATION: ICD-10-CM

## 2023-11-21 DIAGNOSIS — R10.13 EPIGASTRIC ABDOMINAL PAIN: Primary | ICD-10-CM

## 2023-11-21 ASSESSMENT — ENCOUNTER SYMPTOMS
EYE ITCHING: 0
APNEA: 0
ABDOMINAL PAIN: 1
CONSTIPATION: 1
BACK PAIN: 0
EYE DISCHARGE: 0
CHEST TIGHTNESS: 0
COLOR CHANGE: 0

## 2023-11-21 NOTE — PROGRESS NOTES
[facility]        PATIENT NAME: Kimi Echols     TODAY'S DATE: 11/21/2023    Reason for Consult:  Abdominal pain    Requesting Physician / PCP: Dr. David Mims /  PEPPER Castillo    HISTORY OF PRESENT ILLNESS:              The patient is a 37 y.o. male who presents with abd pain, mid epigastric, focal, more with pressure, more with activity, sharp. He has had symptoms for the past several months. Associated symptoms are swelling and feeling a bulging in the area. The patient has had prior hernia surgery. He has had previous abdominal surgery. Had an initial laparotomy, then left nephrectomy, then an incisional hernia repair, and finally a recurrent incisional hernia repair by myself 10 years ago. Was laparoscopic with [facility]. He has a pain pump in the right abdominal wall. Had recent EGD, had + H. Pylori, has completed treatment for that.       Past Medical History:        Diagnosis Date    Abnormal thyroid blood test     Anxiety     Back pain     lower back pain    Chronic sinus complaints     states chronic sinus inflammation    Depression     Elevated homocysteine     Fibromyalgia     GERD (gastroesophageal reflux disease)     Hypercholesteremia     Hypogonadotropic hypogonadism (HCC)     Neuropathy        Past Surgical History:        Procedure Laterality Date    EPIDIDYMECTOMY      left    EPIGASTRIC HERNIA REPAIR      HERNIA REPAIR  11/06/2013    LAPAROSCOPIC REPAIR RECURRENT INCISONAL HERNIA WITH MESH    KIDNEY SURGERY      ureter pinched shut on left, did not improve kidney function and left kidney was removed    NECK SURGERY      mass removed    OTHER SURGICAL HISTORY      spinal cord stimulator--since removed    OTHER SURGICAL HISTORY      Morphine pain pump    [place] NJX DX/THER SBST INTRLMNR CRV/THRC W/IMG GDN N/A 09/11/2018    LEFT ABDOMINAL INTRATHECAL PAIN PUMP AND CATHETER INSERTION performed by Alex Barrera MD at [address] [place] [address]

## 2023-11-28 ENCOUNTER — TELEPHONE (OUTPATIENT)
Dept: SURGERY | Age: 43
End: 2023-11-28

## 2023-12-06 NOTE — TELEPHONE ENCOUNTER
Medication:   Requested Prescriptions     Pending Prescriptions Disp Refills    famotidine (PEPCID) 40 MG tablet [Pharmacy Med Name: FAMOTIDINE 40 MG TABLET] 90 tablet 0     Sig: TAKE ONE TABLET BY MOUTH EVERY EVENING    NEEDLE, DISP, 21 G (BD DISP NEEDLES) 21G X 1-1/2\" MISC [Pharmacy Med Name: BD NEEDLE 21G X 1.5 IN]       Sig: DRAW UP TESTOSTERONE WITH 18 GUAGE AND INJECT WITH 21 GAUGE        Last Filled:      Patient Phone Number: [phone] (home)     Last appt: 11/6/2023   Next appt: Visit date not found    Last OARRS:       11/7/2023     7:32 PM   RX Monitoring   Periodic Controlled Substance Monitoring No signs of potential drug abuse or diversion identified.;Possible medication side effects, risk of tolerance/dependence & alternative treatments discussed.

## 2023-12-07 RX ORDER — NEEDLES, DISPOSABLE 25GX5/8"
NEEDLE, DISPOSABLE MISCELLANEOUS
Qty: 12 EACH | Refills: 1 | Status: SHIPPED | OUTPATIENT
Start: 2023-12-07

## 2023-12-07 RX ORDER — FAMOTIDINE 40 MG/1
40 TABLET, FILM COATED ORAL
Qty: 90 TABLET | Refills: 0 | Status: SHIPPED | OUTPATIENT
Start: 2023-12-07

## 2023-12-14 ENCOUNTER — HOSPITAL ENCOUNTER (OUTPATIENT)
Dept: CT IMAGING | Age: 43
Discharge: HOME | End: 2023-12-14
Attending: SURGERY
Payer: MEDICARE

## 2023-12-14 DIAGNOSIS — R10.13 EPIGASTRIC ABDOMINAL PAIN: ICD-10-CM

## 2023-12-14 PROCEDURE — 6360000004 HC RX CONTRAST MEDICATION: Performed by: SURGERY

## 2023-12-14 PROCEDURE — 74177 CT ABD & PELVIS W/CONTRAST: CPT

## 2023-12-14 RX ADMIN — IOPAMIDOL 75 ML: 755 INJECTION, SOLUTION INTRAVENOUS at 16:35

## 2023-12-22 NOTE — PROGRESS NOTES
Name_______________________________________Printed:____________________  Date and time of surgery__12/27/2023________0730______________Arrival Time:___0600_____________   1. The instructions given regarding when and if a patient needs to stop oral intake prior to surgery varies. Follow the specific instructions you were given                  __x_Nothing to eat or to drink after Midnight the night before.                   ____Carbo loading or instructions will be given to select patients-if you have been given those instructions -please do the following                           The evening before your surgery after dinner before midnight drink 40 ounces of gatorade. If you are diabetic use sugar free. The morning of surgery drink 40 ounces of water. This needs to be finished 3 hours prior to your surgery start time. 2. Take the following pills with a small sip of water on the morning of surgery___pantoprazole________________________________________________                  Do not take blood pressure medications ending in pril or sartan the veronica prior to surgery or the morning of surgery. Dr Ramirez patient are not to take any medications the AM of surgery. 3. Aspirin, Ibuprofen, Advil, Naproxen, Vitamin E and other Anti-inflammatory products and supplements should be stopped for 5 -7days before surgery or as directed by your physician. 4. Check with your Doctor regarding stopping Plavix, Coumadin,Eliquis, Lovenox,Effient,Pradaxa,Xarelto, Fragmin or other blood thinners and follow their instructions. 5. Do not smoke, and do not drink any alcoholic beverages 24 hours prior to surgery. This includes NA Beer. Refrain from the usage of any recreational drugs. 6. You may brush your teeth and gargle the morning of surgery. DO NOT SWALLOW WATER   7. You MUST make arrangements for a responsible adult to stay on site while you are here and take you home after your surgery.  You will not be allowed to leave

## 2023-12-27 ENCOUNTER — HOSPITAL ENCOUNTER (OUTPATIENT)
Age: 43
Setting detail: SURGERY ADMIT
Discharge: HOME | End: 2023-12-27
Attending: SURGERY | Admitting: SURGERY
Payer: MEDICARE

## 2023-12-27 ENCOUNTER — ANESTHESIA (OUTPATIENT)
Dept: OPERATING ROOM | Age: 43
End: 2023-12-27
Payer: MEDICARE

## 2023-12-27 ENCOUNTER — ANESTHESIA EVENT (OUTPATIENT)
Dept: OPERATING ROOM | Age: 43
End: 2023-12-27
Payer: MEDICARE

## 2023-12-27 VITALS
SYSTOLIC BLOOD PRESSURE: 132 MMHG | HEART RATE: 82 BPM | RESPIRATION RATE: 17 BRPM | TEMPERATURE: 97.9 F | BODY MASS INDEX: 34.27 KG/M2 | OXYGEN SATURATION: 99 % | DIASTOLIC BLOOD PRESSURE: 78 MMHG | WEIGHT: 252.7 LBS

## 2023-12-27 DIAGNOSIS — K43.0 RECURRENT INCISIONAL HERNIA WITH INCARCERATION: Primary | ICD-10-CM

## 2023-12-27 PROCEDURE — 2500000003 HC RX 250 WO HCPCS: Performed by: NURSE ANESTHETIST, CERTIFIED REGISTERED

## 2023-12-27 PROCEDURE — 6360000002 HC RX W HCPCS: Performed by: SURGERY

## 2023-12-27 PROCEDURE — C9290 INJ, BUPIVACAINE LIPOSOME: HCPCS | Performed by: SURGERY

## 2023-12-27 PROCEDURE — 6360000002 HC RX W HCPCS: Performed by: STUDENT IN AN ORGANIZED HEALTH CARE EDUCATION/TRAINING PROGRAM

## 2023-12-27 PROCEDURE — 2709999900 HC NON-CHARGEABLE SUPPLY: Performed by: SURGERY

## 2023-12-27 PROCEDURE — 2580000003 HC RX 258: Performed by: STUDENT IN AN ORGANIZED HEALTH CARE EDUCATION/TRAINING PROGRAM

## 2023-12-27 PROCEDURE — 2720000010 HC SURG SUPPLY STERILE: Performed by: SURGERY

## 2023-12-27 PROCEDURE — 7100000011 HC PHASE II RECOVERY - ADDTL 15 MIN: Performed by: SURGERY

## 2023-12-27 PROCEDURE — 7100000010 HC PHASE II RECOVERY - FIRST 15 MIN: Performed by: SURGERY

## 2023-12-27 PROCEDURE — 2580000003 HC RX 258: Performed by: NURSE ANESTHETIST, CERTIFIED REGISTERED

## 2023-12-27 PROCEDURE — 6370000000 HC RX 637 (ALT 250 FOR IP): Performed by: STUDENT IN AN ORGANIZED HEALTH CARE EDUCATION/TRAINING PROGRAM

## 2023-12-27 PROCEDURE — 3700000001 HC ADD 15 MINUTES (ANESTHESIA): Performed by: SURGERY

## 2023-12-27 PROCEDURE — 3600000004 HC SURGERY LEVEL 4 BASE: Performed by: SURGERY

## 2023-12-27 PROCEDURE — 3600000014 HC SURGERY LEVEL 4 ADDTL 15MIN: Performed by: SURGERY

## 2023-12-27 PROCEDURE — 49618 RPR AA HRN RCR > 10 NCR/STRN: CPT | Performed by: SURGERY

## 2023-12-27 PROCEDURE — 3700000000 HC ANESTHESIA ATTENDED CARE: Performed by: SURGERY

## 2023-12-27 PROCEDURE — 7100000001 HC PACU RECOVERY - ADDTL 15 MIN: Performed by: SURGERY

## 2023-12-27 PROCEDURE — 6360000002 HC RX W HCPCS: Performed by: NURSE ANESTHETIST, CERTIFIED REGISTERED

## 2023-12-27 PROCEDURE — C1781 MESH (IMPLANTABLE): HCPCS | Performed by: SURGERY

## 2023-12-27 PROCEDURE — 7100000000 HC PACU RECOVERY - FIRST 15 MIN: Performed by: SURGERY

## 2023-12-27 PROCEDURE — 2580000003 HC RX 258: Performed by: SURGERY

## 2023-12-27 DEVICE — MESH SURG W8XL10IN ELLIPSE W/ ECHO PS POS SYS FOR LAP: Type: IMPLANTABLE DEVICE | Site: ABDOMEN | Status: FUNCTIONAL

## 2023-12-27 RX ORDER — ONDANSETRON 2 MG/ML
INJECTION INTRAMUSCULAR; INTRAVENOUS PRN
Status: DISCONTINUED | OUTPATIENT
Start: 2023-12-27 | End: 2023-12-27 | Stop reason: SDUPTHER

## 2023-12-27 RX ORDER — DEXAMETHASONE SODIUM PHOSPHATE 4 MG/ML
INJECTION, SOLUTION INTRA-ARTICULAR; INTRALESIONAL; INTRAMUSCULAR; INTRAVENOUS; SOFT TISSUE PRN
Status: DISCONTINUED | OUTPATIENT
Start: 2023-12-27 | End: 2023-12-27 | Stop reason: SDUPTHER

## 2023-12-27 RX ORDER — SUCCINYLCHOLINE/SOD CL,ISO/PF 200MG/10ML
SYRINGE (ML) INTRAVENOUS PRN
Status: DISCONTINUED | OUTPATIENT
Start: 2023-12-27 | End: 2023-12-27 | Stop reason: SDUPTHER

## 2023-12-27 RX ORDER — SODIUM CHLORIDE 0.9 % (FLUSH) 0.9 %
5-40 SYRINGE (ML) INJECTION EVERY 12 HOURS SCHEDULED
Status: DISCONTINUED | OUTPATIENT
Start: 2023-12-27 | End: 2023-12-27 | Stop reason: HOSPADM

## 2023-12-27 RX ORDER — OXYCODONE HYDROCHLORIDE 5 MG/1
5 TABLET ORAL
Status: COMPLETED | OUTPATIENT
Start: 2023-12-27 | End: 2023-12-27

## 2023-12-27 RX ORDER — PHENYLEPHRINE HCL IN 0.9% NACL 1 MG/10 ML
SYRINGE (ML) INTRAVENOUS PRN
Status: DISCONTINUED | OUTPATIENT
Start: 2023-12-27 | End: 2023-12-27 | Stop reason: SDUPTHER

## 2023-12-27 RX ORDER — SODIUM CHLORIDE, SODIUM LACTATE, POTASSIUM CHLORIDE, CALCIUM CHLORIDE 600; 310; 30; 20 MG/100ML; MG/100ML; MG/100ML; MG/100ML
INJECTION, SOLUTION INTRAVENOUS CONTINUOUS
Status: DISCONTINUED | OUTPATIENT
Start: 2023-12-27 | End: 2023-12-27 | Stop reason: HOSPADM

## 2023-12-27 RX ORDER — SODIUM CHLORIDE 0.9 % (FLUSH) 0.9 %
5-40 SYRINGE (ML) INJECTION PRN
Status: DISCONTINUED | OUTPATIENT
Start: 2023-12-27 | End: 2023-12-27 | Stop reason: HOSPADM

## 2023-12-27 RX ORDER — BUPIVACAINE HYDROCHLORIDE 5 MG/ML
INJECTION, SOLUTION EPIDURAL; INTRACAUDAL
Status: COMPLETED | OUTPATIENT
Start: 2023-12-27 | End: 2023-12-27

## 2023-12-27 RX ORDER — HYDROMORPHONE HYDROCHLORIDE 2 MG/ML
0.5 INJECTION, SOLUTION INTRAMUSCULAR; INTRAVENOUS; SUBCUTANEOUS EVERY 5 MIN PRN
Status: DISCONTINUED | OUTPATIENT
Start: 2023-12-27 | End: 2023-12-27 | Stop reason: HOSPADM

## 2023-12-27 RX ORDER — APREPITANT 40 MG/1
CAPSULE ORAL
Status: DISCONTINUED
Start: 2023-12-27 | End: 2023-12-27 | Stop reason: HOSPADM

## 2023-12-27 RX ORDER — DOCUSATE SODIUM 100 MG/1
100 CAPSULE, LIQUID FILLED ORAL 2 TIMES DAILY
Qty: 30 CAPSULE | Refills: 0 | Status: SHIPPED | OUTPATIENT
Start: 2023-12-27

## 2023-12-27 RX ORDER — SODIUM CHLORIDE 9 MG/ML
INJECTION, SOLUTION INTRAVENOUS PRN
Status: DISCONTINUED | OUTPATIENT
Start: 2023-12-27 | End: 2023-12-27 | Stop reason: HOSPADM

## 2023-12-27 RX ORDER — ACETAMINOPHEN 325 MG/1
TABLET ORAL
Status: DISCONTINUED
Start: 2023-12-27 | End: 2023-12-27 | Stop reason: HOSPADM

## 2023-12-27 RX ORDER — KETAMINE HCL IN NACL, ISO-OSM 100MG/10ML
SYRINGE (ML) INJECTION PRN
Status: DISCONTINUED | OUTPATIENT
Start: 2023-12-27 | End: 2023-12-27 | Stop reason: SDUPTHER

## 2023-12-27 RX ORDER — MAGNESIUM SULFATE HEPTAHYDRATE 500 MG/ML
INJECTION, SOLUTION INTRAMUSCULAR; INTRAVENOUS PRN
Status: DISCONTINUED | OUTPATIENT
Start: 2023-12-27 | End: 2023-12-27 | Stop reason: SDUPTHER

## 2023-12-27 RX ORDER — FENTANYL CITRATE 50 UG/ML
50 INJECTION, SOLUTION INTRAMUSCULAR; INTRAVENOUS EVERY 5 MIN PRN
Status: DISCONTINUED | OUTPATIENT
Start: 2023-12-27 | End: 2023-12-27 | Stop reason: HOSPADM

## 2023-12-27 RX ORDER — SODIUM CHLORIDE 9 MG/ML
INJECTION, SOLUTION INTRAVENOUS
Status: DISCONTINUED
Start: 2023-12-27 | End: 2023-12-27 | Stop reason: HOSPADM

## 2023-12-27 RX ORDER — ACETAMINOPHEN 325 MG/1
650 TABLET ORAL ONCE
Status: COMPLETED | OUTPATIENT
Start: 2023-12-27 | End: 2023-12-27

## 2023-12-27 RX ORDER — LIDOCAINE HYDROCHLORIDE 20 MG/ML
INJECTION, SOLUTION EPIDURAL; INFILTRATION; INTRACAUDAL; PERINEURAL PRN
Status: DISCONTINUED | OUTPATIENT
Start: 2023-12-27 | End: 2023-12-27 | Stop reason: SDUPTHER

## 2023-12-27 RX ORDER — APREPITANT 40 MG/1
40 CAPSULE ORAL ONCE
Status: COMPLETED | OUTPATIENT
Start: 2023-12-27 | End: 2023-12-27

## 2023-12-27 RX ORDER — DEXMEDETOMIDINE HYDROCHLORIDE 100 UG/ML
INJECTION, SOLUTION INTRAVENOUS PRN
Status: DISCONTINUED | OUTPATIENT
Start: 2023-12-27 | End: 2023-12-27 | Stop reason: SDUPTHER

## 2023-12-27 RX ORDER — SODIUM CHLORIDE, SODIUM LACTATE, POTASSIUM CHLORIDE, CALCIUM CHLORIDE 600; 310; 30; 20 MG/100ML; MG/100ML; MG/100ML; MG/100ML
INJECTION, SOLUTION INTRAVENOUS CONTINUOUS PRN
Status: DISCONTINUED | OUTPATIENT
Start: 2023-12-27 | End: 2023-12-27 | Stop reason: SDUPTHER

## 2023-12-27 RX ORDER — FENTANYL CITRATE 50 UG/ML
INJECTION, SOLUTION INTRAMUSCULAR; INTRAVENOUS PRN
Status: DISCONTINUED | OUTPATIENT
Start: 2023-12-27 | End: 2023-12-27 | Stop reason: SDUPTHER

## 2023-12-27 RX ORDER — VECURONIUM BROMIDE 1 MG/ML
INJECTION, POWDER, LYOPHILIZED, FOR SOLUTION INTRAVENOUS PRN
Status: DISCONTINUED | OUTPATIENT
Start: 2023-12-27 | End: 2023-12-27 | Stop reason: SDUPTHER

## 2023-12-27 RX ORDER — ONDANSETRON 2 MG/ML
4 INJECTION INTRAMUSCULAR; INTRAVENOUS
Status: DISCONTINUED | OUTPATIENT
Start: 2023-12-27 | End: 2023-12-27 | Stop reason: HOSPADM

## 2023-12-27 RX ORDER — HYDROCODONE BITARTRATE AND ACETAMINOPHEN 5; 325 MG/1; MG/1
1 TABLET ORAL EVERY 6 HOURS PRN
Qty: 20 TABLET | Refills: 0 | Status: SHIPPED | OUTPATIENT
Start: 2023-12-27 | End: 2024-01-03

## 2023-12-27 RX ORDER — PROPOFOL 10 MG/ML
INJECTION, EMULSION INTRAVENOUS PRN
Status: DISCONTINUED | OUTPATIENT
Start: 2023-12-27 | End: 2023-12-27 | Stop reason: SDUPTHER

## 2023-12-27 RX ADMIN — Medication 20 MG: at 08:36

## 2023-12-27 RX ADMIN — ACETAMINOPHEN 650 MG: 325 TABLET ORAL at 07:00

## 2023-12-27 RX ADMIN — ONDANSETRON 4 MG: 2 INJECTION INTRAMUSCULAR; INTRAVENOUS at 07:45

## 2023-12-27 RX ADMIN — Medication 100 MCG: at 08:52

## 2023-12-27 RX ADMIN — HYDROMORPHONE HYDROCHLORIDE 0.5 MG: 2 INJECTION, SOLUTION INTRAMUSCULAR; INTRAVENOUS; SUBCUTANEOUS at 09:43

## 2023-12-27 RX ADMIN — DEXMEDETOMIDINE HYDROCHLORIDE 10 MCG: 100 INJECTION, SOLUTION INTRAVENOUS at 08:12

## 2023-12-27 RX ADMIN — APREPITANT 40 MG: 40 CAPSULE ORAL at 06:59

## 2023-12-27 RX ADMIN — VECURONIUM BROMIDE 10 MG: 1 INJECTION, POWDER, LYOPHILIZED, FOR SOLUTION INTRAVENOUS at 08:28

## 2023-12-27 RX ADMIN — CEFAZOLIN 2000 MG: 2 INJECTION, POWDER, FOR SOLUTION INTRAMUSCULAR; INTRAVENOUS at 07:30

## 2023-12-27 RX ADMIN — OXYCODONE HYDROCHLORIDE 5 MG: 5 TABLET ORAL at 09:57

## 2023-12-27 RX ADMIN — Medication 200 MCG: at 07:56

## 2023-12-27 RX ADMIN — Medication 10 MG: at 08:28

## 2023-12-27 RX ADMIN — SUGAMMADEX 200 MG: 100 INJECTION, SOLUTION INTRAVENOUS at 08:52

## 2023-12-27 RX ADMIN — Medication 20 MG: at 07:59

## 2023-12-27 RX ADMIN — LIDOCAINE HYDROCHLORIDE 100 MG: 20 INJECTION, SOLUTION EPIDURAL; INFILTRATION; INTRACAUDAL; PERINEURAL at 07:37

## 2023-12-27 RX ADMIN — PROPOFOL 200 MG: 10 INJECTION, EMULSION INTRAVENOUS at 07:37

## 2023-12-27 RX ADMIN — MAGNESIUM SULFATE HEPTAHYDRATE 1 G: 500 INJECTION, SOLUTION INTRAMUSCULAR; INTRAVENOUS at 07:53

## 2023-12-27 RX ADMIN — Medication 100 MCG: at 08:37

## 2023-12-27 RX ADMIN — VECURONIUM BROMIDE 10 MG: 1 INJECTION, POWDER, LYOPHILIZED, FOR SOLUTION INTRAVENOUS at 07:50

## 2023-12-27 RX ADMIN — SODIUM CHLORIDE, POTASSIUM CHLORIDE, SODIUM LACTATE AND CALCIUM CHLORIDE: 600; 310; 30; 20 INJECTION, SOLUTION INTRAVENOUS at 06:58

## 2023-12-27 RX ADMIN — FENTANYL CITRATE 50 MCG: 50 INJECTION, SOLUTION INTRAMUSCULAR; INTRAVENOUS at 07:37

## 2023-12-27 RX ADMIN — DEXMEDETOMIDINE HYDROCHLORIDE 10 MCG: 100 INJECTION, SOLUTION INTRAVENOUS at 08:51

## 2023-12-27 RX ADMIN — DEXAMETHASONE SODIUM PHOSPHATE 8 MG: 4 INJECTION, SOLUTION INTRAMUSCULAR; INTRAVENOUS at 07:45

## 2023-12-27 RX ADMIN — Medication 170 MG: at 07:37

## 2023-12-27 RX ADMIN — SODIUM CHLORIDE, POTASSIUM CHLORIDE, SODIUM LACTATE AND CALCIUM CHLORIDE: 600; 310; 30; 20 INJECTION, SOLUTION INTRAVENOUS at 07:33

## 2023-12-27 NOTE — PROGRESS NOTES
Pt arrived from OR, report from crna/rn. Pt had incisional hernia repair with mesh, surgical incision x 4 abdomen well approximated with glue, no drainage/ swelling/ bruising noted. Ice pack placed on abdomen. Pt asleep non responsive, respirations even unlabored, simple mask 6 liters and oral airway in place. Abdomen soft and rounded. VSS.

## 2023-12-27 NOTE — BRIEF OP NOTE
Brief Postoperative Note      Patient: Yissel Davis  YOB: 1980  MRN: [id number]    Date of Procedure: 12/27/2023    Pre-Op Diagnosis Codes:     * Recurrent incisional hernia [K43.2]    Post-Op Diagnosis: Same       Procedure(s):  LAPAROSCOPIC REPAIR OF RECURRENT INCARCERATED INCISIONAL HERNIA    Surgeon(s):  Robert Porter MD    Assistant:  Surgical Assistant: Trent Dove    Anesthesia: General    Estimated Blood Loss (mL): Minimal    Complications: None    Specimens:   * No specimens in log *    Implants:  Implant Name Type Inv.  Item Serial No.  Lot No. LRB No. Used Action   MESH SURG [id number] ELLIPSE W/ ECHO PS POS SYS FOR LAP - [id number]  MESH SURG [id number] ELLIPSE W/ ECHO PS POS SYS FOR LAP  BARD DAVOL-WD [id number] N/A 1 Implanted         Drains:   Urinary Catheter 12/27/23 2 Way (Active)       Findings: 11 cm recurrent incarcerated hernia repaired      Electronically signed by Thiago Barakat MD on 12/27/2023 at 8:53 AM

## 2023-12-27 NOTE — PROGRESS NOTES
Patient to phase 2, alert/oriented and currently drinking oral fluids. Incision sites to abdomen x 4, surgical glue to areas that are clean/dry/intact. Abdominal binder applied with ice pack to incisional sites. Pt verbalizes request to be discharge to home, girlfriend/caregiver now at bedside. Discharge instructions reviewed and to include paper prescription x 1 as requested by patient. Patient in no apparent distress.

## 2023-12-27 NOTE — DISCHARGE INSTRUCTIONS
Massimo Steele M.D.    [phone] [address], [address] [facility], [address] [place] [address]    Call the office to schedule your post-operative appointment with your surgeon for two (2) weeks. Your incisions are waterproof, you may shower. Place an ice pack over your incisions on and off (15min) at a time for the next 2 days. General guidelines for activity:      Avoid strenuous activity or lifting anything heavier than 15 pounds. It is OK to be up and walking around. Going up and down stairs is   also OK. Do what is comfortable: stop and rest when you feel tired. You will have pain medicine ordered. Take as directed. Do NOT drive for three days and while taking your narcotic pain medicine. Watch for signs of infection:    Excessive warmth or bright redness around your incisions    Leakage of bloody or cloudy fluid from you incisions    Fever over 100.5    If you experience constipation  Increase your water intake. Increase your activity; walking is best.  A stool softener or mild laxative may be necessary if you still have not had a bowel  movement ; call the office for further instructions. ANESTHESIA DISCHARGE INSTRUCTIONS    Wear your seatbelt home. You are under the influence of drugs-do not drink alcohol, drive, operate machinery, make any important decisions or sign any legal documents for 24 hours. Children should not ride bikes, [place] or play on gym sets for 24 hours after surgery. A responsible adult needs to be with you for 24 hours. You may experience lightheadedness, dizziness, or sleepiness following surgery. Rest at home today- increase activity as tolerated. Progress slowly to a regular diet unless your physician has instructed you otherwise.

## 2023-12-27 NOTE — PROGRESS NOTES
Pt ready for discharge, caregiver in room. Questions brought up regarding work note/FMLA. Dr. Estill Schwab directed them to contact his office, work note provided until 12/29/23 as requested. Verbalized understanding and satisfaction with information. Taken to lobby via wheelchair.  Patient in no apparent distress

## 2023-12-27 NOTE — H&P
PATIENT NAME: Maryse Smith      HISTORY OF PRESENT ILLNESS:               The patient is a 37 y.o. male who presents with abd pain, mid epigastric, focal, more with pressure, more with activity, sharp. He has had symptoms for the past several months. Associated symptoms are swelling and feeling a bulging in the area. The patient has had prior hernia surgery. He has had previous abdominal surgery. Had an initial laparotomy, then left nephrectomy, then an incisional hernia repair, and finally a recurrent incisional hernia repair by myself 10 years ago. Was laparoscopic with [facility]. He has a pain pump in the right abdominal wall. Had recent EGD, had + H. Pylori, has completed treatment for that.         Past Medical History:    Past Medical History            Diagnosis Date    Abnormal thyroid blood test      Anxiety      Back pain       lower back pain    Chronic sinus complaints       states chronic sinus inflammation    Depression      Elevated homocysteine      Fibromyalgia      GERD (gastroesophageal reflux disease)      Hypercholesteremia      Hypogonadotropic hypogonadism (HCC)      Neuropathy              Past Surgical History:    Past Surgical History             Procedure Laterality Date    EPIDIDYMECTOMY         left    EPIGASTRIC HERNIA REPAIR        HERNIA REPAIR   11/06/2013     LAPAROSCOPIC REPAIR RECURRENT INCISONAL HERNIA WITH MESH    KIDNEY SURGERY         ureter pinched shut on left, did not improve kidney function and left kidney was removed    NECK SURGERY         mass removed    OTHER SURGICAL HISTORY         spinal cord stimulator--since removed    OTHER SURGICAL HISTORY         Morphine pain pump    [place] NJX DX/THER SBST INTRLMNR CRV/THRC W/IMG GDN N/A 09/11/2018     LEFT ABDOMINAL INTRATHECAL PAIN PUMP AND CATHETER INSERTION performed by Grayce Bamberger, MD at [address] [place] [address] N/A 10/12/2023     EGD BIOPSY

## 2023-12-27 NOTE — PROGRESS NOTES
Pt transferred to [facility], phase 2, bedside report to [facility]. VSS,. Pt awake and alert, pain controlled, no report of nausea. Abdomen soft and rounded. Surgical incisions unchanged.

## 2023-12-27 NOTE — ANESTHESIA POSTPROCEDURE EVALUATION
Department of Anesthesiology  Postprocedure Note    Patient: Eliseo Hopson  MRN: [id number]  YOB: 1980  Date of evaluation: 12/27/2023    Procedure Summary       Date: 12/27/23 Room / Location: [address]    Anesthesia Start: 7926 Anesthesia Stop: 0906    Procedure: LAPAROSCOPIC REPAIR OF RECURRENT INCARCERATED INCISIONAL HERNIA (Abdomen) Diagnosis:       Recurrent incisional hernia      (Recurrent incisional hernia [K43.2])    Surgeons: López Marino MD Responsible Provider: Angelita Venegas MD    Anesthesia Type: General ASA Status: 3            Anesthesia Type: General    Mini Phase I: Mini Score: 10    Mini Phase II:      Anesthesia Post Evaluation    Patient location during evaluation: bedside  Patient participation: complete - patient participated  Level of consciousness: awake and alert  Pain score: 2  Airway patency: patent  Nausea & Vomiting: no vomiting  Cardiovascular status: hemodynamically stable  Respiratory status: nonlabored ventilation  Hydration status: stable  Multimodal analgesia pain management approach  Pain management: adequate    No notable events documented.

## 2023-12-27 NOTE — ANESTHESIA PRE PROCEDURE
Department of Anesthesiology  Preprocedure Note       Name:  Cal Meigs   Age:  37 y.o.  :  1980                                          MRN:  [id number]         Date:  2023      Surgeon: Viv Dubois):  Chaitanya Hernandez MD    Procedure: Procedure(s):  LAPAROSCOPIC REPAIR OF RECURRENT INCARCERATED INCISIONAL HERNIA    Medications prior to admission:   Prior to Admission medications    Medication Sig Start Date End Date Taking? Authorizing Provider   famotidine (PEPCID) 40 MG tablet TAKE ONE TABLET BY MOUTH EVERY EVENING 23   Pam Galaviz MD   NEEDLE, DISP, 21 G (BD DISP NEEDLES) 21G X 1-\" MISC DRAW UP TESTOSTERONE WITH 18 GUAGE AND INJECT WITH 21 GAUGE 23   Pam Galaviz MD   testosterone cypionate (DEPOTESTOTERONE CYPIONATE) 200 MG/ML injection Inject 1 mL into the muscle every 7 days for 90 days.  23  Pam Galaviz MD   diazePAM (VALIUM) 10 MG tablet TAKE ONE TABLET BY MOUTH EVERY 6 HOURS AS NEEDED FOR ANXIETY 23  Pam Galaviz MD   pantoprazole (PROTONIX) 40 MG tablet Take 1 tablet by mouth every morning (before breakfast) 23   Pam Galaviz MD   rosuvastatin (CRESTOR) 5 MG tablet Take 1 tablet by mouth daily 23   Pam Galaviz MD   furosemide (LASIX) 20 MG tablet Take 1 tablet by mouth 2 times daily  Patient not taking: Reported on 2023   Pam Galaviz MD   sildenafil (VIAGRA) 100 MG tablet Take 1 tablet by mouth as needed for Erectile Dysfunction 23   MD TRES Rios-D 3CC LUER-SRAVANTHI SYR 74EG2-1/2 18G X 1-2\" 3 ML MISC USE 18G TO DRAW UP TESTOSTERONE AND THEN USE 21G TO INJECT EVERY 2 WEEKS 22   Pam Galaviz MD   azelastine (ASTELIN) 0.1 % nasal spray 1 spray by Nasal route 2 times daily Use in each nostril as directed 10/20/22   Sonal Dennis DO   NEEDLE, DISP, 18 G 18G X 1-1/2\" MISC Draw up testosterone with 18 gauge and inject with 21 gauge 22   Jose Cox,

## 2023-12-27 NOTE — OP NOTE
[address] [facility]                     [address], [address]                                OPERATIVE REPORT    PATIENT NAME: Raleigh Cohen                   :        1980  MED REC NO:   [id number]                          ROOM:  ACCOUNT NO:   [de-identified]                           ADMIT DATE: 2023  PROVIDER:     Loco Ruiz MD    DATE OF PROCEDURE:  2023    PREOPERATIVE DIAGNOSIS:  Recurrent incarcerated incisional hernia. POSTOPERATIVE DIAGNOSIS:  Recurrent incarcerated incisional hernia. OPERATION PERFORMED:  Laparoscopic repair of 11-cm recurrent  incarcerated incisional hernia with mesh. SURGEON:  Loco Ruiz MD    ANESTHESIA:  General plus local.    ESTIMATED BLOOD LOSS:  Minimal.    COMPLICATIONS:  None. FINDINGS:  The patient had several fascial defects in old midline  incision and alongside the previous mesh repair, coursing up in the mid  upper abdomen from prior intra-abdominal surgery and nephrectomy. In  total, this was 11 cm span of repair and the repair was completed with a  25-cm Ventralight ST mesh laparoscopically. ADDITIONAL DETAILS OF THE SURGERY:  The patient was brought to the  operating room and placed on the operative table in the supine position. Compression boots were placed. General anesthetic was administered. The abdomen was prepped and draped sterilely. Rossi catheter was used. Time-out was done. Right upper quadrant 5-mm direct view port was  placed and the abdominal cavity was insufflated 15 mmHg pressure. Two  additional 5-mm ports were placed on the right side and all port sites  were free of bleeding. The area around the liver, right colon, and  small bowel regions were all checked and all appeared fine. The area of  the hernia was then assessed. There was bowel and omentum adhered up  into the hernia and incarcerated omentum up in the hernias cells.   Sharp  scissor

## 2024-01-09 ENCOUNTER — OFFICE VISIT (OUTPATIENT)
Dept: SURGERY | Age: 44
End: 2024-01-09
Payer: MEDICARE

## 2024-01-09 VITALS — BODY MASS INDEX: 34.18 KG/M2 | WEIGHT: 252 LBS

## 2024-01-09 DIAGNOSIS — K43.0 RECURRENT INCISIONAL HERNIA WITH INCARCERATION: Primary | ICD-10-CM

## 2024-01-09 PROCEDURE — G8427 DOCREV CUR MEDS BY ELIG CLIN: HCPCS | Performed by: SURGERY

## 2024-01-09 PROCEDURE — G8417 CALC BMI ABV UP PARAM F/U: HCPCS | Performed by: SURGERY

## 2024-01-09 PROCEDURE — 99212 OFFICE O/P EST SF 10 MIN: CPT | Performed by: SURGERY

## 2024-01-09 PROCEDURE — 4004F PT TOBACCO SCREEN RCVD TLK: CPT | Performed by: SURGERY

## 2024-01-09 PROCEDURE — G8484 FLU IMMUNIZE NO ADMIN: HCPCS | Performed by: SURGERY

## 2024-01-09 NOTE — PROGRESS NOTES
[facility]              PATIENT NAME: Rodrigo Antonio     TODAY'S DATE: 1/9/2024    CC: hernia  SUBJECTIVE:      Pt. returns to the office today following a laparoscopic ventral incisional hernia repair with mesh. He had surgery on 12/27/23 at [facility]. He has been recovering well to date, with progression towards normal activity and diet. He has no complaints today. Up and active, some soreness with pressure. No incision issues. No fever.          OBJECTIVE:   VITALS:  Wt 114.3 kg (252 lb)   BMI 34.18 kg/m²                                  CONSTITUTIONAL:  awake and alert  LUNGS:  clear to auscultation  ABDOMEN:   Hernia repair is intact, normal bowel sounds, soft, non-distended, non-tender   INCISIONS: clean, dry, no drainage        ASSESSMENT AND PLAN:  43 y.o. male status post laparoscopic ventral incisional hernia repair.      His recovery is progressing uneventfully, and he is released to progressive normal activity.  OK for light duty next week, regular at 6 weeks post op.  He will call or return for any additional problems or questions.      Luis F Geller MD

## 2024-01-10 ENCOUNTER — TELEPHONE (OUTPATIENT)
Dept: SURGERY | Age: 44
End: 2024-01-10

## 2024-01-10 NOTE — TELEPHONE ENCOUNTER
Pt called stating that [employer] called and asked him when he will be returning to work. The paperwork didn't have a date. He did get a letter in the office on 1/9/24 while here for post op stating he'd go back 1/15/24. He said Dr. Geller told him it would be okay for him to take another week off and go back 1/22/24. He will let us know by 1/12/24 whether or not he decides to take another week off so we can update the disability paperwork. If you need to speak with him please call [phone]

## 2024-01-11 ENCOUNTER — TELEPHONE (OUTPATIENT)
Dept: SURGERY | Age: 44
End: 2024-01-11

## 2024-01-11 NOTE — TELEPHONE ENCOUNTER
PO 12/27/23- LAPAROSCOPIC REPAIR OF RECURRENT INCARCERATED INCISIONAL HERNIA     Pt requesting [facility] paperwork be completed with a return to work date of January 22, 2024 with restrictions and then to full duty on February 7, 2024.

## 2024-01-11 NOTE — TELEPHONE ENCOUNTER
I don't have an new [facility] paperwork to fill out, only the original [employer] forms that did not request for a date on them. Only the surgery date and that a 2 week post op date was scheduled.     Letter was done by our  and faxed for him today.

## 2024-01-31 DIAGNOSIS — E23.0 HYPOGONADOTROPIC HYPOGONADISM (HCC): ICD-10-CM

## 2024-01-31 RX ORDER — DOCUSATE SODIUM 100 MG/1
100 CAPSULE, LIQUID FILLED ORAL 2 TIMES DAILY
Qty: 30 CAPSULE | Refills: 0 | OUTPATIENT
Start: 2024-01-31

## 2024-01-31 RX ORDER — SYRINGE WITH NEEDLE, 1 ML 25GX5/8"
SYRINGE, EMPTY DISPOSABLE MISCELLANEOUS
Qty: 6 EACH | Refills: 2 | Status: SHIPPED | OUTPATIENT
Start: 2024-01-31

## 2024-01-31 RX ORDER — TESTOSTERONE CYPIONATE 200 MG/ML
INJECTION, SOLUTION INTRAMUSCULAR
Qty: 4 ML | Refills: 2 | Status: SHIPPED | OUTPATIENT
Start: 2024-01-31 | End: 2024-04-30

## 2024-01-31 NOTE — TELEPHONE ENCOUNTER
Medication:   Requested Prescriptions     Pending Prescriptions Disp Refills    testosterone cypionate (DEPOTESTOTERONE CYPIONATE) 200 MG/ML injection [Pharmacy Med Name: TESTOSTERONE  MG/ML] 4 mL      Sig: INJECT 1ML INTO THE MUSCLE EVERY 7 DAYS FOR 90 DAYS    B-D 3CC LUER-SRAVANTHI SYR 67IJ7-1/2 18G X 1-1/2\" 3 ML MISC [Pharmacy Med Name: BD 3 ML SYRINGE 21IE8-9/2\"]       Sig: USE 18G TO DRAW UP TESTOSTERONE AND THEN USE 21G TO INJECT EVERY 2 WEEKS        Last Filled:      Patient Phone Number: [phone] (home)     Last appt: 11/6/2023   Next appt: Visit date not found    Last OARRS:       11/7/2023     7:32 PM   RX Monitoring   Periodic Controlled Substance Monitoring No signs of potential drug abuse or diversion identified.;Possible medication side effects, risk of tolerance/dependence & alternative treatments discussed.

## 2024-03-20 ENCOUNTER — OFFICE VISIT (OUTPATIENT)
Dept: FAMILY MEDICINE CLINIC | Age: 44
End: 2024-03-20
Payer: MEDICARE

## 2024-03-20 VITALS
HEIGHT: 72 IN | OXYGEN SATURATION: 95 % | SYSTOLIC BLOOD PRESSURE: 120 MMHG | WEIGHT: 260 LBS | DIASTOLIC BLOOD PRESSURE: 72 MMHG | BODY MASS INDEX: 35.21 KG/M2 | HEART RATE: 90 BPM

## 2024-03-20 DIAGNOSIS — E66.01 SEVERE OBESITY (BMI 35.0-39.9) WITH COMORBIDITY (HCC): ICD-10-CM

## 2024-03-20 DIAGNOSIS — E23.0 HYPOGONADOTROPIC HYPOGONADISM (HCC): ICD-10-CM

## 2024-03-20 DIAGNOSIS — F33.1 MODERATE EPISODE OF RECURRENT MAJOR DEPRESSIVE DISORDER (HCC): ICD-10-CM

## 2024-03-20 DIAGNOSIS — R73.03 PREDIABETES: ICD-10-CM

## 2024-03-20 DIAGNOSIS — I10 BENIGN HYPERTENSION: ICD-10-CM

## 2024-03-20 DIAGNOSIS — E78.2 MIXED HYPERLIPIDEMIA: Primary | ICD-10-CM

## 2024-03-20 DIAGNOSIS — F41.9 ANXIETY: ICD-10-CM

## 2024-03-20 PROCEDURE — 99214 OFFICE O/P EST MOD 30 MIN: CPT | Performed by: FAMILY MEDICINE

## 2024-03-20 PROCEDURE — G8417 CALC BMI ABV UP PARAM F/U: HCPCS | Performed by: FAMILY MEDICINE

## 2024-03-20 PROCEDURE — 3078F DIAST BP <80 MM HG: CPT | Performed by: FAMILY MEDICINE

## 2024-03-20 PROCEDURE — 3074F SYST BP LT 130 MM HG: CPT | Performed by: FAMILY MEDICINE

## 2024-03-20 PROCEDURE — G8484 FLU IMMUNIZE NO ADMIN: HCPCS | Performed by: FAMILY MEDICINE

## 2024-03-20 PROCEDURE — 4004F PT TOBACCO SCREEN RCVD TLK: CPT | Performed by: FAMILY MEDICINE

## 2024-03-20 PROCEDURE — G8427 DOCREV CUR MEDS BY ELIG CLIN: HCPCS | Performed by: FAMILY MEDICINE

## 2024-03-20 RX ORDER — FAMOTIDINE 40 MG/1
40 TABLET, FILM COATED ORAL DAILY
Qty: 90 TABLET | Refills: 0 | Status: SHIPPED | OUTPATIENT
Start: 2024-03-20

## 2024-03-20 RX ORDER — PANTOPRAZOLE SODIUM 40 MG/1
40 TABLET, DELAYED RELEASE ORAL
Qty: 30 TABLET | Refills: 2 | Status: SHIPPED | OUTPATIENT
Start: 2024-03-20

## 2024-03-20 RX ORDER — TESTOSTERONE CYPIONATE 200 MG/ML
INJECTION, SOLUTION INTRAMUSCULAR
Qty: 4 ML | Refills: 2 | Status: SHIPPED | OUTPATIENT
Start: 2024-03-20 | End: 2024-06-21

## 2024-03-20 RX ORDER — ROSUVASTATIN CALCIUM 5 MG/1
5 TABLET, COATED ORAL DAILY
Qty: 90 TABLET | Refills: 1 | Status: SHIPPED | OUTPATIENT
Start: 2024-03-20 | End: 2024-03-22

## 2024-03-20 RX ORDER — DIAZEPAM 10 MG/1
TABLET ORAL
Qty: 90 TABLET | Refills: 2 | Status: SHIPPED | OUTPATIENT
Start: 2024-03-20 | End: 2024-06-19

## 2024-03-20 NOTE — PROGRESS NOTES
Rodrigo Antonio (:  1980) is a 43 y.o. male,Established patient, here for evaluation of the following chief complaint(s):  No chief complaint on file.         ASSESSMENT/PLAN:  Rodrigo was seen today for medication check.    Diagnoses and all orders for this visit:    Mixed hyperlipidemia  -     Lipid Panel; Future  -     Comprehensive Metabolic Panel; Future  Stable on crestor  Anxiety  -     diazePAM (VALIUM) 10 MG tablet; TAKE ONE TABLET BY MOUTH EVERY 6 HOURS AS NEEDED FOR ANXIETY  Stable on valium. Taking as prescribed    Controlled Substances Monitoring: Periodic Controlled Substance Monitoring: Possible medication side effects, risk of tolerance/dependence & alternative treatments discussed., No signs of potential drug abuse or diversion identified. (Tanner Castillo MD)   Hypogonadotropic hypogonadism (HCC)  -     testosterone cypionate (DEPOTESTOTERONE CYPIONATE) 200 MG/ML injection; INJECT 1ML INTO THE MUSCLE EVERY 7 DAYS FOR 90 DAYS  -     CBC; Future  -     Testosterone, free, total; Future  Symptoms improved on testosterone   Severe obesity (BMI 35.0-39.9) with comorbidity (HCC)  Not well controlled  Working on diet  Moderate episode of recurrent major depressive disorder (HCC)  Stable off med  Prediabetes  -     Hemoglobin A1C; Future  Stable with diet  Benign hypertension  -     CBC; Future    Other orders  -     rosuvastatin (CRESTOR) 5 MG tablet; Take 1 tablet by mouth daily  -     pantoprazole (PROTONIX) 40 MG tablet; Take 1 tablet by mouth every morning (before breakfast)  -     famotidine (PEPCID) 40 MG tablet; Take 1 tablet by mouth daily         No follow-ups on file.         Subjective   SUBJECTIVE/OBJECTIVE:  HPI  Pt is a of 43 y.o. male comes in today with   Chief Complaint   Patient presents with    Medication Check       Review of Systems       Objective   Physical Exam         An electronic signature was used to authenticate this note.    --Tanner Castillo MD

## 2024-03-22 RX ORDER — ROSUVASTATIN CALCIUM 5 MG/1
5 TABLET, COATED ORAL DAILY
Qty: 90 TABLET | Refills: 1 | Status: SHIPPED | OUTPATIENT
Start: 2024-03-22

## 2024-03-22 NOTE — TELEPHONE ENCOUNTER
Medication:   Requested Prescriptions     Pending Prescriptions Disp Refills    rosuvastatin (CRESTOR) 5 MG tablet [Pharmacy Med Name: ROSUVASTATIN CALCIUM 5 MG TAB] 90 tablet 1     Sig: TAKE 1 TABLET BY MOUTH DAILY        Last Filled:  3/20/24    Patient Phone Number: [phone] (home)     Last appt: 3/20/2024   Next appt: 7/2/2024    Last OARRS:       3/20/2024     4:22 PM   RX Monitoring   Periodic Controlled Substance Monitoring Possible medication side effects, risk of tolerance/dependence & alternative treatments discussed.;No signs of potential drug abuse or diversion identified.

## 2024-04-04 NOTE — TELEPHONE ENCOUNTER
Medication:   Requested Prescriptions     Pending Prescriptions Disp Refills    pantoprazole (PROTONIX) 40 MG tablet [Pharmacy Med Name: PANTOPRAZOLE SOD DR 40 MG TAB] 30 tablet 2     Sig: TAKE ONE TABLET BY MOUTH EVERY MORNING BEFORE BREAKFAST        Last Filled:  3/20/24    Patient Phone Number: [phone] (home)     Last appt: 3/20/2024   Next appt: 7/2/2024    Last OARRS:       3/20/2024     4:22 PM   RX Monitoring   Periodic Controlled Substance Monitoring Possible medication side effects, risk of tolerance/dependence & alternative treatments discussed.;No signs of potential drug abuse or diversion identified.

## 2024-04-05 RX ORDER — PANTOPRAZOLE SODIUM 40 MG/1
40 TABLET, DELAYED RELEASE ORAL
Qty: 30 TABLET | Refills: 2 | Status: SHIPPED | OUTPATIENT
Start: 2024-04-05

## 2024-07-15 ENCOUNTER — OFFICE VISIT (OUTPATIENT)
Dept: FAMILY MEDICINE CLINIC | Age: 44
End: 2024-07-15
Payer: MEDICARE

## 2024-07-15 VITALS
OXYGEN SATURATION: 96 % | HEIGHT: 72 IN | WEIGHT: 263 LBS | BODY MASS INDEX: 35.62 KG/M2 | DIASTOLIC BLOOD PRESSURE: 68 MMHG | HEART RATE: 95 BPM | SYSTOLIC BLOOD PRESSURE: 136 MMHG

## 2024-07-15 DIAGNOSIS — F41.9 ANXIETY: ICD-10-CM

## 2024-07-15 DIAGNOSIS — E23.0 HYPOGONADOTROPIC HYPOGONADISM (HCC): ICD-10-CM

## 2024-07-15 DIAGNOSIS — Z00.00 INITIAL MEDICARE ANNUAL WELLNESS VISIT: Primary | ICD-10-CM

## 2024-07-15 PROCEDURE — G0438 PPPS, INITIAL VISIT: HCPCS | Performed by: FAMILY MEDICINE

## 2024-07-15 RX ORDER — DIAZEPAM 10 MG/1
TABLET ORAL
Qty: 90 TABLET | Refills: 2 | Status: SHIPPED | OUTPATIENT
Start: 2024-07-15 | End: 2024-10-14

## 2024-07-15 RX ORDER — TESTOSTERONE CYPIONATE 200 MG/ML
INJECTION, SOLUTION INTRAMUSCULAR
Qty: 4 ML | Refills: 2 | Status: CANCELLED | OUTPATIENT
Start: 2024-07-15 | End: 2024-10-16

## 2024-07-15 RX ORDER — FAMOTIDINE 40 MG/1
40 TABLET, FILM COATED ORAL DAILY
Qty: 90 TABLET | Refills: 0 | Status: SHIPPED | OUTPATIENT
Start: 2024-07-15

## 2024-07-15 RX ORDER — TESTOSTERONE CYPIONATE 200 MG/ML
INJECTION, SOLUTION INTRAMUSCULAR
Qty: 4 ML | Refills: 2 | Status: SHIPPED | OUTPATIENT
Start: 2024-07-15 | End: 2024-10-16

## 2024-07-15 SDOH — ECONOMIC STABILITY: FOOD INSECURITY: WITHIN THE PAST 12 MONTHS, YOU WORRIED THAT YOUR FOOD WOULD RUN OUT BEFORE YOU GOT MONEY TO BUY MORE.: NEVER TRUE

## 2024-07-15 SDOH — ECONOMIC STABILITY: FOOD INSECURITY: WITHIN THE PAST 12 MONTHS, THE FOOD YOU BOUGHT JUST DIDN'T LAST AND YOU DIDN'T HAVE MONEY TO GET MORE.: NEVER TRUE

## 2024-07-15 SDOH — ECONOMIC STABILITY: HOUSING INSECURITY
IN THE LAST 12 MONTHS, WAS THERE A TIME WHEN YOU DID NOT HAVE A STEADY PLACE TO SLEEP OR SLEPT IN A SHELTER (INCLUDING NOW)?: NO

## 2024-07-15 SDOH — ECONOMIC STABILITY: INCOME INSECURITY: HOW HARD IS IT FOR YOU TO PAY FOR THE VERY BASICS LIKE FOOD, HOUSING, MEDICAL CARE, AND HEATING?: NOT HARD AT ALL

## 2024-07-15 ASSESSMENT — PATIENT HEALTH QUESTIONNAIRE - PHQ9
SUM OF ALL RESPONSES TO PHQ QUESTIONS 1-9: 0
1. LITTLE INTEREST OR PLEASURE IN DOING THINGS: NOT AT ALL
9. THOUGHTS THAT YOU WOULD BE BETTER OFF DEAD, OR OF HURTING YOURSELF: NOT AT ALL
SUM OF ALL RESPONSES TO PHQ QUESTIONS 1-9: 0
4. FEELING TIRED OR HAVING LITTLE ENERGY: NOT AT ALL
5. POOR APPETITE OR OVEREATING: NOT AT ALL
SUM OF ALL RESPONSES TO PHQ QUESTIONS 1-9: 0
3. TROUBLE FALLING OR STAYING ASLEEP: NOT AT ALL
SUM OF ALL RESPONSES TO PHQ9 QUESTIONS 1 & 2: 0
6. FEELING BAD ABOUT YOURSELF - OR THAT YOU ARE A FAILURE OR HAVE LET YOURSELF OR YOUR FAMILY DOWN: NOT AT ALL
7. TROUBLE CONCENTRATING ON THINGS, SUCH AS READING THE NEWSPAPER OR WATCHING TELEVISION: NOT AT ALL
10. IF YOU CHECKED OFF ANY PROBLEMS, HOW DIFFICULT HAVE THESE PROBLEMS MADE IT FOR YOU TO DO YOUR WORK, TAKE CARE OF THINGS AT HOME, OR GET ALONG WITH OTHER PEOPLE: NOT DIFFICULT AT ALL
2. FEELING DOWN, DEPRESSED OR HOPELESS: NOT AT ALL
8. MOVING OR SPEAKING SO SLOWLY THAT OTHER PEOPLE COULD HAVE NOTICED. OR THE OPPOSITE, BEING SO FIGETY OR RESTLESS THAT YOU HAVE BEEN MOVING AROUND A LOT MORE THAN USUAL: NOT AT ALL
SUM OF ALL RESPONSES TO PHQ QUESTIONS 1-9: 0

## 2024-07-15 ASSESSMENT — LIFESTYLE VARIABLES
HOW OFTEN DO YOU HAVE A DRINK CONTAINING ALCOHOL: NEVER
HOW MANY STANDARD DRINKS CONTAINING ALCOHOL DO YOU HAVE ON A TYPICAL DAY: PATIENT DOES NOT DRINK

## 2024-07-15 NOTE — PATIENT INSTRUCTIONS
7/15/2024  Medicare offers a range of preventive health benefits. Some of the tests and screenings are paid in full while other may be subject to a deductible, co-insurance, and/or copay.    Some of these benefits include a comprehensive review of your medical history including lifestyle, illnesses that may run in your family, and various assessments and screenings as appropriate.    After reviewing your medical record and screening and assessments performed today your provider may have ordered immunizations, labs, imaging, and/or referrals for you.  A list of these orders (if applicable) as well as your Preventive Care list are included within your After Visit Summary for your review.    Other Preventive Recommendations:    A preventive eye exam performed by an eye specialist is recommended every 1-2 years to screen for glaucoma; cataracts, macular degeneration, and other eye disorders.  A preventive dental visit is recommended every 6 months.  Try to get at least 150 minutes of exercise per week or 10,000 steps per day on a pedometer .  Order or download the FREE \"Exercise & Physical Activity: Your Everyday Guide\" from [employer] [place] [employer]. Call 1-194.240.8657 or search [employer] [place] [employer] online.  You need 5190-1156 mg of calcium and 2507-2162 IU of vitamin D per day. It is possible to meet your calcium requirement with diet alone, but a vitamin D supplement is usually necessary to meet this goal.  When exposed to the sun, use a sunscreen that protects against both UVA and UVB radiation with an SPF of 30 or greater. Reapply every 2 to 3 hours or after sweating, drying off with a towel, or swimming.  Always wear a seat belt when traveling in a car. Always wear a helmet when riding a bicycle or motorcycle.

## 2024-07-15 NOTE — PROGRESS NOTES
providing care):   Patient Care Team:  Tanner Castillo MD as PCP - General (Family Medicine)  Tanner Castillo MD as PCP - Empaneled Provider  Luis F Geller MD as Surgeon (General Surgery)      Reviewed and updated this visit:  Allergies  Meds

## 2024-07-19 RX ORDER — PANTOPRAZOLE SODIUM 40 MG/1
40 TABLET, DELAYED RELEASE ORAL
Qty: 90 TABLET | Refills: 1 | Status: SHIPPED | OUTPATIENT
Start: 2024-07-19

## 2024-07-19 NOTE — TELEPHONE ENCOUNTER
Medication:   Requested Prescriptions     Pending Prescriptions Disp Refills    pantoprazole (PROTONIX) 40 MG tablet [Pharmacy Med Name: PANTOPRAZOLE SOD DR 40 MG TAB] 90 tablet      Sig: TAKE ONE TABLET BY MOUTH EVERY MORNING BEFORE BREAKFAST       Last Filled:  4/5/24    Patient Phone Number: [phone] (home)     Last appt: 7/15/2024   Next appt: Visit date not found    Last Labs DM:   Lab Results   Component Value Date/Time    LABA1C 5.3 12/19/2023 07:41 AM     Last Lipid:   Lab Results   Component Value Date/Time    CHOL 201 12/19/2023 07:41 AM    TRIG 150 12/19/2023 07:41 AM    HDL 25 12/19/2023 07:41 AM     Last PSA:   Lab Results   Component Value Date/Time    PSA 0.90 04/15/2019 08:38 AM     Last Thyroid:   Lab Results   Component Value Date/Time    TSH 0.28 07/14/2023 07:49 AM    FT3 2.9 03/07/2014 06:34 PM    [id number] 0.84 11/19/2012 10:13 AM    T4FREE 1.3 08/30/2016 06:20 PM

## 2024-07-26 DIAGNOSIS — I10 BENIGN HYPERTENSION: ICD-10-CM

## 2024-07-26 DIAGNOSIS — E23.0 HYPOGONADOTROPIC HYPOGONADISM (HCC): ICD-10-CM

## 2024-07-26 DIAGNOSIS — E78.2 MIXED HYPERLIPIDEMIA: ICD-10-CM

## 2024-07-26 DIAGNOSIS — R73.03 PREDIABETES: ICD-10-CM

## 2024-07-26 LAB
ALBUMIN SERPL-MCNC: 4.3 G/DL (ref 3.4–5)
ALBUMIN/GLOB SERPL: 2 {RATIO} (ref 1.1–2.2)
ALP SERPL-CCNC: 48 U/L (ref 40–129)
ALT SERPL-CCNC: 21 U/L (ref 10–40)
ANION GAP SERPL CALCULATED.3IONS-SCNC: 8 MMOL/L (ref 3–16)
AST SERPL-CCNC: 14 U/L (ref 15–37)
BILIRUB SERPL-MCNC: 0.5 MG/DL (ref 0–1)
BUN SERPL-MCNC: 11 MG/DL (ref 7–20)
CALCIUM SERPL-MCNC: 8.9 MG/DL (ref 8.3–10.6)
CHLORIDE SERPL-SCNC: 107 MMOL/L (ref 99–110)
CHOLEST SERPL-MCNC: 196 MG/DL (ref 0–199)
CO2 SERPL-SCNC: 26 MMOL/L (ref 21–32)
CREAT SERPL-MCNC: 1 MG/DL (ref 0.9–1.3)
DEPRECATED RDW RBC AUTO: 13.9 % (ref 12.4–15.4)
EST. AVERAGE GLUCOSE BLD GHB EST-MCNC: 108.3 MG/DL
GFR SERPLBLD CREATININE-BSD FMLA CKD-EPI: >90 ML/MIN/{1.73_M2}
GLUCOSE SERPL-MCNC: 91 MG/DL (ref 70–99)
HBA1C MFR BLD: 5.4 %
HCT VFR BLD AUTO: 50.3 % (ref 40.5–52.5)
HDLC SERPL-MCNC: 27 MG/DL (ref 40–60)
HGB BLD-MCNC: 16.8 G/DL (ref 13.5–17.5)
LDLC SERPL CALC-MCNC: 138 MG/DL
MCH RBC QN AUTO: 29.1 PG (ref 26–34)
MCHC RBC AUTO-ENTMCNC: 33.4 G/DL (ref 31–36)
MCV RBC AUTO: 87 FL (ref 80–100)
PLATELET # BLD AUTO: 147 K/UL (ref 135–450)
PMV BLD AUTO: 10 FL (ref 5–10.5)
POTASSIUM SERPL-SCNC: 4.6 MMOL/L (ref 3.5–5.1)
PROT SERPL-MCNC: 6.5 G/DL (ref 6.4–8.2)
RBC # BLD AUTO: 5.78 M/UL (ref 4.2–5.9)
SODIUM SERPL-SCNC: 141 MMOL/L (ref 136–145)
TRIGL SERPL-MCNC: 156 MG/DL (ref 0–150)
VLDLC SERPL CALC-MCNC: 31 MG/DL
WBC # BLD AUTO: 7 K/UL (ref 4–11)

## 2024-07-30 LAB
SHBG SERPL-SCNC: 30 NMOL/L (ref 17–56)
TESTOST FREE SERPL-MCNC: 235.6 PG/ML (ref 47–244)
TESTOST SERPL-MCNC: 941 NG/DL (ref 249–836)

## 2024-10-09 RX ORDER — FAMOTIDINE 40 MG/1
40 TABLET, FILM COATED ORAL DAILY
Qty: 90 TABLET | Refills: 0 | Status: SHIPPED | OUTPATIENT
Start: 2024-10-09

## 2024-10-09 NOTE — TELEPHONE ENCOUNTER
Medication:   Requested Prescriptions     Pending Prescriptions Disp Refills    famotidine (PEPCID) 40 MG tablet [Pharmacy Med Name: FAMOTIDINE 40 MG TABLET] 90 tablet 0     Sig: TAKE 1 TABLET BY MOUTH DAILY        Last Filled:  7/15/2024     Patient Phone Number: [phone] (home)     Last appt: 7/15/2024   Next appt: Visit date not found    Last OARRS:       7/15/2024     9:12 PM   RX Monitoring   Periodic Controlled Substance Monitoring Possible medication side effects, risk of tolerance/dependence & alternative treatments discussed.;No signs of potential drug abuse or diversion identified.

## 2024-10-15 ENCOUNTER — OFFICE VISIT (OUTPATIENT)
Dept: FAMILY MEDICINE CLINIC | Age: 44
End: 2024-10-15

## 2024-10-15 VITALS
DIASTOLIC BLOOD PRESSURE: 70 MMHG | BODY MASS INDEX: 35.49 KG/M2 | HEART RATE: 97 BPM | HEIGHT: 72 IN | OXYGEN SATURATION: 97 % | SYSTOLIC BLOOD PRESSURE: 126 MMHG | WEIGHT: 262 LBS

## 2024-10-15 DIAGNOSIS — I10 BENIGN HYPERTENSION: ICD-10-CM

## 2024-10-15 DIAGNOSIS — Z51.81 THERAPEUTIC DRUG MONITORING: ICD-10-CM

## 2024-10-15 DIAGNOSIS — F41.9 ANXIETY: ICD-10-CM

## 2024-10-15 DIAGNOSIS — R73.03 PREDIABETES: ICD-10-CM

## 2024-10-15 DIAGNOSIS — Z12.5 SCREENING PSA (PROSTATE SPECIFIC ANTIGEN): ICD-10-CM

## 2024-10-15 DIAGNOSIS — E23.0 HYPOGONADOTROPIC HYPOGONADISM (HCC): Primary | ICD-10-CM

## 2024-10-15 RX ORDER — PANTOPRAZOLE SODIUM 40 MG/1
40 TABLET, DELAYED RELEASE ORAL
Qty: 90 TABLET | Refills: 1 | Status: SHIPPED | OUTPATIENT
Start: 2024-10-15

## 2024-10-15 RX ORDER — FAMOTIDINE 40 MG/1
40 TABLET, FILM COATED ORAL DAILY
Qty: 90 TABLET | Refills: 0 | Status: SHIPPED | OUTPATIENT
Start: 2024-10-15

## 2024-10-15 RX ORDER — DIAZEPAM 10 MG
TABLET ORAL
Qty: 90 TABLET | Refills: 2 | Status: SHIPPED | OUTPATIENT
Start: 2024-10-15 | End: 2025-01-14

## 2024-10-15 RX ORDER — ROSUVASTATIN CALCIUM 5 MG/1
5 TABLET, COATED ORAL DAILY
Qty: 90 TABLET | Refills: 1 | Status: SHIPPED | OUTPATIENT
Start: 2024-10-15

## 2024-10-15 RX ORDER — NEEDLES, DISPOSABLE 25GX5/8"
NEEDLE, DISPOSABLE MISCELLANEOUS
Qty: 20 EACH | Refills: 1 | Status: SHIPPED | OUTPATIENT
Start: 2024-10-15

## 2024-10-15 RX ORDER — SYRINGE WITH NEEDLE, 1 ML 25GX5/8"
SYRINGE, EMPTY DISPOSABLE MISCELLANEOUS
Qty: 20 EACH | Refills: 2 | Status: SHIPPED | OUTPATIENT
Start: 2024-10-15

## 2024-10-15 RX ORDER — TESTOSTERONE CYPIONATE 200 MG/ML
INJECTION, SOLUTION INTRAMUSCULAR
Qty: 4 ML | Refills: 2 | Status: SHIPPED | OUTPATIENT
Start: 2024-10-15 | End: 2025-01-16

## 2024-10-15 NOTE — PROGRESS NOTES
Chief Complaint   Patient presents with    Medication Check     Here for med check.  Taking meds as prescribed.      Vitals:    10/15/24 1438   BP: 126/70   Pulse: 97   SpO2: 97%   Weight: 118.8 kg (262 lb)   Height: 1.816 m (5' 11.5\")       Past Medical History:Reviewed  Medications:Reviewed.  No Known Allergies   Social hx:Reviewed.  Social History     Tobacco Use   Smoking Status Every Day    Current packs/day: 2.00    Average packs/day: 2.0 packs/day for 15.0 years (30.0 ttl pk-yrs)    Types: Cigarettes   Smokeless Tobacco Never   Tobacco Comments    pt denies counciling        Review of Systems   Constitutional: Negative.           Objective   Physical Exam  Constitutional:       Appearance: He is well-developed.   HENT:      Head: Normocephalic and atraumatic.   Eyes:      General: No scleral icterus.     Pupils: Pupils are equal, round, and reactive to light.   Skin:     General: Skin is warm and dry.   Neurological:      Mental Status: He is alert and oriented to person, place, and time.      Cranial Nerves: No cranial nerve deficit.   Psychiatric:         Behavior: Behavior normal.         Thought Content: Thought content normal.         Judgment: Judgment normal.              An electronic signature was used to authenticate this note.    --Tanner Castillo MD

## 2025-01-27 DIAGNOSIS — I10 BENIGN HYPERTENSION: ICD-10-CM

## 2025-01-27 DIAGNOSIS — E23.0 HYPOGONADOTROPIC HYPOGONADISM (HCC): ICD-10-CM

## 2025-01-27 DIAGNOSIS — R73.03 PREDIABETES: ICD-10-CM

## 2025-01-27 LAB
ALBUMIN SERPL-MCNC: 4.5 G/DL (ref 3.4–5)
ALBUMIN/GLOB SERPL: 2.4 {RATIO} (ref 1.1–2.2)
ALP SERPL-CCNC: 42 U/L (ref 40–129)
ALT SERPL-CCNC: 39 U/L (ref 10–40)
ANION GAP SERPL CALCULATED.3IONS-SCNC: 8 MMOL/L (ref 3–16)
AST SERPL-CCNC: 22 U/L (ref 15–37)
BILIRUB SERPL-MCNC: 0.3 MG/DL (ref 0–1)
BUN SERPL-MCNC: 11 MG/DL (ref 7–20)
CALCIUM SERPL-MCNC: 9.4 MG/DL (ref 8.3–10.6)
CHLORIDE SERPL-SCNC: 106 MMOL/L (ref 99–110)
CO2 SERPL-SCNC: 29 MMOL/L (ref 21–32)
CREAT SERPL-MCNC: 1.1 MG/DL (ref 0.9–1.3)
DEPRECATED RDW RBC AUTO: 13.3 % (ref 12.4–15.4)
EST. AVERAGE GLUCOSE BLD GHB EST-MCNC: 108.3 MG/DL
GFR SERPLBLD CREATININE-BSD FMLA CKD-EPI: 84 ML/MIN/{1.73_M2}
GLUCOSE SERPL-MCNC: 86 MG/DL (ref 70–99)
HBA1C MFR BLD: 5.4 %
HCT VFR BLD AUTO: 52.5 % (ref 40.5–52.5)
HGB BLD-MCNC: 17.6 G/DL (ref 13.5–17.5)
MCH RBC QN AUTO: 29 PG (ref 26–34)
MCHC RBC AUTO-ENTMCNC: 33.5 G/DL (ref 31–36)
MCV RBC AUTO: 86.8 FL (ref 80–100)
PLATELET # BLD AUTO: 165 K/UL (ref 135–450)
PMV BLD AUTO: 9.8 FL (ref 5–10.5)
POTASSIUM SERPL-SCNC: 4.7 MMOL/L (ref 3.5–5.1)
PROT SERPL-MCNC: 6.4 G/DL (ref 6.4–8.2)
RBC # BLD AUTO: 6.05 M/UL (ref 4.2–5.9)
SODIUM SERPL-SCNC: 143 MMOL/L (ref 136–145)
WBC # BLD AUTO: 6.7 K/UL (ref 4–11)

## 2025-01-30 LAB
SHBG SERPL-SCNC: 33 NMOL/L (ref 17–56)
TESTOST FREE SERPL-MCNC: 195.6 PG/ML (ref 47–244)
TESTOST SERPL-MCNC: 843 NG/DL (ref 249–836)

## 2025-02-10 ENCOUNTER — OFFICE VISIT (OUTPATIENT)
Dept: FAMILY MEDICINE CLINIC | Age: 45
End: 2025-02-10
Payer: MEDICARE

## 2025-02-10 VITALS
WEIGHT: 267 LBS | DIASTOLIC BLOOD PRESSURE: 82 MMHG | HEIGHT: 72 IN | OXYGEN SATURATION: 96 % | HEART RATE: 92 BPM | SYSTOLIC BLOOD PRESSURE: 112 MMHG | BODY MASS INDEX: 36.16 KG/M2

## 2025-02-10 DIAGNOSIS — N52.9 ERECTILE DYSFUNCTION, UNSPECIFIED ERECTILE DYSFUNCTION TYPE: ICD-10-CM

## 2025-02-10 DIAGNOSIS — E78.2 MIXED HYPERLIPIDEMIA: Primary | ICD-10-CM

## 2025-02-10 DIAGNOSIS — F41.9 ANXIETY: ICD-10-CM

## 2025-02-10 DIAGNOSIS — E23.0 HYPOGONADOTROPIC HYPOGONADISM (HCC): ICD-10-CM

## 2025-02-10 PROBLEM — F33.1 MODERATE EPISODE OF RECURRENT MAJOR DEPRESSIVE DISORDER (HCC): Status: RESOLVED | Noted: 2020-10-09 | Resolved: 2025-02-10

## 2025-02-10 PROCEDURE — 99214 OFFICE O/P EST MOD 30 MIN: CPT | Performed by: FAMILY MEDICINE

## 2025-02-10 PROCEDURE — 4004F PT TOBACCO SCREEN RCVD TLK: CPT | Performed by: FAMILY MEDICINE

## 2025-02-10 PROCEDURE — G8417 CALC BMI ABV UP PARAM F/U: HCPCS | Performed by: FAMILY MEDICINE

## 2025-02-10 PROCEDURE — G8427 DOCREV CUR MEDS BY ELIG CLIN: HCPCS | Performed by: FAMILY MEDICINE

## 2025-02-10 RX ORDER — TESTOSTERONE CYPIONATE 200 MG/ML
160 INJECTION, SOLUTION INTRAMUSCULAR WEEKLY
Qty: 4 ML | Refills: 2 | Status: SHIPPED | OUTPATIENT
Start: 2025-02-10 | End: 2025-04-29

## 2025-02-10 RX ORDER — FUROSEMIDE 20 MG/1
20 TABLET ORAL 2 TIMES DAILY
Qty: 60 TABLET | Refills: 2 | Status: SHIPPED | OUTPATIENT
Start: 2025-02-10

## 2025-02-10 RX ORDER — SILDENAFIL 100 MG/1
100 TABLET, FILM COATED ORAL PRN
Qty: 10 TABLET | Refills: 5 | Status: SHIPPED | OUTPATIENT
Start: 2025-02-10

## 2025-02-10 RX ORDER — ROSUVASTATIN CALCIUM 5 MG/1
5 TABLET, COATED ORAL DAILY
Qty: 90 TABLET | Refills: 1 | Status: SHIPPED | OUTPATIENT
Start: 2025-02-10

## 2025-02-10 RX ORDER — DIAZEPAM 10 MG/1
TABLET ORAL
Qty: 90 TABLET | Refills: 2 | Status: SHIPPED | OUTPATIENT
Start: 2025-02-10 | End: 2025-05-12

## 2025-02-10 RX ORDER — PANTOPRAZOLE SODIUM 40 MG/1
40 TABLET, DELAYED RELEASE ORAL
Qty: 90 TABLET | Refills: 1 | Status: SHIPPED | OUTPATIENT
Start: 2025-02-10

## 2025-02-10 SDOH — ECONOMIC STABILITY: FOOD INSECURITY: WITHIN THE PAST 12 MONTHS, THE FOOD YOU BOUGHT JUST DIDN'T LAST AND YOU DIDN'T HAVE MONEY TO GET MORE.: NEVER TRUE

## 2025-02-10 SDOH — ECONOMIC STABILITY: FOOD INSECURITY: WITHIN THE PAST 12 MONTHS, YOU WORRIED THAT YOUR FOOD WOULD RUN OUT BEFORE YOU GOT MONEY TO BUY MORE.: NEVER TRUE

## 2025-02-10 ASSESSMENT — PATIENT HEALTH QUESTIONNAIRE - PHQ9
4. FEELING TIRED OR HAVING LITTLE ENERGY: NOT AT ALL
7. TROUBLE CONCENTRATING ON THINGS, SUCH AS READING THE NEWSPAPER OR WATCHING TELEVISION: NOT AT ALL
SUM OF ALL RESPONSES TO PHQ9 QUESTIONS 1 & 2: 0
5. POOR APPETITE OR OVEREATING: NOT AT ALL
6. FEELING BAD ABOUT YOURSELF - OR THAT YOU ARE A FAILURE OR HAVE LET YOURSELF OR YOUR FAMILY DOWN: NOT AT ALL
1. LITTLE INTEREST OR PLEASURE IN DOING THINGS: NOT AT ALL
SUM OF ALL RESPONSES TO PHQ QUESTIONS 1-9: 0
SUM OF ALL RESPONSES TO PHQ QUESTIONS 1-9: 0
8. MOVING OR SPEAKING SO SLOWLY THAT OTHER PEOPLE COULD HAVE NOTICED. OR THE OPPOSITE, BEING SO FIGETY OR RESTLESS THAT YOU HAVE BEEN MOVING AROUND A LOT MORE THAN USUAL: NOT AT ALL
2. FEELING DOWN, DEPRESSED OR HOPELESS: NOT AT ALL
9. THOUGHTS THAT YOU WOULD BE BETTER OFF DEAD, OR OF HURTING YOURSELF: NOT AT ALL
SUM OF ALL RESPONSES TO PHQ QUESTIONS 1-9: 0
3. TROUBLE FALLING OR STAYING ASLEEP: NOT AT ALL
10. IF YOU CHECKED OFF ANY PROBLEMS, HOW DIFFICULT HAVE THESE PROBLEMS MADE IT FOR YOU TO DO YOUR WORK, TAKE CARE OF THINGS AT HOME, OR GET ALONG WITH OTHER PEOPLE: NOT DIFFICULT AT ALL
SUM OF ALL RESPONSES TO PHQ QUESTIONS 1-9: 0

## 2025-02-10 NOTE — PROGRESS NOTES
Rodrigo Antonio (:  1980) is a 44 y.o. male,Established patient, here for evaluation of the following chief complaint(s):  Medication Check      Assessment & Plan   ASSESSMENT/PLAN:  Rodrigo \"Jacky\" was seen today for medication check.    Diagnoses and all orders for this visit:    Mixed hyperlipidemia  -     Lipid Panel; Future  Recheck to see if stable on statin. Will work on taking more regularly 1st  Hypogonadotropic hypogonadism (HCC)  -     testosterone cypionate (DEPOTESTOTERONE CYPIONATE) 200 MG/ML injection; Inject 0.8 mLs into the muscle once a week for 12 doses. INJECT 1ML INTO THE MUSCLE EVERY 7 DAYS FOR 90 DAYS Max Daily Amount: 160 mg  -     CBC; Future  -     Testosterone, free, total; Future  Symptoms stable, but since hg high need to reduce dose  Recheck in 3 months.  Erectile dysfunction, unspecified erectile dysfunction type  -     sildenafil (VIAGRA) 100 MG tablet; Take 1 tablet by mouth as needed for Erectile Dysfunction  Stable on prn viagra    Anxiety  -     diazePAM (VALIUM) 10 MG tablet; TAKE ONE TABLET BY MOUTH EVERY 6 HOURS AS NEEDED FOR ANXIETY  Stable on valium.  Taking as prescribed.   No side effects  Controlled Substances Monitoring: Periodic Controlled Substance Monitoring: Possible medication side effects, risk of tolerance/dependence & alternative treatments discussed., No signs of potential drug abuse or diversion identified. (Tanner Castillo MD)   Other orders  -     furosemide (LASIX) 20 MG tablet; Take 1 tablet by mouth 2 times daily  -     pantoprazole (PROTONIX) 40 MG tablet; Take 1 tablet by mouth every morning (before breakfast)  -     rosuvastatin (CRESTOR) 5 MG tablet; Take 1 tablet by mouth daily         Return in about 3 months (around 5/10/2025).         Subjective   SUBJECTIVE/OBJECTIVE:  HPI    Review of Systems       Objective   Physical Exam         An electronic signature was used to authenticate this note.    --Tanner Castillo MD

## 2025-02-11 DIAGNOSIS — E23.0 HYPOGONADOTROPIC HYPOGONADISM: ICD-10-CM

## 2025-02-17 ENCOUNTER — TELEPHONE (OUTPATIENT)
Dept: FAMILY MEDICINE CLINIC | Age: 45
End: 2025-02-17

## 2025-02-17 DIAGNOSIS — E23.0 HYPOGONADOTROPIC HYPOGONADISM: ICD-10-CM

## 2025-02-17 NOTE — TELEPHONE ENCOUNTER
[facility] will not fill his testosterone   They need to clarify the directions on the med  They have 2 different sets    Please resend to [facility]  Call pt when completed

## 2025-02-18 RX ORDER — TESTOSTERONE CYPIONATE 200 MG/ML
160 INJECTION, SOLUTION INTRAMUSCULAR WEEKLY
Qty: 4 ML | Refills: 2 | Status: SHIPPED | OUTPATIENT
Start: 2025-02-18 | End: 2025-05-07

## 2025-04-04 ENCOUNTER — TRANSCRIBE ORDERS (OUTPATIENT)
Dept: ADMINISTRATIVE | Age: 45
End: 2025-04-04

## 2025-04-04 DIAGNOSIS — N13.5 OBSTRUCTION OF RIGHT URETEROPELVIC JUNCTION (UPJ): Primary | ICD-10-CM

## 2025-04-06 ENCOUNTER — HOSPITAL ENCOUNTER (OUTPATIENT)
Dept: CT IMAGING | Age: 45
Discharge: HOME | End: 2025-04-06
Attending: UROLOGY
Payer: MEDICARE

## 2025-04-06 DIAGNOSIS — N13.5 OBSTRUCTION OF RIGHT URETEROPELVIC JUNCTION (UPJ): ICD-10-CM

## 2025-04-06 PROCEDURE — 74176 CT ABD & PELVIS W/O CONTRAST: CPT

## 2025-04-14 ENCOUNTER — OFFICE VISIT (OUTPATIENT)
Dept: FAMILY MEDICINE CLINIC | Age: 45
End: 2025-04-14

## 2025-04-14 VITALS
WEIGHT: 257 LBS | SYSTOLIC BLOOD PRESSURE: 116 MMHG | HEIGHT: 71 IN | BODY MASS INDEX: 35.98 KG/M2 | OXYGEN SATURATION: 96 % | DIASTOLIC BLOOD PRESSURE: 70 MMHG | HEART RATE: 89 BPM

## 2025-04-14 DIAGNOSIS — M54.50 CHRONIC MIDLINE LOW BACK PAIN WITHOUT SCIATICA: ICD-10-CM

## 2025-04-14 DIAGNOSIS — Z01.818 PREOP EXAMINATION: Primary | ICD-10-CM

## 2025-04-14 DIAGNOSIS — N13.5 UPJ (URETEROPELVIC JUNCTION) OBSTRUCTION: ICD-10-CM

## 2025-04-14 DIAGNOSIS — G89.29 CHRONIC MIDLINE LOW BACK PAIN WITHOUT SCIATICA: ICD-10-CM

## 2025-04-14 ASSESSMENT — ENCOUNTER SYMPTOMS: RESPIRATORY NEGATIVE: 1

## 2025-04-14 NOTE — PROGRESS NOTES
Rodrigo Antonio (:  1980) is a 45 y.o. male,Established patient, here for evaluation of the following chief complaint(s):  Pre-op Exam (/Navjot Hubbard/Urology [facility] ROBOTIC PYELOPLASTY 25/)      Assessment & Plan   ASSESSMENT/PLAN:  Rodrigo Burger"Jacky\" was seen today for pre-op exam.    Diagnoses and all orders for this visit:    Preop examination  Medically patient is at acceptable risk to undergo planned surgery.   UPJ (ureteropelvic junction) obstruction  Scheduled for surgery by Dr. Hubbard   Chronic midline low back pain without sciatica  Scheduled for intrathecal pain pump replacement        No follow-ups on file.         Subjective   SUBJECTIVE/OBJECTIVE:  HPI  Pt is a of 45 y.o. male comes in today with   Chief Complaint   Patient presents with    Pre-op Exam       Navjot Hubbard  Urology [facility] ROBOTIC PYELOPLASTY 25     Scheduled for kidney surgery .  Scheduled for pain pump replacement .  No personal or family history of adverse reaction to anesthesia or bleeding tendency.  No change in exercise tolerance.  Working on smoking cessation.    Vitals:    25 1435   BP: 116/70   Pulse: 89   SpO2: 96%   Weight: 116.6 kg (257 lb)   Height: 1.803 m (5' 11\")     No Known Allergies    Current Outpatient Medications on File Prior to Visit   Medication Sig Dispense Refill    testosterone cypionate (DEPOTESTOTERONE CYPIONATE) 200 MG/ML injection Inject 0.8 mLs into the muscle once a week for 12 doses. Max Daily Amount: 160 mg 4 mL 2    furosemide (LASIX) 20 MG tablet Take 1 tablet by mouth 2 times daily 60 tablet 2    pantoprazole (PROTONIX) 40 MG tablet Take 1 tablet by mouth every morning (before breakfast) 90 tablet 1    rosuvastatin (CRESTOR) 5 MG tablet Take 1 tablet by mouth daily 90 tablet 1    sildenafil (VIAGRA) 100 MG tablet Take 1 tablet by mouth as needed for Erectile Dysfunction 10 tablet 5    diazePAM (VALIUM) 10 MG tablet TAKE

## 2025-05-21 DIAGNOSIS — E23.0 HYPOGONADOTROPIC HYPOGONADISM: ICD-10-CM

## 2025-05-21 RX ORDER — TESTOSTERONE CYPIONATE 200 MG/ML
INJECTION, SOLUTION INTRAMUSCULAR
Qty: 4 ML | Refills: 0 | Status: SHIPPED | OUTPATIENT
Start: 2025-05-21 | End: 2025-08-19

## 2025-05-21 NOTE — TELEPHONE ENCOUNTER
Medication:   Requested Prescriptions     Pending Prescriptions Disp Refills    testosterone cypionate (DEPOTESTOTERONE CYPIONATE) 200 MG/ML injection [Pharmacy Med Name: TESTOSTERONE  MG/ML] 4 mL      Sig: INJECT 0.8 MILLILITERS INTRAMUSCULARLY ONCE WEEKLY        Last Filled:  2/18/25    Patient Phone Number: [phone] (home)     Last appt: 4/14/2025   Next appt: 6/23/2025    Last OARRS:       2/10/2025     2:23 PM   RX Monitoring   Periodic Controlled Substance Monitoring Possible medication side effects, risk of tolerance/dependence & alternative treatments discussed.;No signs of potential drug abuse or diversion identified.

## 2025-06-23 ENCOUNTER — OFFICE VISIT (OUTPATIENT)
Dept: FAMILY MEDICINE CLINIC | Age: 45
End: 2025-06-23
Payer: MEDICARE

## 2025-06-23 VITALS
WEIGHT: 264.8 LBS | HEART RATE: 94 BPM | HEIGHT: 71 IN | OXYGEN SATURATION: 96 % | SYSTOLIC BLOOD PRESSURE: 116 MMHG | DIASTOLIC BLOOD PRESSURE: 62 MMHG | BODY MASS INDEX: 37.07 KG/M2 | TEMPERATURE: 97.3 F

## 2025-06-23 DIAGNOSIS — E23.0 HYPOGONADOTROPIC HYPOGONADISM: Primary | ICD-10-CM

## 2025-06-23 DIAGNOSIS — F41.9 ANXIETY: ICD-10-CM

## 2025-06-23 PROCEDURE — 99214 OFFICE O/P EST MOD 30 MIN: CPT | Performed by: FAMILY MEDICINE

## 2025-06-23 PROCEDURE — G8427 DOCREV CUR MEDS BY ELIG CLIN: HCPCS | Performed by: FAMILY MEDICINE

## 2025-06-23 PROCEDURE — G8417 CALC BMI ABV UP PARAM F/U: HCPCS | Performed by: FAMILY MEDICINE

## 2025-06-23 PROCEDURE — 4004F PT TOBACCO SCREEN RCVD TLK: CPT | Performed by: FAMILY MEDICINE

## 2025-06-23 RX ORDER — DIAZEPAM 10 MG/1
TABLET ORAL
Qty: 90 TABLET | Refills: 2 | Status: SHIPPED | OUTPATIENT
Start: 2025-06-23 | End: 2025-09-22

## 2025-06-23 ASSESSMENT — PATIENT HEALTH QUESTIONNAIRE - PHQ9
4. FEELING TIRED OR HAVING LITTLE ENERGY: SEVERAL DAYS
3. TROUBLE FALLING OR STAYING ASLEEP: NOT AT ALL
8. MOVING OR SPEAKING SO SLOWLY THAT OTHER PEOPLE COULD HAVE NOTICED. OR THE OPPOSITE, BEING SO FIGETY OR RESTLESS THAT YOU HAVE BEEN MOVING AROUND A LOT MORE THAN USUAL: NOT AT ALL
SUM OF ALL RESPONSES TO PHQ QUESTIONS 1-9: 2
7. TROUBLE CONCENTRATING ON THINGS, SUCH AS READING THE NEWSPAPER OR WATCHING TELEVISION: NOT AT ALL
2. FEELING DOWN, DEPRESSED OR HOPELESS: NOT AT ALL
1. LITTLE INTEREST OR PLEASURE IN DOING THINGS: NOT AT ALL
6. FEELING BAD ABOUT YOURSELF - OR THAT YOU ARE A FAILURE OR HAVE LET YOURSELF OR YOUR FAMILY DOWN: NOT AT ALL
9. THOUGHTS THAT YOU WOULD BE BETTER OFF DEAD, OR OF HURTING YOURSELF: NOT AT ALL
SUM OF ALL RESPONSES TO PHQ QUESTIONS 1-9: 2
10. IF YOU CHECKED OFF ANY PROBLEMS, HOW DIFFICULT HAVE THESE PROBLEMS MADE IT FOR YOU TO DO YOUR WORK, TAKE CARE OF THINGS AT HOME, OR GET ALONG WITH OTHER PEOPLE: NOT DIFFICULT AT ALL
SUM OF ALL RESPONSES TO PHQ QUESTIONS 1-9: 2
SUM OF ALL RESPONSES TO PHQ QUESTIONS 1-9: 2
5. POOR APPETITE OR OVEREATING: SEVERAL DAYS

## 2025-06-23 NOTE — PROGRESS NOTES
Rodrigo Antonio (:  1980) is a 45 y.o. male,Established patient, here for evaluation of the following chief complaint(s):  3 Month Follow-Up (Chronic conditions)      Assessment & Plan   ASSESSMENT/PLAN:  Rodrigo Iqbal\" was seen today for 3 month follow-up.    Diagnoses and all orders for this visit:    Hypogonadotropic hypogonadism  Chronic. Stable on testosterone   Anxiety  -     diazePAM (VALIUM) 10 MG tablet; TAKE ONE TABLET BY MOUTH EVERY 6 HOURS AS NEEDED FOR ANXIETY  Chronic, stable on valium  Controlled Substances Monitoring: Periodic Controlled Substance Monitoring: Possible medication side effects, risk of tolerance/dependence & alternative treatments discussed., No signs of potential drug abuse or diversion identified. (Tanner Castillo MD)        No follow-ups on file.         Subjective   SUBJECTIVE/OBJECTIVE:  HPI  Pt is a of 45 y.o. male comes in today with   Chief Complaint   Patient presents with    3 Month Follow-Up     Chronic conditions     Feels like testosterone has been helping.    Review of Systems       Objective   Physical Exam         An electronic signature was used to authenticate this note.    --Tanner Castillo MD

## 2025-06-30 ENCOUNTER — TRANSCRIBE ORDERS (OUTPATIENT)
Dept: ADMINISTRATIVE | Age: 45
End: 2025-06-30

## 2025-06-30 DIAGNOSIS — N13.5 URETEROPELVIC JUNCTION OBSTRUCTION: Primary | ICD-10-CM

## 2025-06-30 DIAGNOSIS — E23.0 HYPOGONADOTROPIC HYPOGONADISM: ICD-10-CM

## 2025-06-30 DIAGNOSIS — E78.2 MIXED HYPERLIPIDEMIA: ICD-10-CM

## 2025-06-30 LAB
CHOLEST SERPL-MCNC: 209 MG/DL (ref 0–199)
DEPRECATED RDW RBC AUTO: 13.9 % (ref 12.4–15.4)
HCT VFR BLD AUTO: 47.9 % (ref 40.5–52.5)
HDLC SERPL-MCNC: 29 MG/DL (ref 40–60)
HGB BLD-MCNC: 15.9 G/DL (ref 13.5–17.5)
LDLC SERPL CALC-MCNC: 142 MG/DL
MCH RBC QN AUTO: 29 PG (ref 26–34)
MCHC RBC AUTO-ENTMCNC: 33.3 G/DL (ref 31–36)
MCV RBC AUTO: 87.2 FL (ref 80–100)
PLATELET # BLD AUTO: 175 K/UL (ref 135–450)
PMV BLD AUTO: 9.7 FL (ref 5–10.5)
RBC # BLD AUTO: 5.49 M/UL (ref 4.2–5.9)
TRIGL SERPL-MCNC: 189 MG/DL (ref 0–150)
VLDLC SERPL CALC-MCNC: 38 MG/DL
WBC # BLD AUTO: 7.8 K/UL (ref 4–11)

## 2025-07-01 ENCOUNTER — RESULTS FOLLOW-UP (OUTPATIENT)
Dept: FAMILY MEDICINE CLINIC | Age: 45
End: 2025-07-01

## 2025-07-03 LAB
SHBG SERPL-SCNC: 33 NMOL/L (ref 17–56)
TESTOST FREE SERPL-MCNC: 186.3 PG/ML (ref 47–244)
TESTOST SERPL-MCNC: 810 NG/DL (ref 249–836)

## 2025-07-16 DIAGNOSIS — E23.0 HYPOGONADOTROPIC HYPOGONADISM: ICD-10-CM

## 2025-07-16 RX ORDER — TESTOSTERONE CYPIONATE 200 MG/ML
INJECTION, SOLUTION INTRAMUSCULAR
Qty: 4 ML | Refills: 2 | Status: SHIPPED | OUTPATIENT
Start: 2025-07-16 | End: 2025-10-16

## 2025-07-16 NOTE — TELEPHONE ENCOUNTER
Medication:   Requested Prescriptions     Pending Prescriptions Disp Refills    testosterone cypionate (DEPOTESTOTERONE CYPIONATE) 200 MG/ML injection [Pharmacy Med Name: TESTOSTERONE  MG/ML] 4 mL      Sig: INJECT 0.8 ML INTRAMUSCULARLY ONCE WEEKLY-DISCARD REMAINING       Last Filled:  5/21/25    Patient Phone Number: [phone] (home)     Last appt: 6/23/2025   Next appt: 9/22/2025    Last Labs DM:   Lab Results   Component Value Date/Time    LABA1C 5.4 01/27/2025 08:53 AM     Last Lipid:   Lab Results   Component Value Date/Time    CHOL 209 06/30/2025 08:47 AM    TRIG 189 06/30/2025 08:47 AM    HDL 29 06/30/2025 08:47 AM     Last PSA:   Lab Results   Component Value Date/Time    PSA 0.90 04/15/2019 08:38 AM     Last Thyroid:   Lab Results   Component Value Date/Time    TSH 0.28 07/14/2023 07:49 AM    FT3 2.9 03/07/2014 06:34 PM    [id number] 0.84 11/19/2012 10:13 AM    T4FREE 1.3 08/30/2016 06:20 PM

## (undated) DEVICE — STAPLER INT DIA5MM 25 ABSRB STRP FIX DISP FOR HERN MESH

## (undated) DEVICE — LAPAROSCOPIC TROCAR SLEEVE/SINGLE USE: Brand: KII® LOW PROFILE OPTICAL ACCESS SYSTEM

## (undated) DEVICE — LAPAROSCOPY PACK: Brand: MEDLINE INDUSTRIES, INC.

## (undated) DEVICE — 3M™ IOBAN™ 2 ANTIMICROBIAL INCISE DRAPE 6648EZ: Brand: IOBAN™ 2

## (undated) DEVICE — CHLORAPREP 26ML ORANGE

## (undated) DEVICE — FORCEPS BX L240CM WRK CHN 2.8MM STD CAP W/ NDL MIC MESH

## (undated) DEVICE — GOWN SIRUS NONREIN LG W/TWL: Brand: MEDLINE INDUSTRIES, INC.

## (undated) DEVICE — GARMENT,MEDLINE,DVT,INT,CALF,MED, GEN2: Brand: MEDLINE

## (undated) DEVICE — SYSTEM DRUG DEL PERS THER MGR MYPTM

## (undated) DEVICE — 3M™ TEGADERM™ TRANSPARENT FILM DRESSING FRAME STYLE, 1626W, 4 IN X 4-3/4 IN (10 CM X 12 CM), 50/CT 4CT/CASE: Brand: 3M™ TEGADERM™

## (undated) DEVICE — SHEET, ORTHO, SPLIT, STERILE: Brand: MEDLINE

## (undated) DEVICE — SUTURE VCRL SZ 3-0 L18IN ABSRB UD L26MM SH 1/2 CIR J864D

## (undated) DEVICE — SPONGE GZ W4XL8IN COT WVN 12 PLY

## (undated) DEVICE — DRAPE,LAP,CHOLE,W/TROUGHS,STERILE: Brand: MEDLINE

## (undated) DEVICE — STANDARD HYPODERMIC NEEDLE,POLYPROPYLENE HUB: Brand: MONOJECT

## (undated) DEVICE — 3M™ DURAPORE™ SURGICAL TAPE 2 INCHES X 10YARDS (5.0CM X 9.1M) 6ROLLS/CARTON 10CARTONS/CASE 1538-2: Brand: 3M™ DURAPORE™

## (undated) DEVICE — COVER,MAYO STAND,XL,STERILE: Brand: MEDLINE

## (undated) DEVICE — GOWN,SIRUS,POLYRNF,SETINSLV,L,20/CS: Brand: MEDLINE

## (undated) DEVICE — BLADE ES ELASTOMERIC COAT INSUL DURABLE BEND UPTO 90DEG

## (undated) DEVICE — INTENDED FOR TISSUE SEPARATION, AND OTHER PROCEDURES THAT REQUIRE A SHARP SURGICAL BLADE TO PUNCTURE OR CUT.: Brand: BARD-PARKER ® CARBON RIB-BACK BLADES

## (undated) DEVICE — 1LYRTR 16FR10ML 100%SILI SNAP: Brand: MEDLINE INDUSTRIES, INC.

## (undated) DEVICE — SUTURE VCRL + SZ 0 L18IN ABSRB CLR VCP112G

## (undated) DEVICE — HYPODERMIC SAFETY NEEDLE: Brand: MAGELLAN

## (undated) DEVICE — Device

## (undated) DEVICE — SUTURE MCRYL SZ 4-0 L27IN ABSRB UD L19MM PS-2 1/2 CIR PRIM Y426H

## (undated) DEVICE — SOLUTION IV IRRIG WATER 500ML POUR BRL ST 2F7113

## (undated) DEVICE — CORD ES L10FT MPLR LAP

## (undated) DEVICE — PENCIL ES L3M ROCK SWCH S STL HEX LOK BLDE ELECTRD HOLSTER

## (undated) DEVICE — PACK,BASIC: Brand: MEDLINE

## (undated) DEVICE — SURGICAL SET UP - SURE SET: Brand: MEDLINE INDUSTRIES, INC.

## (undated) DEVICE — ENDOSCOPIC KIT 6X3/16 FT COLON W/ 1.1 OZ 2 GWN W/O BRSH

## (undated) DEVICE — COVER LT HNDL BLU PLAS

## (undated) DEVICE — TROCAR SLEEVE: Brand: KII ® LOW PROFILE SLEEVE

## (undated) DEVICE — 3M™ IOBAN™ 2 ANTIMICROBIAL INCISE DRAPE 6650EZ: Brand: IOBAN™ 2

## (undated) DEVICE — SUTURE MCRYL + SZ 4-0 L27IN ABSRB UD L19MM PS-2 3/8 CIR MCP426H

## (undated) DEVICE — BLADE CLIPPER GEN PURP NS

## (undated) DEVICE — BW-412T DISP COMBO CLEANING BRUSH: Brand: SINGLE USE COMBINATION CLEANING BRUSH

## (undated) DEVICE — GLOVE SURG SZ 6.5 L11.2IN FNGR THK9.8MIL STRW LTX POLYMER

## (undated) DEVICE — SUTURE PERMA-HAND SZ 2-0 L30IN NONABSORBABLE BLK L26MM SH K833H

## (undated) DEVICE — 3M™ WARMING BLANKET, UPPER BODY, 10 PER CASE, 42268: Brand: BAIR HUGGER™

## (undated) DEVICE — SYRINGE MED 10ML TRNSLUC BRL PLUNG BLK MRK POLYPR CTRL

## (undated) DEVICE — COVER LT HNDL CAM BLU DISP W/ SURG CTRL

## (undated) DEVICE — DISCONTINUED USE 295735 PROTECTOR ULNAR NERVE DISP

## (undated) DEVICE — TOWEL,OR,DSP,ST,BLUE,STD,4/PK,20PK/CS: Brand: MEDLINE

## (undated) DEVICE — SYRINGE MED 20ML STD CLR PLAS LUERLOCK TIP N CTRL DISP

## (undated) DEVICE — SKIN AFFIX SURG ADHESIVE 72/CS 0.55ML: Brand: MEDLINE

## (undated) DEVICE — ELECTRODE PT RET AD L9FT HI MOIST COND ADH HYDRGEL CORDED

## (undated) DEVICE — 30978 SEE SHARP - ENHANCED INTRAOPERATIVE LAPAROSCOPE CLEANING & DEFOGGING: Brand: 30978 SEE SHARP - ENHANCED INTRAOPERATIVE LAPAROSCOPE CLEANING & DEFOGGING

## (undated) DEVICE — GAUZE,SPONGE,4"X4",16PLY,XRAY,STRL,LF: Brand: MEDLINE

## (undated) DEVICE — SYRINGE IRRIG 60ML SFT PLIABLE BLB EZ TO GRP 1 HND USE W/

## (undated) DEVICE — GOWN,SIRUS,POLYRNF,SETINSLV,XL,20/CS: Brand: MEDLINE

## (undated) DEVICE — DRAPE 70X60IN SPLIT IMPERV ADHES STRIP

## (undated) DEVICE — TROCAR: Brand: KII FIOS FIRST ENTRY

## (undated) DEVICE — ANTENNA PT PRGMR EXT DETACH RECHRG DBS LEGEND

## (undated) DEVICE — MEDI-VAC NON-CONDUCTIVE SUCTION TUBING: Brand: CARDINAL HEALTH

## (undated) DEVICE — LIQUIBAND RAPID ADHESIVE 36/CS 0.8ML: Brand: MEDLINE

## (undated) DEVICE — SHEET,DRAPE,53X77,STERILE: Brand: MEDLINE

## (undated) DEVICE — DRAPE C ARM UNIV W41XL74IN CLR PLAS XR VELC CLSR POLY STRP

## (undated) DEVICE — MOUTHPIECE ENDOSCP L CTRL OPN AND SIDE PORTS DISP

## (undated) DEVICE — SURE SET-DOUBLE BASIN-LF: Brand: MEDLINE INDUSTRIES, INC.

## (undated) DEVICE — VALVE SUCTION AIR H2O SET ORCA POD + DISP

## (undated) DEVICE — AIR/WATER CLEANING ADAPTER FOR OLYMPUS® GI ENDOSCOPE: Brand: BULLDOG®

## (undated) DEVICE — TURNOVER KIT RM INF CTRL TECH

## (undated) DEVICE — APPLICATOR MEDICATED 26 CC SOLUTION HI LT ORNG CHLORAPREP

## (undated) DEVICE — PLUMEPORT ACTIV LAPAROSCOPIC SMOKE FILTRATION DEVICE: Brand: PLUMEPORT ACTIVE

## (undated) DEVICE — BLANKET WRM W29.9XL79.1IN UP BODY FORC AIR MISTRAL-AIR